# Patient Record
Sex: FEMALE | Race: WHITE | NOT HISPANIC OR LATINO | Employment: OTHER | URBAN - METROPOLITAN AREA
[De-identification: names, ages, dates, MRNs, and addresses within clinical notes are randomized per-mention and may not be internally consistent; named-entity substitution may affect disease eponyms.]

---

## 2017-02-04 ENCOUNTER — APPOINTMENT (INPATIENT)
Dept: CT IMAGING | Facility: HOSPITAL | Age: 82
DRG: 177 | End: 2017-02-04
Payer: MEDICARE

## 2017-02-04 ENCOUNTER — APPOINTMENT (EMERGENCY)
Dept: RADIOLOGY | Facility: HOSPITAL | Age: 82
DRG: 177 | End: 2017-02-04
Payer: MEDICARE

## 2017-02-04 ENCOUNTER — HOSPITAL ENCOUNTER (INPATIENT)
Facility: HOSPITAL | Age: 82
LOS: 9 days | Discharge: NURSING, REHAB OR LONG-TERM CARE | DRG: 177 | End: 2017-02-13
Attending: EMERGENCY MEDICINE | Admitting: INTERNAL MEDICINE
Payer: MEDICARE

## 2017-02-04 DIAGNOSIS — N17.9 AKI (ACUTE KIDNEY INJURY) (HCC): ICD-10-CM

## 2017-02-04 DIAGNOSIS — J44.1 COPD EXACERBATION (HCC): ICD-10-CM

## 2017-02-04 DIAGNOSIS — I50.9 ACUTE EXACERBATION OF CHF (CONGESTIVE HEART FAILURE) (HCC): Primary | ICD-10-CM

## 2017-02-04 PROBLEM — I44.7 LBBB (LEFT BUNDLE BRANCH BLOCK): Status: ACTIVE | Noted: 2017-02-04

## 2017-02-04 PROBLEM — J96.21 ACUTE ON CHRONIC RESPIRATORY FAILURE WITH HYPOXIA (HCC): Status: ACTIVE | Noted: 2017-02-04

## 2017-02-04 PROBLEM — E03.9 HYPOTHYROID: Status: ACTIVE | Noted: 2017-02-04

## 2017-02-04 LAB
ALBUMIN SERPL BCP-MCNC: 4 G/DL (ref 3.5–5)
ALP SERPL-CCNC: 52 U/L (ref 46–116)
ALT SERPL W P-5'-P-CCNC: 19 U/L (ref 12–78)
ANION GAP SERPL CALCULATED.3IONS-SCNC: 9 MMOL/L (ref 4–13)
APTT PPP: 31 SECONDS (ref 24–36)
AST SERPL W P-5'-P-CCNC: 17 U/L (ref 5–45)
BASE EXCESS BLDA CALC-SCNC: -2 MMOL/L (ref -2–3)
BASOPHILS # BLD AUTO: 0 THOUSANDS/ΜL (ref 0–0.1)
BASOPHILS NFR BLD AUTO: 0 % (ref 0–1)
BILIRUB SERPL-MCNC: 0.5 MG/DL (ref 0.2–1)
BILIRUB UR QL STRIP: NEGATIVE
BUN SERPL-MCNC: 59 MG/DL (ref 5–25)
CA-I BLD-SCNC: 1.15 MMOL/L (ref 1.12–1.32)
CALCIUM SERPL-MCNC: 9.2 MG/DL (ref 8.3–10.1)
CHLORIDE SERPL-SCNC: 101 MMOL/L (ref 100–108)
CLARITY UR: NORMAL
CO2 SERPL-SCNC: 29 MMOL/L (ref 21–32)
COLOR UR: YELLOW
CREAT SERPL-MCNC: 2.28 MG/DL (ref 0.6–1.3)
EOSINOPHIL # BLD AUTO: 0 THOUSAND/ΜL (ref 0–0.61)
EOSINOPHIL NFR BLD AUTO: 0 % (ref 0–6)
ERYTHROCYTE [DISTWIDTH] IN BLOOD BY AUTOMATED COUNT: 13.5 % (ref 11.6–15.1)
FLUAV AG SPEC QL IA: NEGATIVE
FLUAV AG SPEC QL: NORMAL
FLUBV AG SPEC QL IA: NEGATIVE
FLUBV AG SPEC QL: NORMAL
GFR SERPL CREATININE-BSD FRML MDRD: 20.4 ML/MIN/1.73SQ M
GLUCOSE SERPL-MCNC: 158 MG/DL (ref 65–140)
GLUCOSE SERPL-MCNC: 171 MG/DL (ref 65–140)
GLUCOSE SERPL-MCNC: 172 MG/DL (ref 65–140)
GLUCOSE SERPL-MCNC: 172 MG/DL (ref 65–140)
GLUCOSE UR STRIP-MCNC: NEGATIVE MG/DL
HCO3 BLDA-SCNC: 24.6 MMOL/L (ref 24–30)
HCT VFR BLD AUTO: 39.6 % (ref 34.8–46.1)
HCT VFR BLD CALC: 37 % (ref 34.8–46.1)
HGB BLD-MCNC: 12.3 G/DL (ref 11.5–15.4)
HGB BLDA-MCNC: 12.6 G/DL (ref 11.5–15.4)
HGB UR QL STRIP.AUTO: NEGATIVE
INR PPP: 1.12 (ref 0.86–1.16)
KETONES UR STRIP-MCNC: NEGATIVE MG/DL
LACTATE SERPL-SCNC: 3 MMOL/L (ref 0.5–2)
LEUKOCYTE ESTERASE UR QL STRIP: NEGATIVE
LYMPHOCYTES # BLD AUTO: 0.64 THOUSANDS/ΜL (ref 0.6–4.47)
LYMPHOCYTES NFR BLD AUTO: 9 % (ref 14–44)
MAGNESIUM SERPL-MCNC: 2 MG/DL (ref 1.6–2.6)
MCH RBC QN AUTO: 28.3 PG (ref 26.8–34.3)
MCHC RBC AUTO-ENTMCNC: 31.1 G/DL (ref 31.4–37.4)
MCV RBC AUTO: 91 FL (ref 82–98)
MONOCYTES # BLD AUTO: 0.19 THOUSAND/ΜL (ref 0.17–1.22)
MONOCYTES NFR BLD AUTO: 3 % (ref 4–12)
NEUTROPHILS # BLD AUTO: 6.06 THOUSANDS/ΜL (ref 1.85–7.62)
NEUTS SEG NFR BLD AUTO: 88 % (ref 43–75)
NITRITE UR QL STRIP: NEGATIVE
NT-PROBNP SERPL-MCNC: 316 PG/ML
PCO2 BLD: 26 MMOL/L (ref 21–32)
PCO2 BLD: 47.6 MM HG (ref 42–50)
PH BLD: 7.32 [PH] (ref 7.3–7.4)
PH UR STRIP.AUTO: 6 [PH] (ref 4.5–8)
PLATELET # BLD AUTO: 186 THOUSANDS/UL (ref 149–390)
PLATELET # BLD AUTO: 212 THOUSANDS/UL (ref 149–390)
PMV BLD AUTO: 10.3 FL (ref 8.9–12.7)
PMV BLD AUTO: 9.8 FL (ref 8.9–12.7)
PO2 BLD: 91 MM HG (ref 35–45)
POTASSIUM BLD-SCNC: 4.2 MMOL/L (ref 3.5–5.3)
POTASSIUM SERPL-SCNC: 4.4 MMOL/L (ref 3.5–5.3)
PROT SERPL-MCNC: 7.7 G/DL (ref 6.4–8.2)
PROT UR STRIP-MCNC: NEGATIVE MG/DL
PROTHROMBIN TIME: 14.2 SECONDS (ref 12–14.3)
RBC # BLD AUTO: 4.35 MILLION/UL (ref 3.81–5.12)
RSV B RNA SPEC QL NAA+PROBE: NORMAL
SAO2 % BLD FROM PO2: 96 % (ref 95–98)
SODIUM BLD-SCNC: 139 MMOL/L (ref 136–145)
SODIUM SERPL-SCNC: 139 MMOL/L (ref 136–145)
SP GR UR STRIP.AUTO: 1.02 (ref 1–1.03)
SPECIMEN SOURCE: ABNORMAL
TROPONIN I SERPL-MCNC: 0.07 NG/ML
TROPONIN I SERPL-MCNC: 0.08 NG/ML
TROPONIN I SERPL-MCNC: 0.12 NG/ML
TSH SERPL DL<=0.05 MIU/L-ACNC: 0.6 UIU/ML (ref 0.36–3.74)
UROBILINOGEN UR QL STRIP.AUTO: 0.2 E.U./DL
WBC # BLD AUTO: 6.89 THOUSAND/UL (ref 4.31–10.16)

## 2017-02-04 PROCEDURE — 84295 ASSAY OF SERUM SODIUM: CPT

## 2017-02-04 PROCEDURE — 94664 DEMO&/EVAL PT USE INHALER: CPT

## 2017-02-04 PROCEDURE — 36600 WITHDRAWAL OF ARTERIAL BLOOD: CPT

## 2017-02-04 PROCEDURE — C9113 INJ PANTOPRAZOLE SODIUM, VIA: HCPCS | Performed by: PHYSICIAN ASSISTANT

## 2017-02-04 PROCEDURE — 94640 AIRWAY INHALATION TREATMENT: CPT

## 2017-02-04 PROCEDURE — 84484 ASSAY OF TROPONIN QUANT: CPT | Performed by: PHYSICIAN ASSISTANT

## 2017-02-04 PROCEDURE — 85610 PROTHROMBIN TIME: CPT | Performed by: EMERGENCY MEDICINE

## 2017-02-04 PROCEDURE — 81003 URINALYSIS AUTO W/O SCOPE: CPT | Performed by: EMERGENCY MEDICINE

## 2017-02-04 PROCEDURE — 84484 ASSAY OF TROPONIN QUANT: CPT | Performed by: EMERGENCY MEDICINE

## 2017-02-04 PROCEDURE — 84443 ASSAY THYROID STIM HORMONE: CPT | Performed by: PHYSICIAN ASSISTANT

## 2017-02-04 PROCEDURE — 83605 ASSAY OF LACTIC ACID: CPT | Performed by: PHYSICIAN ASSISTANT

## 2017-02-04 PROCEDURE — 85730 THROMBOPLASTIN TIME PARTIAL: CPT | Performed by: EMERGENCY MEDICINE

## 2017-02-04 PROCEDURE — 71250 CT THORAX DX C-: CPT

## 2017-02-04 PROCEDURE — 94760 N-INVAS EAR/PLS OXIMETRY 1: CPT

## 2017-02-04 PROCEDURE — 94660 CPAP INITIATION&MGMT: CPT

## 2017-02-04 PROCEDURE — 85025 COMPLETE CBC W/AUTO DIFF WBC: CPT | Performed by: EMERGENCY MEDICINE

## 2017-02-04 PROCEDURE — 36415 COLL VENOUS BLD VENIPUNCTURE: CPT | Performed by: PHYSICIAN ASSISTANT

## 2017-02-04 PROCEDURE — 83735 ASSAY OF MAGNESIUM: CPT | Performed by: EMERGENCY MEDICINE

## 2017-02-04 PROCEDURE — 82803 BLOOD GASES ANY COMBINATION: CPT

## 2017-02-04 PROCEDURE — 80053 COMPREHEN METABOLIC PANEL: CPT | Performed by: EMERGENCY MEDICINE

## 2017-02-04 PROCEDURE — 82948 REAGENT STRIP/BLOOD GLUCOSE: CPT

## 2017-02-04 PROCEDURE — 99285 EMERGENCY DEPT VISIT HI MDM: CPT

## 2017-02-04 PROCEDURE — 85014 HEMATOCRIT: CPT

## 2017-02-04 PROCEDURE — 84132 ASSAY OF SERUM POTASSIUM: CPT

## 2017-02-04 PROCEDURE — 87040 BLOOD CULTURE FOR BACTERIA: CPT | Performed by: PHYSICIAN ASSISTANT

## 2017-02-04 PROCEDURE — 87449 NOS EACH ORGANISM AG IA: CPT | Performed by: INTERNAL MEDICINE

## 2017-02-04 PROCEDURE — 71010 HB CHEST X-RAY 1 VIEW FRONTAL (PORTABLE): CPT

## 2017-02-04 PROCEDURE — 96375 TX/PRO/DX INJ NEW DRUG ADDON: CPT

## 2017-02-04 PROCEDURE — 87400 INFLUENZA A/B EACH AG IA: CPT | Performed by: EMERGENCY MEDICINE

## 2017-02-04 PROCEDURE — 82947 ASSAY GLUCOSE BLOOD QUANT: CPT

## 2017-02-04 PROCEDURE — 82330 ASSAY OF CALCIUM: CPT

## 2017-02-04 PROCEDURE — 96374 THER/PROPH/DIAG INJ IV PUSH: CPT

## 2017-02-04 PROCEDURE — 83880 ASSAY OF NATRIURETIC PEPTIDE: CPT | Performed by: EMERGENCY MEDICINE

## 2017-02-04 PROCEDURE — 87798 DETECT AGENT NOS DNA AMP: CPT | Performed by: EMERGENCY MEDICINE

## 2017-02-04 PROCEDURE — 93005 ELECTROCARDIOGRAM TRACING: CPT | Performed by: EMERGENCY MEDICINE

## 2017-02-04 PROCEDURE — 85049 AUTOMATED PLATELET COUNT: CPT | Performed by: PHYSICIAN ASSISTANT

## 2017-02-04 RX ORDER — SODIUM CHLORIDE FOR INHALATION 0.9 %
VIAL, NEBULIZER (ML) INHALATION
Status: DISPENSED
Start: 2017-02-04 | End: 2017-02-05

## 2017-02-04 RX ORDER — NYSTATIN 100000 [USP'U]/G
1 POWDER TOPICAL 3 TIMES DAILY
Status: DISCONTINUED | OUTPATIENT
Start: 2017-02-04 | End: 2017-02-13 | Stop reason: HOSPADM

## 2017-02-04 RX ORDER — ONDANSETRON 2 MG/ML
4 INJECTION INTRAMUSCULAR; INTRAVENOUS ONCE
Status: COMPLETED | OUTPATIENT
Start: 2017-02-04 | End: 2017-02-04

## 2017-02-04 RX ORDER — LEVALBUTEROL 1.25 MG/.5ML
SOLUTION, CONCENTRATE RESPIRATORY (INHALATION)
Status: DISPENSED
Start: 2017-02-04 | End: 2017-02-05

## 2017-02-04 RX ORDER — QUETIAPINE FUMARATE 25 MG/1
50 TABLET, FILM COATED ORAL DAILY
Status: DISCONTINUED | OUTPATIENT
Start: 2017-02-04 | End: 2017-02-06

## 2017-02-04 RX ORDER — MINERAL OIL AND PETROLATUM 150; 830 MG/G; MG/G
1 OINTMENT OPHTHALMIC 4 TIMES DAILY
Status: DISCONTINUED | OUTPATIENT
Start: 2017-02-04 | End: 2017-02-07

## 2017-02-04 RX ORDER — ONDANSETRON 2 MG/ML
INJECTION INTRAMUSCULAR; INTRAVENOUS
Status: COMPLETED
Start: 2017-02-04 | End: 2017-02-04

## 2017-02-04 RX ORDER — PANTOPRAZOLE SODIUM 40 MG/1
40 INJECTION, POWDER, FOR SOLUTION INTRAVENOUS
Status: DISCONTINUED | OUTPATIENT
Start: 2017-02-04 | End: 2017-02-06

## 2017-02-04 RX ORDER — LEVOTHYROXINE SODIUM 0.05 MG/1
50 TABLET ORAL
Status: DISCONTINUED | OUTPATIENT
Start: 2017-02-05 | End: 2017-02-13 | Stop reason: HOSPADM

## 2017-02-04 RX ORDER — QUETIAPINE FUMARATE 25 MG/1
25 TABLET, FILM COATED ORAL DAILY
Status: DISCONTINUED | OUTPATIENT
Start: 2017-02-05 | End: 2017-02-13 | Stop reason: HOSPADM

## 2017-02-04 RX ORDER — SODIUM CHLORIDE FOR INHALATION 0.9 %
3 VIAL, NEBULIZER (ML) INHALATION
Status: DISCONTINUED | OUTPATIENT
Start: 2017-02-04 | End: 2017-02-06

## 2017-02-04 RX ORDER — BUMETANIDE 0.25 MG/ML
1 INJECTION, SOLUTION INTRAMUSCULAR; INTRAVENOUS ONCE
Status: COMPLETED | OUTPATIENT
Start: 2017-02-04 | End: 2017-02-04

## 2017-02-04 RX ORDER — HEPARIN SODIUM 5000 [USP'U]/ML
5000 INJECTION, SOLUTION INTRAVENOUS; SUBCUTANEOUS EVERY 8 HOURS SCHEDULED
Status: DISCONTINUED | OUTPATIENT
Start: 2017-02-04 | End: 2017-02-13 | Stop reason: HOSPADM

## 2017-02-04 RX ORDER — CITALOPRAM 20 MG/1
40 TABLET ORAL DAILY
Status: DISCONTINUED | OUTPATIENT
Start: 2017-02-04 | End: 2017-02-13 | Stop reason: HOSPADM

## 2017-02-04 RX ORDER — BUSPIRONE HYDROCHLORIDE 5 MG/1
15 TABLET ORAL 3 TIMES DAILY
Status: DISCONTINUED | OUTPATIENT
Start: 2017-02-04 | End: 2017-02-13 | Stop reason: HOSPADM

## 2017-02-04 RX ORDER — LORAZEPAM 0.5 MG/1
0.5 TABLET ORAL 3 TIMES DAILY
Status: DISCONTINUED | OUTPATIENT
Start: 2017-02-04 | End: 2017-02-13 | Stop reason: HOSPADM

## 2017-02-04 RX ORDER — ALBUTEROL SULFATE 2.5 MG/3ML
SOLUTION RESPIRATORY (INHALATION)
Status: COMPLETED
Start: 2017-02-04 | End: 2017-02-04

## 2017-02-04 RX ORDER — LEVALBUTEROL 1.25 MG/.5ML
1.25 SOLUTION, CONCENTRATE RESPIRATORY (INHALATION)
Status: DISCONTINUED | OUTPATIENT
Start: 2017-02-04 | End: 2017-02-06

## 2017-02-04 RX ORDER — METHYLPREDNISOLONE SODIUM SUCCINATE 40 MG/ML
40 INJECTION, POWDER, LYOPHILIZED, FOR SOLUTION INTRAMUSCULAR; INTRAVENOUS EVERY 8 HOURS SCHEDULED
Status: DISCONTINUED | OUTPATIENT
Start: 2017-02-04 | End: 2017-02-05

## 2017-02-04 RX ORDER — MELATONIN
2000 DAILY
Status: DISCONTINUED | OUTPATIENT
Start: 2017-02-04 | End: 2017-02-13 | Stop reason: HOSPADM

## 2017-02-04 RX ORDER — QUETIAPINE FUMARATE 100 MG/1
100 TABLET, FILM COATED ORAL
Status: DISCONTINUED | OUTPATIENT
Start: 2017-02-04 | End: 2017-02-13 | Stop reason: HOSPADM

## 2017-02-04 RX ORDER — METHYLPREDNISOLONE SODIUM SUCCINATE 125 MG/2ML
INJECTION, POWDER, LYOPHILIZED, FOR SOLUTION INTRAMUSCULAR; INTRAVENOUS
Status: COMPLETED
Start: 2017-02-04 | End: 2017-02-04

## 2017-02-04 RX ADMIN — ONDANSETRON 4 MG: 2 INJECTION INTRAMUSCULAR; INTRAVENOUS at 05:45

## 2017-02-04 RX ADMIN — MINERAL OIL AND WHITE PETROLATUM 1 APPLICATION: 150; 830 OINTMENT OPHTHALMIC at 18:14

## 2017-02-04 RX ADMIN — MINERAL OIL AND WHITE PETROLATUM 1 APPLICATION: 150; 830 OINTMENT OPHTHALMIC at 22:30

## 2017-02-04 RX ADMIN — PANTOPRAZOLE SODIUM 40 MG: 40 INJECTION, POWDER, FOR SOLUTION INTRAVENOUS at 08:21

## 2017-02-04 RX ADMIN — LORAZEPAM 0.5 MG: 0.5 TABLET ORAL at 17:43

## 2017-02-04 RX ADMIN — ISODIUM CHLORIDE 3 ML: 0.03 SOLUTION RESPIRATORY (INHALATION) at 14:30

## 2017-02-04 RX ADMIN — CEFTRIAXONE SODIUM 1000 MG: 10 INJECTION, POWDER, FOR SOLUTION INTRAVENOUS at 12:25

## 2017-02-04 RX ADMIN — METHYLPREDNISOLONE SODIUM SUCCINATE: 125 INJECTION, POWDER, FOR SOLUTION INTRAMUSCULAR; INTRAVENOUS at 06:13

## 2017-02-04 RX ADMIN — LEVALBUTEROL HYDROCHLORIDE 1.25 MG: 1.25 SOLUTION, CONCENTRATE RESPIRATORY (INHALATION) at 14:30

## 2017-02-04 RX ADMIN — BUMETANIDE 1 MG: 0.25 INJECTION INTRAMUSCULAR; INTRAVENOUS at 06:19

## 2017-02-04 RX ADMIN — ALBUTEROL SULFATE: 2.5 SOLUTION RESPIRATORY (INHALATION) at 06:14

## 2017-02-04 RX ADMIN — METRONIDAZOLE 500 MG: 500 INJECTION, SOLUTION INTRAVENOUS at 19:02

## 2017-02-04 RX ADMIN — AZITHROMYCIN MONOHYDRATE 500 MG: 500 INJECTION, POWDER, LYOPHILIZED, FOR SOLUTION INTRAVENOUS at 10:47

## 2017-02-04 RX ADMIN — HEPARIN SODIUM 5000 UNITS: 5000 INJECTION, SOLUTION INTRAVENOUS; SUBCUTANEOUS at 08:21

## 2017-02-04 RX ADMIN — BUSPIRONE HYDROCHLORIDE 15 MG: 5 TABLET ORAL at 17:43

## 2017-02-04 RX ADMIN — LEVALBUTEROL HYDROCHLORIDE 1.25 MG: 1.25 SOLUTION, CONCENTRATE RESPIRATORY (INHALATION) at 19:17

## 2017-02-04 RX ADMIN — METHYLPREDNISOLONE SODIUM SUCCINATE 40 MG: 40 INJECTION, POWDER, FOR SOLUTION INTRAMUSCULAR; INTRAVENOUS at 20:08

## 2017-02-04 RX ADMIN — INSULIN LISPRO 1 UNITS: 100 INJECTION, SOLUTION INTRAVENOUS; SUBCUTANEOUS at 19:04

## 2017-02-04 RX ADMIN — HEPARIN SODIUM 5000 UNITS: 5000 INJECTION, SOLUTION INTRAVENOUS; SUBCUTANEOUS at 21:10

## 2017-02-04 RX ADMIN — NYSTATIN 1 APPLICATION: 100000 POWDER TOPICAL at 17:00

## 2017-02-04 RX ADMIN — INSULIN LISPRO 1 UNITS: 100 INJECTION, SOLUTION INTRAVENOUS; SUBCUTANEOUS at 12:51

## 2017-02-04 RX ADMIN — ISODIUM CHLORIDE 3 ML: 0.03 SOLUTION RESPIRATORY (INHALATION) at 19:17

## 2017-02-04 RX ADMIN — NYSTATIN 1 APPLICATION: 100000 POWDER TOPICAL at 21:00

## 2017-02-04 RX ADMIN — METHYLPREDNISOLONE SODIUM SUCCINATE 40 MG: 40 INJECTION, POWDER, FOR SOLUTION INTRAMUSCULAR; INTRAVENOUS at 10:49

## 2017-02-04 RX ADMIN — METHYLPREDNISOLONE SODIUM SUCCINATE: 40 INJECTION, POWDER, FOR SOLUTION INTRAMUSCULAR; INTRAVENOUS at 06:13

## 2017-02-05 ENCOUNTER — APPOINTMENT (INPATIENT)
Dept: NON INVASIVE DIAGNOSTICS | Facility: HOSPITAL | Age: 82
DRG: 177 | End: 2017-02-05
Payer: MEDICARE

## 2017-02-05 PROBLEM — I21.4 NSTEMI (NON-ST ELEVATED MYOCARDIAL INFARCTION) (HCC): Status: ACTIVE | Noted: 2017-02-05

## 2017-02-05 PROBLEM — F32.A DEPRESSION: Status: ACTIVE | Noted: 2017-02-05

## 2017-02-05 PROBLEM — R73.9 HYPERGLYCEMIA: Status: ACTIVE | Noted: 2017-02-05

## 2017-02-05 PROBLEM — J18.9 CAP (COMMUNITY ACQUIRED PNEUMONIA): Status: ACTIVE | Noted: 2017-02-05

## 2017-02-05 PROBLEM — D72.829 LEUKOCYTOSIS: Status: ACTIVE | Noted: 2017-02-05

## 2017-02-05 PROBLEM — D64.9 ANEMIA: Status: ACTIVE | Noted: 2017-02-05

## 2017-02-05 LAB
ANION GAP SERPL CALCULATED.3IONS-SCNC: 9 MMOL/L (ref 4–13)
BASOPHILS # BLD MANUAL: 0 THOUSAND/UL (ref 0–0.1)
BASOPHILS NFR MAR MANUAL: 0 % (ref 0–1)
BUN SERPL-MCNC: 64 MG/DL (ref 5–25)
CALCIUM SERPL-MCNC: 8.5 MG/DL (ref 8.3–10.1)
CHLORIDE SERPL-SCNC: 102 MMOL/L (ref 100–108)
CO2 SERPL-SCNC: 28 MMOL/L (ref 21–32)
CREAT SERPL-MCNC: 2.26 MG/DL (ref 0.6–1.3)
EOSINOPHIL # BLD MANUAL: 0 THOUSAND/UL (ref 0–0.4)
EOSINOPHIL NFR BLD MANUAL: 0 % (ref 0–6)
ERYTHROCYTE [DISTWIDTH] IN BLOOD BY AUTOMATED COUNT: 13.7 % (ref 11.6–15.1)
GFR SERPL CREATININE-BSD FRML MDRD: 20.6 ML/MIN/1.73SQ M
GLUCOSE SERPL-MCNC: 135 MG/DL (ref 65–140)
GLUCOSE SERPL-MCNC: 149 MG/DL (ref 65–140)
GLUCOSE SERPL-MCNC: 151 MG/DL (ref 65–140)
GLUCOSE SERPL-MCNC: 168 MG/DL (ref 65–140)
GLUCOSE SERPL-MCNC: 188 MG/DL (ref 65–140)
HCT VFR BLD AUTO: 31.7 % (ref 34.8–46.1)
HGB BLD-MCNC: 9.8 G/DL (ref 11.5–15.4)
L PNEUMO1 AG UR QL IA.RAPID: NEGATIVE
LACTATE SERPL-SCNC: 1.2 MMOL/L (ref 0.5–2)
LYMPHOCYTES # BLD AUTO: 0.45 THOUSAND/UL (ref 0.6–4.47)
LYMPHOCYTES # BLD AUTO: 3 % (ref 14–44)
MAGNESIUM SERPL-MCNC: 2.1 MG/DL (ref 1.6–2.6)
MCH RBC QN AUTO: 28.2 PG (ref 26.8–34.3)
MCHC RBC AUTO-ENTMCNC: 30.9 G/DL (ref 31.4–37.4)
MCV RBC AUTO: 91 FL (ref 82–98)
MONOCYTES # BLD AUTO: 0.76 THOUSAND/UL (ref 0–1.22)
MONOCYTES NFR BLD: 5 % (ref 4–12)
NEUTROPHILS # BLD MANUAL: 13.93 THOUSAND/UL (ref 1.85–7.62)
NEUTS SEG NFR BLD AUTO: 92 % (ref 43–75)
PLATELET # BLD AUTO: 175 THOUSANDS/UL (ref 149–390)
PLATELET BLD QL SMEAR: ADEQUATE
PMV BLD AUTO: 10.9 FL (ref 8.9–12.7)
POTASSIUM SERPL-SCNC: 3.7 MMOL/L (ref 3.5–5.3)
RBC # BLD AUTO: 3.48 MILLION/UL (ref 3.81–5.12)
S PNEUM AG UR QL: NEGATIVE
SODIUM SERPL-SCNC: 139 MMOL/L (ref 136–145)
TOTAL CELLS COUNTED SPEC: 100
WBC # BLD AUTO: 15.14 THOUSAND/UL (ref 4.31–10.16)

## 2017-02-05 PROCEDURE — 94640 AIRWAY INHALATION TREATMENT: CPT

## 2017-02-05 PROCEDURE — 85027 COMPLETE CBC AUTOMATED: CPT | Performed by: PHYSICIAN ASSISTANT

## 2017-02-05 PROCEDURE — 85007 BL SMEAR W/DIFF WBC COUNT: CPT | Performed by: PHYSICIAN ASSISTANT

## 2017-02-05 PROCEDURE — 94660 CPAP INITIATION&MGMT: CPT

## 2017-02-05 PROCEDURE — 82948 REAGENT STRIP/BLOOD GLUCOSE: CPT

## 2017-02-05 PROCEDURE — 80048 BASIC METABOLIC PNL TOTAL CA: CPT | Performed by: PHYSICIAN ASSISTANT

## 2017-02-05 PROCEDURE — 94760 N-INVAS EAR/PLS OXIMETRY 1: CPT

## 2017-02-05 PROCEDURE — C9113 INJ PANTOPRAZOLE SODIUM, VIA: HCPCS | Performed by: PHYSICIAN ASSISTANT

## 2017-02-05 PROCEDURE — 83605 ASSAY OF LACTIC ACID: CPT | Performed by: PHYSICIAN ASSISTANT

## 2017-02-05 PROCEDURE — 83735 ASSAY OF MAGNESIUM: CPT | Performed by: PHYSICIAN ASSISTANT

## 2017-02-05 PROCEDURE — 93306 TTE W/DOPPLER COMPLETE: CPT

## 2017-02-05 RX ORDER — METHYLPREDNISOLONE SODIUM SUCCINATE 40 MG/ML
40 INJECTION, POWDER, LYOPHILIZED, FOR SOLUTION INTRAMUSCULAR; INTRAVENOUS EVERY 12 HOURS SCHEDULED
Status: COMPLETED | OUTPATIENT
Start: 2017-02-05 | End: 2017-02-07

## 2017-02-05 RX ORDER — SODIUM CHLORIDE 9 MG/ML
75 INJECTION, SOLUTION INTRAVENOUS CONTINUOUS
Status: DISCONTINUED | OUTPATIENT
Start: 2017-02-05 | End: 2017-02-06

## 2017-02-05 RX ORDER — ACETAMINOPHEN 325 MG/1
650 TABLET ORAL EVERY 6 HOURS PRN
Status: DISCONTINUED | OUTPATIENT
Start: 2017-02-05 | End: 2017-02-13 | Stop reason: HOSPADM

## 2017-02-05 RX ADMIN — BUSPIRONE HYDROCHLORIDE 15 MG: 5 TABLET ORAL at 21:58

## 2017-02-05 RX ADMIN — INSULIN LISPRO 1 UNITS: 100 INJECTION, SOLUTION INTRAVENOUS; SUBCUTANEOUS at 19:21

## 2017-02-05 RX ADMIN — LORAZEPAM 0.5 MG: 0.5 TABLET ORAL at 09:30

## 2017-02-05 RX ADMIN — QUETIAPINE FUMARATE 50 MG: 25 TABLET, FILM COATED ORAL at 22:00

## 2017-02-05 RX ADMIN — QUETIAPINE FUMARATE 100 MG: 100 TABLET ORAL at 22:00

## 2017-02-05 RX ADMIN — QUETIAPINE FUMARATE 25 MG: 25 TABLET, FILM COATED ORAL at 09:31

## 2017-02-05 RX ADMIN — NYSTATIN 1 APPLICATION: 100000 POWDER TOPICAL at 17:22

## 2017-02-05 RX ADMIN — METRONIDAZOLE 500 MG: 500 INJECTION, SOLUTION INTRAVENOUS at 19:21

## 2017-02-05 RX ADMIN — LEVALBUTEROL HYDROCHLORIDE 1.25 MG: 1.25 SOLUTION, CONCENTRATE RESPIRATORY (INHALATION) at 08:13

## 2017-02-05 RX ADMIN — BUSPIRONE HYDROCHLORIDE 15 MG: 5 TABLET ORAL at 17:18

## 2017-02-05 RX ADMIN — ISODIUM CHLORIDE 3 ML: 0.03 SOLUTION RESPIRATORY (INHALATION) at 19:17

## 2017-02-05 RX ADMIN — METRONIDAZOLE 500 MG: 500 INJECTION, SOLUTION INTRAVENOUS at 11:47

## 2017-02-05 RX ADMIN — NYSTATIN 1 APPLICATION: 100000 POWDER TOPICAL at 09:32

## 2017-02-05 RX ADMIN — NYSTATIN 1 APPLICATION: 100000 POWDER TOPICAL at 22:00

## 2017-02-05 RX ADMIN — MINERAL OIL AND WHITE PETROLATUM 1 APPLICATION: 150; 830 OINTMENT OPHTHALMIC at 17:21

## 2017-02-05 RX ADMIN — AZITHROMYCIN MONOHYDRATE 500 MG: 500 INJECTION, POWDER, LYOPHILIZED, FOR SOLUTION INTRAVENOUS at 09:36

## 2017-02-05 RX ADMIN — LEVOTHYROXINE SODIUM 50 MCG: 50 TABLET ORAL at 05:57

## 2017-02-05 RX ADMIN — METHYLPREDNISOLONE SODIUM SUCCINATE 40 MG: 40 INJECTION, POWDER, FOR SOLUTION INTRAMUSCULAR; INTRAVENOUS at 21:58

## 2017-02-05 RX ADMIN — HEPARIN SODIUM 5000 UNITS: 5000 INJECTION, SOLUTION INTRAVENOUS; SUBCUTANEOUS at 21:58

## 2017-02-05 RX ADMIN — INSULIN LISPRO 1 UNITS: 100 INJECTION, SOLUTION INTRAVENOUS; SUBCUTANEOUS at 00:26

## 2017-02-05 RX ADMIN — LORAZEPAM 0.5 MG: 0.5 TABLET ORAL at 17:18

## 2017-02-05 RX ADMIN — SODIUM CHLORIDE 75 ML/HR: 0.9 INJECTION, SOLUTION INTRAVENOUS at 11:46

## 2017-02-05 RX ADMIN — LORAZEPAM 0.5 MG: 0.5 TABLET ORAL at 21:58

## 2017-02-05 RX ADMIN — MINERAL OIL AND WHITE PETROLATUM 1 APPLICATION: 150; 830 OINTMENT OPHTHALMIC at 21:58

## 2017-02-05 RX ADMIN — BUSPIRONE HYDROCHLORIDE 15 MG: 5 TABLET ORAL at 09:32

## 2017-02-05 RX ADMIN — ISODIUM CHLORIDE 3 ML: 0.03 SOLUTION RESPIRATORY (INHALATION) at 13:20

## 2017-02-05 RX ADMIN — VITAMIN D, TAB 1000IU (100/BT) 2000 UNITS: 25 TAB at 09:31

## 2017-02-05 RX ADMIN — METRONIDAZOLE 500 MG: 500 INJECTION, SOLUTION INTRAVENOUS at 03:38

## 2017-02-05 RX ADMIN — HEPARIN SODIUM 5000 UNITS: 5000 INJECTION, SOLUTION INTRAVENOUS; SUBCUTANEOUS at 05:06

## 2017-02-05 RX ADMIN — MINERAL OIL AND WHITE PETROLATUM 1 APPLICATION: 150; 830 OINTMENT OPHTHALMIC at 12:32

## 2017-02-05 RX ADMIN — MINERAL OIL AND WHITE PETROLATUM 1 APPLICATION: 150; 830 OINTMENT OPHTHALMIC at 09:33

## 2017-02-05 RX ADMIN — LEVALBUTEROL HYDROCHLORIDE 1.25 MG: 1.25 SOLUTION, CONCENTRATE RESPIRATORY (INHALATION) at 13:20

## 2017-02-05 RX ADMIN — ISODIUM CHLORIDE 3 ML: 0.03 SOLUTION RESPIRATORY (INHALATION) at 08:12

## 2017-02-05 RX ADMIN — CITALOPRAM HYDROBROMIDE 40 MG: 20 TABLET ORAL at 09:30

## 2017-02-05 RX ADMIN — LEVALBUTEROL HYDROCHLORIDE 1.25 MG: 1.25 SOLUTION, CONCENTRATE RESPIRATORY (INHALATION) at 19:17

## 2017-02-05 RX ADMIN — CEFTRIAXONE SODIUM 1000 MG: 10 INJECTION, POWDER, FOR SOLUTION INTRAVENOUS at 09:35

## 2017-02-05 RX ADMIN — HEPARIN SODIUM 5000 UNITS: 5000 INJECTION, SOLUTION INTRAVENOUS; SUBCUTANEOUS at 14:03

## 2017-02-05 RX ADMIN — ACETAMINOPHEN 650 MG: 325 TABLET ORAL at 23:54

## 2017-02-05 RX ADMIN — PANTOPRAZOLE SODIUM 40 MG: 40 INJECTION, POWDER, FOR SOLUTION INTRAVENOUS at 09:30

## 2017-02-06 LAB
ANION GAP SERPL CALCULATED.3IONS-SCNC: 9 MMOL/L (ref 4–13)
BASOPHILS # BLD MANUAL: 0 THOUSAND/UL (ref 0–0.1)
BASOPHILS NFR MAR MANUAL: 0 % (ref 0–1)
BUN SERPL-MCNC: 63 MG/DL (ref 5–25)
CALCIUM SERPL-MCNC: 8.5 MG/DL (ref 8.3–10.1)
CHLORIDE SERPL-SCNC: 105 MMOL/L (ref 100–108)
CO2 SERPL-SCNC: 29 MMOL/L (ref 21–32)
CREAT SERPL-MCNC: 1.89 MG/DL (ref 0.6–1.3)
EOSINOPHIL # BLD MANUAL: 0 THOUSAND/UL (ref 0–0.4)
EOSINOPHIL NFR BLD MANUAL: 0 % (ref 0–6)
ERYTHROCYTE [DISTWIDTH] IN BLOOD BY AUTOMATED COUNT: 13.8 % (ref 11.6–15.1)
GFR SERPL CREATININE-BSD FRML MDRD: 25.3 ML/MIN/1.73SQ M
GLUCOSE SERPL-MCNC: 108 MG/DL (ref 65–140)
GLUCOSE SERPL-MCNC: 108 MG/DL (ref 65–140)
GLUCOSE SERPL-MCNC: 133 MG/DL (ref 65–140)
GLUCOSE SERPL-MCNC: 143 MG/DL (ref 65–140)
GLUCOSE SERPL-MCNC: 143 MG/DL (ref 65–140)
GLUCOSE SERPL-MCNC: 158 MG/DL (ref 65–140)
HCT VFR BLD AUTO: 31.4 % (ref 34.8–46.1)
HEMOCCULT STL QL: NEGATIVE
HGB BLD-MCNC: 9.6 G/DL (ref 11.5–15.4)
LYMPHOCYTES # BLD AUTO: 0.48 THOUSAND/UL (ref 0.6–4.47)
LYMPHOCYTES # BLD AUTO: 4 % (ref 14–44)
MCH RBC QN AUTO: 27.8 PG (ref 26.8–34.3)
MCHC RBC AUTO-ENTMCNC: 30.6 G/DL (ref 31.4–37.4)
MCV RBC AUTO: 91 FL (ref 82–98)
MONOCYTES # BLD AUTO: 0.6 THOUSAND/UL (ref 0–1.22)
MONOCYTES NFR BLD: 5 % (ref 4–12)
NEUTROPHILS # BLD MANUAL: 10.94 THOUSAND/UL (ref 1.85–7.62)
NEUTS SEG NFR BLD AUTO: 91 % (ref 43–75)
PLATELET # BLD AUTO: 190 THOUSANDS/UL (ref 149–390)
PLATELET BLD QL SMEAR: ADEQUATE
PMV BLD AUTO: 10.7 FL (ref 8.9–12.7)
POTASSIUM SERPL-SCNC: 3.8 MMOL/L (ref 3.5–5.3)
RBC # BLD AUTO: 3.45 MILLION/UL (ref 3.81–5.12)
SODIUM SERPL-SCNC: 143 MMOL/L (ref 136–145)
TOTAL CELLS COUNTED SPEC: 100
WBC # BLD AUTO: 12.02 THOUSAND/UL (ref 4.31–10.16)

## 2017-02-06 PROCEDURE — 87070 CULTURE OTHR SPECIMN AEROBIC: CPT | Performed by: PHYSICIAN ASSISTANT

## 2017-02-06 PROCEDURE — 80048 BASIC METABOLIC PNL TOTAL CA: CPT | Performed by: PHYSICIAN ASSISTANT

## 2017-02-06 PROCEDURE — 94760 N-INVAS EAR/PLS OXIMETRY 1: CPT

## 2017-02-06 PROCEDURE — 85007 BL SMEAR W/DIFF WBC COUNT: CPT | Performed by: PHYSICIAN ASSISTANT

## 2017-02-06 PROCEDURE — C9113 INJ PANTOPRAZOLE SODIUM, VIA: HCPCS | Performed by: PHYSICIAN ASSISTANT

## 2017-02-06 PROCEDURE — 82272 OCCULT BLD FECES 1-3 TESTS: CPT | Performed by: PHYSICIAN ASSISTANT

## 2017-02-06 PROCEDURE — 94640 AIRWAY INHALATION TREATMENT: CPT

## 2017-02-06 PROCEDURE — 87205 SMEAR GRAM STAIN: CPT | Performed by: PHYSICIAN ASSISTANT

## 2017-02-06 PROCEDURE — 85027 COMPLETE CBC AUTOMATED: CPT | Performed by: PHYSICIAN ASSISTANT

## 2017-02-06 PROCEDURE — 82948 REAGENT STRIP/BLOOD GLUCOSE: CPT

## 2017-02-06 RX ORDER — QUETIAPINE FUMARATE 25 MG/1
50 TABLET, FILM COATED ORAL DAILY
Status: DISCONTINUED | OUTPATIENT
Start: 2017-02-06 | End: 2017-02-13 | Stop reason: HOSPADM

## 2017-02-06 RX ORDER — SODIUM CHLORIDE FOR INHALATION 0.9 %
3 VIAL, NEBULIZER (ML) INHALATION
Status: DISCONTINUED | OUTPATIENT
Start: 2017-02-06 | End: 2017-02-06

## 2017-02-06 RX ORDER — QUETIAPINE FUMARATE 25 MG/1
50 TABLET, FILM COATED ORAL DAILY
Status: DISCONTINUED | OUTPATIENT
Start: 2017-02-07 | End: 2017-02-06

## 2017-02-06 RX ORDER — PANTOPRAZOLE SODIUM 40 MG/1
40 TABLET, DELAYED RELEASE ORAL
Status: DISCONTINUED | OUTPATIENT
Start: 2017-02-06 | End: 2017-02-13 | Stop reason: HOSPADM

## 2017-02-06 RX ORDER — LEVALBUTEROL 1.25 MG/.5ML
1.25 SOLUTION, CONCENTRATE RESPIRATORY (INHALATION) EVERY 8 HOURS PRN
Status: DISCONTINUED | OUTPATIENT
Start: 2017-02-06 | End: 2017-02-07

## 2017-02-06 RX ORDER — AZITHROMYCIN 250 MG/1
500 TABLET, FILM COATED ORAL EVERY 24 HOURS
Status: DISCONTINUED | OUTPATIENT
Start: 2017-02-06 | End: 2017-02-10

## 2017-02-06 RX ORDER — SODIUM CHLORIDE FOR INHALATION 0.9 %
3 VIAL, NEBULIZER (ML) INHALATION
Status: DISCONTINUED | OUTPATIENT
Start: 2017-02-06 | End: 2017-02-09

## 2017-02-06 RX ORDER — LEVALBUTEROL 1.25 MG/.5ML
1.25 SOLUTION, CONCENTRATE RESPIRATORY (INHALATION)
Status: DISCONTINUED | OUTPATIENT
Start: 2017-02-06 | End: 2017-02-06

## 2017-02-06 RX ORDER — LEVALBUTEROL 1.25 MG/.5ML
1.25 SOLUTION, CONCENTRATE RESPIRATORY (INHALATION)
Status: DISCONTINUED | OUTPATIENT
Start: 2017-02-06 | End: 2017-02-09

## 2017-02-06 RX ORDER — GUAIFENESIN 600 MG
600 TABLET, EXTENDED RELEASE 12 HR ORAL 2 TIMES DAILY
Status: DISCONTINUED | OUTPATIENT
Start: 2017-02-06 | End: 2017-02-13 | Stop reason: HOSPADM

## 2017-02-06 RX ADMIN — METHYLPREDNISOLONE SODIUM SUCCINATE 40 MG: 40 INJECTION, POWDER, FOR SOLUTION INTRAMUSCULAR; INTRAVENOUS at 09:17

## 2017-02-06 RX ADMIN — LEVALBUTEROL HYDROCHLORIDE 1.25 MG: 1.25 SOLUTION, CONCENTRATE RESPIRATORY (INHALATION) at 13:55

## 2017-02-06 RX ADMIN — BUSPIRONE HYDROCHLORIDE 15 MG: 5 TABLET ORAL at 15:47

## 2017-02-06 RX ADMIN — MINERAL OIL AND WHITE PETROLATUM 1 APPLICATION: 150; 830 OINTMENT OPHTHALMIC at 09:08

## 2017-02-06 RX ADMIN — LEVALBUTEROL HYDROCHLORIDE 1.25 MG: 1.25 SOLUTION, CONCENTRATE RESPIRATORY (INHALATION) at 07:46

## 2017-02-06 RX ADMIN — HEPARIN SODIUM 5000 UNITS: 5000 INJECTION, SOLUTION INTRAVENOUS; SUBCUTANEOUS at 21:37

## 2017-02-06 RX ADMIN — ISODIUM CHLORIDE 3 ML: 0.03 SOLUTION RESPIRATORY (INHALATION) at 19:53

## 2017-02-06 RX ADMIN — LORAZEPAM 0.5 MG: 0.5 TABLET ORAL at 09:03

## 2017-02-06 RX ADMIN — PANTOPRAZOLE SODIUM 40 MG: 40 INJECTION, POWDER, FOR SOLUTION INTRAVENOUS at 09:03

## 2017-02-06 RX ADMIN — NYSTATIN 1 APPLICATION: 100000 POWDER TOPICAL at 09:20

## 2017-02-06 RX ADMIN — ISODIUM CHLORIDE 3 ML: 0.03 SOLUTION RESPIRATORY (INHALATION) at 07:46

## 2017-02-06 RX ADMIN — LEVALBUTEROL HYDROCHLORIDE 1.25 MG: 1.25 SOLUTION, CONCENTRATE RESPIRATORY (INHALATION) at 19:53

## 2017-02-06 RX ADMIN — NYSTATIN 1 APPLICATION: 100000 POWDER TOPICAL at 15:48

## 2017-02-06 RX ADMIN — METHYLPREDNISOLONE SODIUM SUCCINATE 40 MG: 40 INJECTION, POWDER, FOR SOLUTION INTRAMUSCULAR; INTRAVENOUS at 21:37

## 2017-02-06 RX ADMIN — CEFTRIAXONE SODIUM 1000 MG: 10 INJECTION, POWDER, FOR SOLUTION INTRAVENOUS at 09:17

## 2017-02-06 RX ADMIN — LORAZEPAM 0.5 MG: 0.5 TABLET ORAL at 21:37

## 2017-02-06 RX ADMIN — ISODIUM CHLORIDE 3 ML: 0.03 SOLUTION RESPIRATORY (INHALATION) at 13:55

## 2017-02-06 RX ADMIN — HEPARIN SODIUM 5000 UNITS: 5000 INJECTION, SOLUTION INTRAVENOUS; SUBCUTANEOUS at 05:28

## 2017-02-06 RX ADMIN — QUETIAPINE FUMARATE 25 MG: 25 TABLET, FILM COATED ORAL at 09:03

## 2017-02-06 RX ADMIN — BUSPIRONE HYDROCHLORIDE 15 MG: 5 TABLET ORAL at 09:03

## 2017-02-06 RX ADMIN — QUETIAPINE FUMARATE 50 MG: 25 TABLET, FILM COATED ORAL at 21:41

## 2017-02-06 RX ADMIN — Medication 5 DROP: at 12:16

## 2017-02-06 RX ADMIN — SODIUM CHLORIDE 75 ML/HR: 0.9 INJECTION, SOLUTION INTRAVENOUS at 01:45

## 2017-02-06 RX ADMIN — VITAMIN D, TAB 1000IU (100/BT) 2000 UNITS: 25 TAB at 09:03

## 2017-02-06 RX ADMIN — BUSPIRONE HYDROCHLORIDE 15 MG: 5 TABLET ORAL at 21:37

## 2017-02-06 RX ADMIN — METRONIDAZOLE 500 MG: 500 INJECTION, SOLUTION INTRAVENOUS at 02:32

## 2017-02-06 RX ADMIN — METRONIDAZOLE 500 MG: 500 INJECTION, SOLUTION INTRAVENOUS at 12:12

## 2017-02-06 RX ADMIN — GUAIFENESIN 600 MG: 600 TABLET, EXTENDED RELEASE ORAL at 12:12

## 2017-02-06 RX ADMIN — LEVOTHYROXINE SODIUM 50 MCG: 50 TABLET ORAL at 05:28

## 2017-02-06 RX ADMIN — AZITHROMYCIN 500 MG: 250 TABLET, FILM COATED ORAL at 12:11

## 2017-02-06 RX ADMIN — CITALOPRAM HYDROBROMIDE 40 MG: 20 TABLET ORAL at 09:03

## 2017-02-06 RX ADMIN — NYSTATIN 1 APPLICATION: 100000 POWDER TOPICAL at 21:40

## 2017-02-06 RX ADMIN — MINERAL OIL AND WHITE PETROLATUM 1 APPLICATION: 150; 830 OINTMENT OPHTHALMIC at 18:52

## 2017-02-06 RX ADMIN — QUETIAPINE FUMARATE 100 MG: 100 TABLET ORAL at 21:40

## 2017-02-06 RX ADMIN — LORAZEPAM 0.5 MG: 0.5 TABLET ORAL at 15:47

## 2017-02-06 RX ADMIN — GUAIFENESIN 600 MG: 600 TABLET, EXTENDED RELEASE ORAL at 18:51

## 2017-02-06 RX ADMIN — HEPARIN SODIUM 5000 UNITS: 5000 INJECTION, SOLUTION INTRAVENOUS; SUBCUTANEOUS at 14:26

## 2017-02-06 RX ADMIN — METRONIDAZOLE 500 MG: 500 INJECTION, SOLUTION INTRAVENOUS at 19:42

## 2017-02-07 LAB
ANION GAP SERPL CALCULATED.3IONS-SCNC: 8 MMOL/L (ref 4–13)
ATRIAL RATE: 85 BPM
BASOPHILS # BLD MANUAL: 0 THOUSAND/UL (ref 0–0.1)
BASOPHILS NFR MAR MANUAL: 0 % (ref 0–1)
BUN SERPL-MCNC: 59 MG/DL (ref 5–25)
C DIFF TOX GENS STL QL NAA+PROBE: NORMAL
CALCIUM SERPL-MCNC: 8.8 MG/DL (ref 8.3–10.1)
CHLORIDE SERPL-SCNC: 106 MMOL/L (ref 100–108)
CO2 SERPL-SCNC: 28 MMOL/L (ref 21–32)
CREAT SERPL-MCNC: 1.69 MG/DL (ref 0.6–1.3)
EOSINOPHIL # BLD MANUAL: 0 THOUSAND/UL (ref 0–0.4)
EOSINOPHIL NFR BLD MANUAL: 0 % (ref 0–6)
ERYTHROCYTE [DISTWIDTH] IN BLOOD BY AUTOMATED COUNT: 13.9 % (ref 11.6–15.1)
GFR SERPL CREATININE-BSD FRML MDRD: 28.8 ML/MIN/1.73SQ M
GLUCOSE SERPL-MCNC: 107 MG/DL (ref 65–140)
GLUCOSE SERPL-MCNC: 126 MG/DL (ref 65–140)
GLUCOSE SERPL-MCNC: 152 MG/DL (ref 65–140)
GLUCOSE SERPL-MCNC: 152 MG/DL (ref 65–140)
GLUCOSE SERPL-MCNC: 156 MG/DL (ref 65–140)
GLUCOSE SERPL-MCNC: 198 MG/DL (ref 65–140)
GLUCOSE SERPL-MCNC: 210 MG/DL (ref 65–140)
HCT VFR BLD AUTO: 32.5 % (ref 34.8–46.1)
HGB BLD-MCNC: 9.9 G/DL (ref 11.5–15.4)
LYMPHOCYTES # BLD AUTO: 0.5 THOUSAND/UL (ref 0.6–4.47)
LYMPHOCYTES # BLD AUTO: 5 % (ref 14–44)
MCH RBC QN AUTO: 27.9 PG (ref 26.8–34.3)
MCHC RBC AUTO-ENTMCNC: 30.5 G/DL (ref 31.4–37.4)
MCV RBC AUTO: 92 FL (ref 82–98)
MONOCYTES # BLD AUTO: 0.7 THOUSAND/UL (ref 0–1.22)
MONOCYTES NFR BLD: 7 % (ref 4–12)
NEUTROPHILS # BLD MANUAL: 8.75 THOUSAND/UL (ref 1.85–7.62)
NEUTS SEG NFR BLD AUTO: 88 % (ref 43–75)
OVALOCYTES BLD QL SMEAR: PRESENT
P AXIS: 71 DEGREES
PLATELET # BLD AUTO: 211 THOUSANDS/UL (ref 149–390)
PLATELET BLD QL SMEAR: ADEQUATE
PMV BLD AUTO: 10.2 FL (ref 8.9–12.7)
POTASSIUM SERPL-SCNC: 3.9 MMOL/L (ref 3.5–5.3)
PR INTERVAL: 230 MS
QRS AXIS: -57 DEGREES
QRSD INTERVAL: 126 MS
QT INTERVAL: 384 MS
QTC INTERVAL: 453 MS
RBC # BLD AUTO: 3.55 MILLION/UL (ref 3.81–5.12)
RBC MORPH BLD: PRESENT
SODIUM SERPL-SCNC: 142 MMOL/L (ref 136–145)
T WAVE AXIS: 92 DEGREES
TOTAL CELLS COUNTED SPEC: 100
VENTRICULAR RATE: 84 BPM
WBC # BLD AUTO: 9.94 THOUSAND/UL (ref 4.31–10.16)

## 2017-02-07 PROCEDURE — 80048 BASIC METABOLIC PNL TOTAL CA: CPT | Performed by: INTERNAL MEDICINE

## 2017-02-07 PROCEDURE — 97163 PT EVAL HIGH COMPLEX 45 MIN: CPT

## 2017-02-07 PROCEDURE — 87493 C DIFF AMPLIFIED PROBE: CPT | Performed by: INTERNAL MEDICINE

## 2017-02-07 PROCEDURE — 85007 BL SMEAR W/DIFF WBC COUNT: CPT | Performed by: INTERNAL MEDICINE

## 2017-02-07 PROCEDURE — G8978 MOBILITY CURRENT STATUS: HCPCS

## 2017-02-07 PROCEDURE — 85027 COMPLETE CBC AUTOMATED: CPT | Performed by: INTERNAL MEDICINE

## 2017-02-07 PROCEDURE — 94668 MNPJ CHEST WALL SBSQ: CPT

## 2017-02-07 PROCEDURE — 82948 REAGENT STRIP/BLOOD GLUCOSE: CPT

## 2017-02-07 PROCEDURE — 94640 AIRWAY INHALATION TREATMENT: CPT

## 2017-02-07 PROCEDURE — 97110 THERAPEUTIC EXERCISES: CPT

## 2017-02-07 PROCEDURE — G8979 MOBILITY GOAL STATUS: HCPCS

## 2017-02-07 PROCEDURE — 94760 N-INVAS EAR/PLS OXIMETRY 1: CPT

## 2017-02-07 RX ORDER — PREDNISONE 20 MG/1
40 TABLET ORAL DAILY
Status: DISCONTINUED | OUTPATIENT
Start: 2017-02-08 | End: 2017-02-09

## 2017-02-07 RX ORDER — ALBUTEROL SULFATE 2.5 MG/3ML
2.5 SOLUTION RESPIRATORY (INHALATION) EVERY 6 HOURS PRN
Status: DISCONTINUED | OUTPATIENT
Start: 2017-02-07 | End: 2017-02-13 | Stop reason: HOSPADM

## 2017-02-07 RX ORDER — ALBUTEROL SULFATE 2.5 MG/3ML
SOLUTION RESPIRATORY (INHALATION)
Status: COMPLETED
Start: 2017-02-07 | End: 2017-02-07

## 2017-02-07 RX ORDER — POLYVINYL ALCOHOL 14 MG/ML
1 SOLUTION/ DROPS OPHTHALMIC 4 TIMES DAILY
Status: DISCONTINUED | OUTPATIENT
Start: 2017-02-07 | End: 2017-02-13 | Stop reason: HOSPADM

## 2017-02-07 RX ORDER — METRONIDAZOLE 500 MG/1
500 TABLET ORAL EVERY 8 HOURS SCHEDULED
Status: COMPLETED | OUTPATIENT
Start: 2017-02-07 | End: 2017-02-10

## 2017-02-07 RX ADMIN — HEPARIN SODIUM 5000 UNITS: 5000 INJECTION, SOLUTION INTRAVENOUS; SUBCUTANEOUS at 14:33

## 2017-02-07 RX ADMIN — LEVALBUTEROL HYDROCHLORIDE 1.25 MG: 1.25 SOLUTION, CONCENTRATE RESPIRATORY (INHALATION) at 14:02

## 2017-02-07 RX ADMIN — ISODIUM CHLORIDE 3 ML: 0.03 SOLUTION RESPIRATORY (INHALATION) at 14:02

## 2017-02-07 RX ADMIN — BUSPIRONE HYDROCHLORIDE 15 MG: 5 TABLET ORAL at 15:19

## 2017-02-07 RX ADMIN — INSULIN LISPRO 2 UNITS: 100 INJECTION, SOLUTION INTRAVENOUS; SUBCUTANEOUS at 17:46

## 2017-02-07 RX ADMIN — QUETIAPINE FUMARATE 25 MG: 25 TABLET, FILM COATED ORAL at 08:41

## 2017-02-07 RX ADMIN — ACETAMINOPHEN 650 MG: 325 TABLET ORAL at 08:52

## 2017-02-07 RX ADMIN — POLYVINYL ALCOHOL 1 DROP: 14 SOLUTION/ DROPS OPHTHALMIC at 14:33

## 2017-02-07 RX ADMIN — METRONIDAZOLE 500 MG: 500 INJECTION, SOLUTION INTRAVENOUS at 03:31

## 2017-02-07 RX ADMIN — LORAZEPAM 0.5 MG: 0.5 TABLET ORAL at 08:40

## 2017-02-07 RX ADMIN — LORAZEPAM 0.5 MG: 0.5 TABLET ORAL at 20:12

## 2017-02-07 RX ADMIN — INSULIN LISPRO 1 UNITS: 100 INJECTION, SOLUTION INTRAVENOUS; SUBCUTANEOUS at 08:03

## 2017-02-07 RX ADMIN — GUAIFENESIN 600 MG: 600 TABLET, EXTENDED RELEASE ORAL at 17:00

## 2017-02-07 RX ADMIN — HEPARIN SODIUM 5000 UNITS: 5000 INJECTION, SOLUTION INTRAVENOUS; SUBCUTANEOUS at 22:30

## 2017-02-07 RX ADMIN — BUSPIRONE HYDROCHLORIDE 15 MG: 5 TABLET ORAL at 08:16

## 2017-02-07 RX ADMIN — METRONIDAZOLE 500 MG: 500 INJECTION, SOLUTION INTRAVENOUS at 10:54

## 2017-02-07 RX ADMIN — VITAMIN D, TAB 1000IU (100/BT) 2000 UNITS: 25 TAB at 08:16

## 2017-02-07 RX ADMIN — METHYLPREDNISOLONE SODIUM SUCCINATE 40 MG: 40 INJECTION, POWDER, FOR SOLUTION INTRAMUSCULAR; INTRAVENOUS at 20:12

## 2017-02-07 RX ADMIN — PANTOPRAZOLE SODIUM 40 MG: 40 TABLET, DELAYED RELEASE ORAL at 06:03

## 2017-02-07 RX ADMIN — CEFTRIAXONE SODIUM 1000 MG: 10 INJECTION, POWDER, FOR SOLUTION INTRAVENOUS at 08:53

## 2017-02-07 RX ADMIN — ALBUTEROL SULFATE 2.5 MG: 2.5 SOLUTION RESPIRATORY (INHALATION) at 03:50

## 2017-02-07 RX ADMIN — ISODIUM CHLORIDE 3 ML: 0.03 SOLUTION RESPIRATORY (INHALATION) at 07:30

## 2017-02-07 RX ADMIN — INSULIN LISPRO 1 UNITS: 100 INJECTION, SOLUTION INTRAVENOUS; SUBCUTANEOUS at 12:43

## 2017-02-07 RX ADMIN — METRONIDAZOLE 500 MG: 500 TABLET ORAL at 20:12

## 2017-02-07 RX ADMIN — LEVALBUTEROL HYDROCHLORIDE 1.25 MG: 1.25 SOLUTION, CONCENTRATE RESPIRATORY (INHALATION) at 07:30

## 2017-02-07 RX ADMIN — GUAIFENESIN 600 MG: 600 TABLET, EXTENDED RELEASE ORAL at 08:41

## 2017-02-07 RX ADMIN — CITALOPRAM HYDROBROMIDE 40 MG: 20 TABLET ORAL at 08:16

## 2017-02-07 RX ADMIN — POLYVINYL ALCOHOL 1 DROP: 14 SOLUTION/ DROPS OPHTHALMIC at 22:31

## 2017-02-07 RX ADMIN — LORAZEPAM 0.5 MG: 0.5 TABLET ORAL at 15:19

## 2017-02-07 RX ADMIN — QUETIAPINE FUMARATE 100 MG: 100 TABLET ORAL at 22:30

## 2017-02-07 RX ADMIN — AZITHROMYCIN 500 MG: 250 TABLET, FILM COATED ORAL at 10:56

## 2017-02-07 RX ADMIN — LEVOTHYROXINE SODIUM 50 MCG: 50 TABLET ORAL at 05:36

## 2017-02-07 RX ADMIN — ISODIUM CHLORIDE 3 ML: 0.03 SOLUTION RESPIRATORY (INHALATION) at 19:40

## 2017-02-07 RX ADMIN — BUSPIRONE HYDROCHLORIDE 15 MG: 5 TABLET ORAL at 20:12

## 2017-02-07 RX ADMIN — HEPARIN SODIUM 5000 UNITS: 5000 INJECTION, SOLUTION INTRAVENOUS; SUBCUTANEOUS at 05:36

## 2017-02-07 RX ADMIN — METHYLPREDNISOLONE SODIUM SUCCINATE 40 MG: 40 INJECTION, POWDER, FOR SOLUTION INTRAMUSCULAR; INTRAVENOUS at 08:40

## 2017-02-07 RX ADMIN — MINERAL OIL AND WHITE PETROLATUM 1 APPLICATION: 150; 830 OINTMENT OPHTHALMIC at 08:11

## 2017-02-07 RX ADMIN — QUETIAPINE FUMARATE 50 MG: 25 TABLET, FILM COATED ORAL at 22:31

## 2017-02-07 RX ADMIN — LEVALBUTEROL HYDROCHLORIDE 1.25 MG: 1.25 SOLUTION, CONCENTRATE RESPIRATORY (INHALATION) at 19:40

## 2017-02-07 RX ADMIN — POLYVINYL ALCOHOL 1 DROP: 14 SOLUTION/ DROPS OPHTHALMIC at 17:00

## 2017-02-08 PROBLEM — N17.9 AKI (ACUTE KIDNEY INJURY) (HCC): Status: RESOLVED | Noted: 2017-02-04 | Resolved: 2017-02-08

## 2017-02-08 PROBLEM — I21.4 NSTEMI (NON-ST ELEVATED MYOCARDIAL INFARCTION) (HCC): Status: RESOLVED | Noted: 2017-02-05 | Resolved: 2017-02-08

## 2017-02-08 PROBLEM — J96.21 ACUTE ON CHRONIC RESPIRATORY FAILURE WITH HYPOXIA (HCC): Status: RESOLVED | Noted: 2017-02-04 | Resolved: 2017-02-08

## 2017-02-08 LAB
BACTERIA SPT RESP CULT: NORMAL
GLUCOSE SERPL-MCNC: 111 MG/DL (ref 65–140)
GLUCOSE SERPL-MCNC: 121 MG/DL (ref 65–140)
GLUCOSE SERPL-MCNC: 159 MG/DL (ref 65–140)
GLUCOSE SERPL-MCNC: 81 MG/DL (ref 65–140)
GLUCOSE SERPL-MCNC: 92 MG/DL (ref 65–140)
GRAM STN SPEC: NORMAL
GRAM STN SPEC: NORMAL

## 2017-02-08 PROCEDURE — 94640 AIRWAY INHALATION TREATMENT: CPT

## 2017-02-08 PROCEDURE — 82948 REAGENT STRIP/BLOOD GLUCOSE: CPT

## 2017-02-08 PROCEDURE — 97110 THERAPEUTIC EXERCISES: CPT

## 2017-02-08 PROCEDURE — 97116 GAIT TRAINING THERAPY: CPT

## 2017-02-08 PROCEDURE — 94760 N-INVAS EAR/PLS OXIMETRY 1: CPT

## 2017-02-08 PROCEDURE — 94668 MNPJ CHEST WALL SBSQ: CPT

## 2017-02-08 RX ORDER — CEFDINIR 300 MG/1
300 CAPSULE ORAL 2 TIMES DAILY
Qty: 10 CAPSULE | Refills: 0 | Status: SHIPPED | OUTPATIENT
Start: 2017-02-08 | End: 2017-02-13 | Stop reason: HOSPADM

## 2017-02-08 RX ORDER — LABETALOL HYDROCHLORIDE 5 MG/ML
10 INJECTION, SOLUTION INTRAVENOUS ONCE
Status: COMPLETED | OUTPATIENT
Start: 2017-02-08 | End: 2017-02-08

## 2017-02-08 RX ORDER — GUAIFENESIN 600 MG
600 TABLET, EXTENDED RELEASE 12 HR ORAL 2 TIMES DAILY
Qty: 20 TABLET | Refills: 0 | Status: SHIPPED | OUTPATIENT
Start: 2017-02-08 | End: 2017-02-18

## 2017-02-08 RX ORDER — CEFDINIR 300 MG/1
300 CAPSULE ORAL EVERY 12 HOURS SCHEDULED
Status: COMPLETED | OUTPATIENT
Start: 2017-02-08 | End: 2017-02-10

## 2017-02-08 RX ORDER — AZITHROMYCIN 500 MG/1
500 TABLET, FILM COATED ORAL EVERY 24 HOURS
Qty: 3 TABLET | Refills: 0 | Status: SHIPPED | OUTPATIENT
Start: 2017-02-08 | End: 2017-02-11

## 2017-02-08 RX ORDER — METHYLPREDNISOLONE SODIUM SUCCINATE 40 MG/ML
40 INJECTION, POWDER, LYOPHILIZED, FOR SOLUTION INTRAMUSCULAR; INTRAVENOUS EVERY 12 HOURS SCHEDULED
Status: DISCONTINUED | OUTPATIENT
Start: 2017-02-08 | End: 2017-02-08

## 2017-02-08 RX ORDER — PREDNISONE 20 MG/1
40 TABLET ORAL DAILY
Qty: 30 TABLET | Refills: 0 | Status: SHIPPED | OUTPATIENT
Start: 2017-02-08 | End: 2017-02-11

## 2017-02-08 RX ORDER — PREDNISONE 20 MG/1
40 TABLET ORAL DAILY
Status: DISCONTINUED | OUTPATIENT
Start: 2017-02-08 | End: 2017-02-08

## 2017-02-08 RX ORDER — SODIUM CHLORIDE FOR INHALATION 0.9 %
3 VIAL, NEBULIZER (ML) INHALATION
Qty: 270 ML | Refills: 0 | Status: SHIPPED | OUTPATIENT
Start: 2017-02-08 | End: 2017-03-10

## 2017-02-08 RX ORDER — METRONIDAZOLE 500 MG/1
500 TABLET ORAL EVERY 8 HOURS SCHEDULED
Qty: 9 TABLET | Refills: 0 | Status: SHIPPED | OUTPATIENT
Start: 2017-02-08 | End: 2017-02-11

## 2017-02-08 RX ORDER — LEVALBUTEROL 1.25 MG/.5ML
1.25 SOLUTION, CONCENTRATE RESPIRATORY (INHALATION)
Qty: 45 ML | Refills: 0 | Status: SHIPPED | OUTPATIENT
Start: 2017-02-08 | End: 2017-03-02 | Stop reason: ALTCHOICE

## 2017-02-08 RX ADMIN — NYSTATIN 1 APPLICATION: 100000 POWDER TOPICAL at 08:34

## 2017-02-08 RX ADMIN — LORAZEPAM 0.5 MG: 0.5 TABLET ORAL at 13:47

## 2017-02-08 RX ADMIN — VITAMIN D, TAB 1000IU (100/BT) 2000 UNITS: 25 TAB at 08:30

## 2017-02-08 RX ADMIN — BUSPIRONE HYDROCHLORIDE 15 MG: 5 TABLET ORAL at 15:40

## 2017-02-08 RX ADMIN — ISODIUM CHLORIDE 3 ML: 0.03 SOLUTION RESPIRATORY (INHALATION) at 20:42

## 2017-02-08 RX ADMIN — LEVALBUTEROL HYDROCHLORIDE 1.25 MG: 1.25 SOLUTION, CONCENTRATE RESPIRATORY (INHALATION) at 20:42

## 2017-02-08 RX ADMIN — QUETIAPINE FUMARATE 25 MG: 25 TABLET, FILM COATED ORAL at 08:31

## 2017-02-08 RX ADMIN — METRONIDAZOLE 500 MG: 500 TABLET ORAL at 20:47

## 2017-02-08 RX ADMIN — POLYVINYL ALCOHOL 1 DROP: 14 SOLUTION/ DROPS OPHTHALMIC at 18:38

## 2017-02-08 RX ADMIN — QUETIAPINE FUMARATE 100 MG: 100 TABLET ORAL at 20:46

## 2017-02-08 RX ADMIN — ACETAMINOPHEN 650 MG: 325 TABLET ORAL at 16:28

## 2017-02-08 RX ADMIN — GUAIFENESIN 600 MG: 600 TABLET, EXTENDED RELEASE ORAL at 08:31

## 2017-02-08 RX ADMIN — LEVOTHYROXINE SODIUM 50 MCG: 50 TABLET ORAL at 05:00

## 2017-02-08 RX ADMIN — METRONIDAZOLE 500 MG: 500 TABLET ORAL at 15:00

## 2017-02-08 RX ADMIN — HEPARIN SODIUM 5000 UNITS: 5000 INJECTION, SOLUTION INTRAVENOUS; SUBCUTANEOUS at 05:00

## 2017-02-08 RX ADMIN — NYSTATIN 1 APPLICATION: 100000 POWDER TOPICAL at 20:48

## 2017-02-08 RX ADMIN — PANTOPRAZOLE SODIUM 40 MG: 40 TABLET, DELAYED RELEASE ORAL at 05:00

## 2017-02-08 RX ADMIN — HEPARIN SODIUM 5000 UNITS: 5000 INJECTION, SOLUTION INTRAVENOUS; SUBCUTANEOUS at 22:04

## 2017-02-08 RX ADMIN — HYDROCHLOROTHIAZIDE: 25 TABLET ORAL at 23:24

## 2017-02-08 RX ADMIN — AZITHROMYCIN 500 MG: 250 TABLET, FILM COATED ORAL at 10:53

## 2017-02-08 RX ADMIN — ALBUTEROL SULFATE 2.5 MG: 2.5 SOLUTION RESPIRATORY (INHALATION) at 00:19

## 2017-02-08 RX ADMIN — ISODIUM CHLORIDE 3 ML: 0.03 SOLUTION RESPIRATORY (INHALATION) at 13:41

## 2017-02-08 RX ADMIN — PREDNISONE 40 MG: 20 TABLET ORAL at 08:31

## 2017-02-08 RX ADMIN — CEFDINIR 300 MG: 300 CAPSULE ORAL at 11:06

## 2017-02-08 RX ADMIN — Medication 5 DROP: at 08:35

## 2017-02-08 RX ADMIN — ISODIUM CHLORIDE 3 ML: 0.03 SOLUTION RESPIRATORY (INHALATION) at 07:16

## 2017-02-08 RX ADMIN — LABETALOL HYDROCHLORIDE 10 MG: 5 INJECTION, SOLUTION INTRAVENOUS at 10:53

## 2017-02-08 RX ADMIN — POLYVINYL ALCOHOL 1 DROP: 14 SOLUTION/ DROPS OPHTHALMIC at 14:00

## 2017-02-08 RX ADMIN — CEFDINIR 300 MG: 300 CAPSULE ORAL at 20:47

## 2017-02-08 RX ADMIN — METOPROLOL SUCCINATE 75 MG: 50 TABLET, FILM COATED, EXTENDED RELEASE ORAL at 23:30

## 2017-02-08 RX ADMIN — QUETIAPINE FUMARATE 50 MG: 25 TABLET, FILM COATED ORAL at 20:46

## 2017-02-08 RX ADMIN — LEVALBUTEROL HYDROCHLORIDE 1.25 MG: 1.25 SOLUTION, CONCENTRATE RESPIRATORY (INHALATION) at 07:15

## 2017-02-08 RX ADMIN — BUSPIRONE HYDROCHLORIDE 15 MG: 5 TABLET ORAL at 08:30

## 2017-02-08 RX ADMIN — METRONIDAZOLE 500 MG: 500 TABLET ORAL at 05:00

## 2017-02-08 RX ADMIN — NYSTATIN 1 APPLICATION: 100000 POWDER TOPICAL at 16:00

## 2017-02-08 RX ADMIN — LORAZEPAM 0.5 MG: 0.5 TABLET ORAL at 08:31

## 2017-02-08 RX ADMIN — BUSPIRONE HYDROCHLORIDE 15 MG: 5 TABLET ORAL at 20:45

## 2017-02-08 RX ADMIN — POLYVINYL ALCOHOL 1 DROP: 14 SOLUTION/ DROPS OPHTHALMIC at 08:36

## 2017-02-08 RX ADMIN — LORAZEPAM 0.5 MG: 0.5 TABLET ORAL at 20:47

## 2017-02-08 RX ADMIN — GUAIFENESIN 600 MG: 600 TABLET, EXTENDED RELEASE ORAL at 18:37

## 2017-02-08 RX ADMIN — POLYVINYL ALCOHOL 1 DROP: 14 SOLUTION/ DROPS OPHTHALMIC at 22:04

## 2017-02-08 RX ADMIN — LEVALBUTEROL HYDROCHLORIDE 1.25 MG: 1.25 SOLUTION, CONCENTRATE RESPIRATORY (INHALATION) at 13:41

## 2017-02-08 RX ADMIN — CITALOPRAM HYDROBROMIDE 40 MG: 20 TABLET ORAL at 08:30

## 2017-02-09 ENCOUNTER — APPOINTMENT (INPATIENT)
Dept: RADIOLOGY | Facility: HOSPITAL | Age: 82
DRG: 177 | End: 2017-02-09
Payer: MEDICARE

## 2017-02-09 LAB
ARTERIAL PATENCY WRIST A: ABNORMAL
BACTERIA BLD CULT: NORMAL
BACTERIA BLD CULT: NORMAL
BASE EXCESS BLDA CALC-SCNC: 2 MMOL/L (ref -2–3)
CA-I BLD-SCNC: 1.25 MMOL/L (ref 1.12–1.32)
DS:DELIVERY SYSTEM: ABNORMAL
FIO2 GAS DIL.REBREATH: 40 L
GLUCOSE SERPL-MCNC: 100 MG/DL (ref 65–140)
GLUCOSE SERPL-MCNC: 117 MG/DL (ref 65–140)
GLUCOSE SERPL-MCNC: 70 MG/DL (ref 65–140)
GLUCOSE SERPL-MCNC: 82 MG/DL (ref 65–140)
GLUCOSE SERPL-MCNC: 99 MG/DL (ref 65–140)
HCO3 BLDA-SCNC: 28.1 MMOL/L (ref 22–28)
HCT VFR BLD CALC: 31 % (ref 34.8–46.1)
HGB BLDA-MCNC: 10.5 G/DL (ref 11.5–15.4)
PCO2 BLD: 30 MMOL/L (ref 21–32)
PCO2 BLD: 48.1 MM HG (ref 36–44)
PH BLD: 7.38 [PH] (ref 7.35–7.45)
PO2 BLD: 171 MM HG (ref 75–129)
POTASSIUM BLD-SCNC: 4.6 MMOL/L (ref 3.5–5.3)
SAMPLE SITE: ABNORMAL
SAO2 % BLD FROM PO2: 99 % (ref 95–98)
SODIUM BLD-SCNC: 140 MMOL/L (ref 136–145)
SPECIMEN SOURCE: ABNORMAL

## 2017-02-09 PROCEDURE — 85014 HEMATOCRIT: CPT

## 2017-02-09 PROCEDURE — 82330 ASSAY OF CALCIUM: CPT

## 2017-02-09 PROCEDURE — 97110 THERAPEUTIC EXERCISES: CPT

## 2017-02-09 PROCEDURE — 82803 BLOOD GASES ANY COMBINATION: CPT

## 2017-02-09 PROCEDURE — 84132 ASSAY OF SERUM POTASSIUM: CPT

## 2017-02-09 PROCEDURE — 94640 AIRWAY INHALATION TREATMENT: CPT

## 2017-02-09 PROCEDURE — 82947 ASSAY GLUCOSE BLOOD QUANT: CPT

## 2017-02-09 PROCEDURE — 94760 N-INVAS EAR/PLS OXIMETRY 1: CPT

## 2017-02-09 PROCEDURE — 71010 HB CHEST X-RAY 1 VIEW FRONTAL (PORTABLE): CPT

## 2017-02-09 PROCEDURE — 97116 GAIT TRAINING THERAPY: CPT

## 2017-02-09 PROCEDURE — 94668 MNPJ CHEST WALL SBSQ: CPT

## 2017-02-09 PROCEDURE — 36600 WITHDRAWAL OF ARTERIAL BLOOD: CPT

## 2017-02-09 PROCEDURE — 84295 ASSAY OF SERUM SODIUM: CPT

## 2017-02-09 PROCEDURE — 82948 REAGENT STRIP/BLOOD GLUCOSE: CPT

## 2017-02-09 RX ORDER — LEVALBUTEROL 1.25 MG/.5ML
1.25 SOLUTION, CONCENTRATE RESPIRATORY (INHALATION)
Status: DISCONTINUED | OUTPATIENT
Start: 2017-02-09 | End: 2017-02-13 | Stop reason: HOSPADM

## 2017-02-09 RX ORDER — LOPERAMIDE HYDROCHLORIDE 2 MG/1
2 CAPSULE ORAL 3 TIMES DAILY PRN
Status: DISCONTINUED | OUTPATIENT
Start: 2017-02-09 | End: 2017-02-13 | Stop reason: HOSPADM

## 2017-02-09 RX ORDER — SODIUM CHLORIDE FOR INHALATION 0.9 %
3 VIAL, NEBULIZER (ML) INHALATION
Status: DISCONTINUED | OUTPATIENT
Start: 2017-02-09 | End: 2017-02-13 | Stop reason: HOSPADM

## 2017-02-09 RX ORDER — METHYLPREDNISOLONE SODIUM SUCCINATE 40 MG/ML
40 INJECTION, POWDER, LYOPHILIZED, FOR SOLUTION INTRAMUSCULAR; INTRAVENOUS EVERY 12 HOURS SCHEDULED
Status: DISCONTINUED | OUTPATIENT
Start: 2017-02-09 | End: 2017-02-11

## 2017-02-09 RX ADMIN — PREDNISONE 40 MG: 20 TABLET ORAL at 08:22

## 2017-02-09 RX ADMIN — NYSTATIN 1 APPLICATION: 100000 POWDER TOPICAL at 21:53

## 2017-02-09 RX ADMIN — LEVALBUTEROL HYDROCHLORIDE 1.25 MG: 1.25 SOLUTION, CONCENTRATE RESPIRATORY (INHALATION) at 07:19

## 2017-02-09 RX ADMIN — ALBUTEROL SULFATE 2.5 MG: 2.5 SOLUTION RESPIRATORY (INHALATION) at 02:54

## 2017-02-09 RX ADMIN — METRONIDAZOLE 500 MG: 500 TABLET ORAL at 05:49

## 2017-02-09 RX ADMIN — AZITHROMYCIN 500 MG: 250 TABLET, FILM COATED ORAL at 08:23

## 2017-02-09 RX ADMIN — METRONIDAZOLE 500 MG: 500 TABLET ORAL at 13:54

## 2017-02-09 RX ADMIN — LORAZEPAM 0.5 MG: 0.5 TABLET ORAL at 21:52

## 2017-02-09 RX ADMIN — CITALOPRAM HYDROBROMIDE 40 MG: 20 TABLET ORAL at 08:22

## 2017-02-09 RX ADMIN — LEVALBUTEROL HYDROCHLORIDE 1.25 MG: 1.25 SOLUTION, CONCENTRATE RESPIRATORY (INHALATION) at 17:59

## 2017-02-09 RX ADMIN — METRONIDAZOLE 500 MG: 500 TABLET ORAL at 21:52

## 2017-02-09 RX ADMIN — ISODIUM CHLORIDE 3 ML: 0.03 SOLUTION RESPIRATORY (INHALATION) at 13:46

## 2017-02-09 RX ADMIN — NYSTATIN 1 APPLICATION: 100000 POWDER TOPICAL at 17:31

## 2017-02-09 RX ADMIN — VITAMIN D, TAB 1000IU (100/BT) 2000 UNITS: 25 TAB at 08:22

## 2017-02-09 RX ADMIN — NYSTATIN 1 APPLICATION: 100000 POWDER TOPICAL at 08:23

## 2017-02-09 RX ADMIN — HEPARIN SODIUM 5000 UNITS: 5000 INJECTION, SOLUTION INTRAVENOUS; SUBCUTANEOUS at 13:54

## 2017-02-09 RX ADMIN — GUAIFENESIN 600 MG: 600 TABLET, EXTENDED RELEASE ORAL at 17:30

## 2017-02-09 RX ADMIN — BUSPIRONE HYDROCHLORIDE 15 MG: 5 TABLET ORAL at 21:52

## 2017-02-09 RX ADMIN — METHYLPREDNISOLONE SODIUM SUCCINATE 40 MG: 40 INJECTION, POWDER, FOR SOLUTION INTRAMUSCULAR; INTRAVENOUS at 21:53

## 2017-02-09 RX ADMIN — BUSPIRONE HYDROCHLORIDE 15 MG: 5 TABLET ORAL at 17:29

## 2017-02-09 RX ADMIN — POLYVINYL ALCOHOL 1 DROP: 14 SOLUTION/ DROPS OPHTHALMIC at 08:23

## 2017-02-09 RX ADMIN — ISODIUM CHLORIDE 3 ML: 0.03 SOLUTION RESPIRATORY (INHALATION) at 17:59

## 2017-02-09 RX ADMIN — LEVALBUTEROL HYDROCHLORIDE 1.25 MG: 1.25 SOLUTION, CONCENTRATE RESPIRATORY (INHALATION) at 13:46

## 2017-02-09 RX ADMIN — LEVOTHYROXINE SODIUM 50 MCG: 50 TABLET ORAL at 05:49

## 2017-02-09 RX ADMIN — HEPARIN SODIUM 5000 UNITS: 5000 INJECTION, SOLUTION INTRAVENOUS; SUBCUTANEOUS at 21:54

## 2017-02-09 RX ADMIN — QUETIAPINE FUMARATE 100 MG: 100 TABLET ORAL at 21:52

## 2017-02-09 RX ADMIN — ISODIUM CHLORIDE 3 ML: 0.03 SOLUTION RESPIRATORY (INHALATION) at 07:19

## 2017-02-09 RX ADMIN — POLYVINYL ALCOHOL 1 DROP: 14 SOLUTION/ DROPS OPHTHALMIC at 22:00

## 2017-02-09 RX ADMIN — POLYVINYL ALCOHOL 1 DROP: 14 SOLUTION/ DROPS OPHTHALMIC at 17:32

## 2017-02-09 RX ADMIN — CEFDINIR 300 MG: 300 CAPSULE ORAL at 08:22

## 2017-02-09 RX ADMIN — ALBUTEROL SULFATE 2.5 MG: 2.5 SOLUTION RESPIRATORY (INHALATION) at 11:31

## 2017-02-09 RX ADMIN — METHYLPREDNISOLONE SODIUM SUCCINATE 40 MG: 40 INJECTION, POWDER, FOR SOLUTION INTRAMUSCULAR; INTRAVENOUS at 12:10

## 2017-02-09 RX ADMIN — QUETIAPINE FUMARATE 50 MG: 25 TABLET, FILM COATED ORAL at 21:58

## 2017-02-09 RX ADMIN — BUSPIRONE HYDROCHLORIDE 15 MG: 5 TABLET ORAL at 08:22

## 2017-02-09 RX ADMIN — QUETIAPINE FUMARATE 25 MG: 25 TABLET, FILM COATED ORAL at 08:22

## 2017-02-09 RX ADMIN — METOPROLOL SUCCINATE 75 MG: 50 TABLET, FILM COATED, EXTENDED RELEASE ORAL at 08:21

## 2017-02-09 RX ADMIN — GUAIFENESIN 600 MG: 600 TABLET, EXTENDED RELEASE ORAL at 08:21

## 2017-02-09 RX ADMIN — HEPARIN SODIUM 5000 UNITS: 5000 INJECTION, SOLUTION INTRAVENOUS; SUBCUTANEOUS at 05:48

## 2017-02-09 RX ADMIN — CEFDINIR 300 MG: 300 CAPSULE ORAL at 21:52

## 2017-02-09 RX ADMIN — HYDROCHLOROTHIAZIDE: 25 TABLET ORAL at 08:22

## 2017-02-09 RX ADMIN — PANTOPRAZOLE SODIUM 40 MG: 40 TABLET, DELAYED RELEASE ORAL at 05:49

## 2017-02-09 RX ADMIN — LORAZEPAM 0.5 MG: 0.5 TABLET ORAL at 08:22

## 2017-02-09 RX ADMIN — LORAZEPAM 0.5 MG: 0.5 TABLET ORAL at 17:30

## 2017-02-10 LAB
ALBUMIN SERPL BCP-MCNC: 3.3 G/DL (ref 3.5–5)
ALP SERPL-CCNC: 35 U/L (ref 46–116)
ALT SERPL W P-5'-P-CCNC: 20 U/L (ref 12–78)
ANION GAP SERPL CALCULATED.3IONS-SCNC: 8 MMOL/L (ref 4–13)
AST SERPL W P-5'-P-CCNC: 22 U/L (ref 5–45)
BASOPHILS # BLD MANUAL: 0 THOUSAND/UL (ref 0–0.1)
BASOPHILS NFR MAR MANUAL: 0 % (ref 0–1)
BILIRUB SERPL-MCNC: 0.3 MG/DL (ref 0.2–1)
BUN SERPL-MCNC: 45 MG/DL (ref 5–25)
CALCIUM SERPL-MCNC: 9.2 MG/DL (ref 8.3–10.1)
CHLORIDE SERPL-SCNC: 105 MMOL/L (ref 100–108)
CO2 SERPL-SCNC: 29 MMOL/L (ref 21–32)
CREAT SERPL-MCNC: 1.57 MG/DL (ref 0.6–1.3)
EOSINOPHIL # BLD MANUAL: 0 THOUSAND/UL (ref 0–0.4)
EOSINOPHIL NFR BLD MANUAL: 0 % (ref 0–6)
ERYTHROCYTE [DISTWIDTH] IN BLOOD BY AUTOMATED COUNT: 13.9 % (ref 11.6–15.1)
GFR SERPL CREATININE-BSD FRML MDRD: 31.3 ML/MIN/1.73SQ M
GLUCOSE SERPL-MCNC: 101 MG/DL (ref 65–140)
GLUCOSE SERPL-MCNC: 105 MG/DL (ref 65–140)
GLUCOSE SERPL-MCNC: 123 MG/DL (ref 65–140)
GLUCOSE SERPL-MCNC: 124 MG/DL (ref 65–140)
GLUCOSE SERPL-MCNC: 126 MG/DL (ref 65–140)
GLUCOSE SERPL-MCNC: 177 MG/DL (ref 65–140)
HCT VFR BLD AUTO: 36.8 % (ref 34.8–46.1)
HGB BLD-MCNC: 11.1 G/DL (ref 11.5–15.4)
LYMPHOCYTES # BLD AUTO: 1.12 THOUSAND/UL (ref 0.6–4.47)
LYMPHOCYTES # BLD AUTO: 10 % (ref 14–44)
MCH RBC QN AUTO: 27.8 PG (ref 26.8–34.3)
MCHC RBC AUTO-ENTMCNC: 30.2 G/DL (ref 31.4–37.4)
MCV RBC AUTO: 92 FL (ref 82–98)
MONOCYTES # BLD AUTO: 0.78 THOUSAND/UL (ref 0–1.22)
MONOCYTES NFR BLD: 7 % (ref 4–12)
NEUTROPHILS # BLD MANUAL: 9.28 THOUSAND/UL (ref 1.85–7.62)
NEUTS BAND NFR BLD MANUAL: 2 % (ref 0–8)
NEUTS SEG NFR BLD AUTO: 81 % (ref 43–75)
PLATELET # BLD AUTO: 234 THOUSANDS/UL (ref 149–390)
PLATELET BLD QL SMEAR: ADEQUATE
PMV BLD AUTO: 9.8 FL (ref 8.9–12.7)
POTASSIUM SERPL-SCNC: 4.8 MMOL/L (ref 3.5–5.3)
PROT SERPL-MCNC: 6.7 G/DL (ref 6.4–8.2)
RBC # BLD AUTO: 4 MILLION/UL (ref 3.81–5.12)
SODIUM SERPL-SCNC: 142 MMOL/L (ref 136–145)
TOTAL CELLS COUNTED SPEC: 100
WBC # BLD AUTO: 11.18 THOUSAND/UL (ref 4.31–10.16)

## 2017-02-10 PROCEDURE — 80053 COMPREHEN METABOLIC PANEL: CPT | Performed by: FAMILY MEDICINE

## 2017-02-10 PROCEDURE — 94640 AIRWAY INHALATION TREATMENT: CPT

## 2017-02-10 PROCEDURE — 94664 DEMO&/EVAL PT USE INHALER: CPT

## 2017-02-10 PROCEDURE — 85027 COMPLETE CBC AUTOMATED: CPT | Performed by: FAMILY MEDICINE

## 2017-02-10 PROCEDURE — 85007 BL SMEAR W/DIFF WBC COUNT: CPT | Performed by: FAMILY MEDICINE

## 2017-02-10 PROCEDURE — 82948 REAGENT STRIP/BLOOD GLUCOSE: CPT

## 2017-02-10 PROCEDURE — 94760 N-INVAS EAR/PLS OXIMETRY 1: CPT

## 2017-02-10 RX ORDER — TORSEMIDE 20 MG/1
20 TABLET ORAL DAILY
Status: DISCONTINUED | OUTPATIENT
Start: 2017-02-10 | End: 2017-02-12

## 2017-02-10 RX ORDER — FUROSEMIDE 10 MG/ML
40 INJECTION INTRAMUSCULAR; INTRAVENOUS ONCE
Status: COMPLETED | OUTPATIENT
Start: 2017-02-10 | End: 2017-02-10

## 2017-02-10 RX ADMIN — POLYVINYL ALCOHOL 1 DROP: 14 SOLUTION/ DROPS OPHTHALMIC at 13:51

## 2017-02-10 RX ADMIN — HYDROCHLOROTHIAZIDE: 25 TABLET ORAL at 09:06

## 2017-02-10 RX ADMIN — TORSEMIDE 20 MG: 20 TABLET ORAL at 16:23

## 2017-02-10 RX ADMIN — CEFDINIR 300 MG: 300 CAPSULE ORAL at 22:00

## 2017-02-10 RX ADMIN — ISODIUM CHLORIDE 3 ML: 0.03 SOLUTION RESPIRATORY (INHALATION) at 19:26

## 2017-02-10 RX ADMIN — Medication 5 DROP: at 09:08

## 2017-02-10 RX ADMIN — CITALOPRAM HYDROBROMIDE 40 MG: 20 TABLET ORAL at 09:06

## 2017-02-10 RX ADMIN — METRONIDAZOLE 500 MG: 500 TABLET ORAL at 06:32

## 2017-02-10 RX ADMIN — METRONIDAZOLE 500 MG: 500 TABLET ORAL at 22:02

## 2017-02-10 RX ADMIN — LORAZEPAM 0.5 MG: 0.5 TABLET ORAL at 22:00

## 2017-02-10 RX ADMIN — HEPARIN SODIUM 5000 UNITS: 5000 INJECTION, SOLUTION INTRAVENOUS; SUBCUTANEOUS at 06:32

## 2017-02-10 RX ADMIN — BUSPIRONE HYDROCHLORIDE 15 MG: 5 TABLET ORAL at 09:06

## 2017-02-10 RX ADMIN — POLYVINYL ALCOHOL 1 DROP: 14 SOLUTION/ DROPS OPHTHALMIC at 18:48

## 2017-02-10 RX ADMIN — CEFDINIR 300 MG: 300 CAPSULE ORAL at 09:06

## 2017-02-10 RX ADMIN — NYSTATIN 1 APPLICATION: 100000 POWDER TOPICAL at 22:00

## 2017-02-10 RX ADMIN — POLYVINYL ALCOHOL 1 DROP: 14 SOLUTION/ DROPS OPHTHALMIC at 22:04

## 2017-02-10 RX ADMIN — LEVALBUTEROL HYDROCHLORIDE 1.25 MG: 1.25 SOLUTION, CONCENTRATE RESPIRATORY (INHALATION) at 14:05

## 2017-02-10 RX ADMIN — GUAIFENESIN 600 MG: 600 TABLET, EXTENDED RELEASE ORAL at 09:06

## 2017-02-10 RX ADMIN — METHYLPREDNISOLONE SODIUM SUCCINATE 40 MG: 40 INJECTION, POWDER, FOR SOLUTION INTRAMUSCULAR; INTRAVENOUS at 09:06

## 2017-02-10 RX ADMIN — ISODIUM CHLORIDE 3 ML: 0.03 SOLUTION RESPIRATORY (INHALATION) at 08:19

## 2017-02-10 RX ADMIN — LEVALBUTEROL HYDROCHLORIDE 1.25 MG: 1.25 SOLUTION, CONCENTRATE RESPIRATORY (INHALATION) at 19:26

## 2017-02-10 RX ADMIN — PANTOPRAZOLE SODIUM 40 MG: 40 TABLET, DELAYED RELEASE ORAL at 06:32

## 2017-02-10 RX ADMIN — FUROSEMIDE 40 MG: 10 INJECTION, SOLUTION INTRAMUSCULAR; INTRAVENOUS at 16:24

## 2017-02-10 RX ADMIN — AZITHROMYCIN 500 MG: 250 TABLET, FILM COATED ORAL at 09:05

## 2017-02-10 RX ADMIN — LEVALBUTEROL HYDROCHLORIDE 1.25 MG: 1.25 SOLUTION, CONCENTRATE RESPIRATORY (INHALATION) at 00:49

## 2017-02-10 RX ADMIN — METHYLPREDNISOLONE SODIUM SUCCINATE 40 MG: 40 INJECTION, POWDER, FOR SOLUTION INTRAMUSCULAR; INTRAVENOUS at 22:00

## 2017-02-10 RX ADMIN — ISODIUM CHLORIDE 3 ML: 0.03 SOLUTION RESPIRATORY (INHALATION) at 00:50

## 2017-02-10 RX ADMIN — LORAZEPAM 0.5 MG: 0.5 TABLET ORAL at 09:06

## 2017-02-10 RX ADMIN — GUAIFENESIN 600 MG: 600 TABLET, EXTENDED RELEASE ORAL at 18:48

## 2017-02-10 RX ADMIN — HEPARIN SODIUM 5000 UNITS: 5000 INJECTION, SOLUTION INTRAVENOUS; SUBCUTANEOUS at 13:51

## 2017-02-10 RX ADMIN — HEPARIN SODIUM 5000 UNITS: 5000 INJECTION, SOLUTION INTRAVENOUS; SUBCUTANEOUS at 22:02

## 2017-02-10 RX ADMIN — METOPROLOL SUCCINATE 75 MG: 50 TABLET, FILM COATED, EXTENDED RELEASE ORAL at 09:06

## 2017-02-10 RX ADMIN — NYSTATIN 1 APPLICATION: 100000 POWDER TOPICAL at 09:06

## 2017-02-10 RX ADMIN — QUETIAPINE FUMARATE 25 MG: 25 TABLET, FILM COATED ORAL at 09:00

## 2017-02-10 RX ADMIN — POLYVINYL ALCOHOL 1 DROP: 14 SOLUTION/ DROPS OPHTHALMIC at 09:07

## 2017-02-10 RX ADMIN — ISODIUM CHLORIDE 3 ML: 0.03 SOLUTION RESPIRATORY (INHALATION) at 14:05

## 2017-02-10 RX ADMIN — BUSPIRONE HYDROCHLORIDE 15 MG: 5 TABLET ORAL at 16:24

## 2017-02-10 RX ADMIN — LEVOTHYROXINE SODIUM 50 MCG: 50 TABLET ORAL at 06:32

## 2017-02-10 RX ADMIN — VITAMIN D, TAB 1000IU (100/BT) 2000 UNITS: 25 TAB at 09:06

## 2017-02-10 RX ADMIN — QUETIAPINE FUMARATE 50 MG: 25 TABLET, FILM COATED ORAL at 22:03

## 2017-02-10 RX ADMIN — METRONIDAZOLE 500 MG: 500 TABLET ORAL at 13:51

## 2017-02-10 RX ADMIN — LEVALBUTEROL HYDROCHLORIDE 1.25 MG: 1.25 SOLUTION, CONCENTRATE RESPIRATORY (INHALATION) at 08:19

## 2017-02-10 RX ADMIN — LORAZEPAM 0.5 MG: 0.5 TABLET ORAL at 16:24

## 2017-02-10 RX ADMIN — QUETIAPINE FUMARATE 100 MG: 100 TABLET ORAL at 22:01

## 2017-02-10 RX ADMIN — BUSPIRONE HYDROCHLORIDE 15 MG: 5 TABLET ORAL at 22:00

## 2017-02-11 LAB
ANION GAP SERPL CALCULATED.3IONS-SCNC: 9 MMOL/L (ref 4–13)
BASOPHILS # BLD MANUAL: 0 THOUSAND/UL (ref 0–0.1)
BASOPHILS NFR MAR MANUAL: 0 % (ref 0–1)
BUN SERPL-MCNC: 58 MG/DL (ref 5–25)
CALCIUM SERPL-MCNC: 9.3 MG/DL (ref 8.3–10.1)
CHLORIDE SERPL-SCNC: 104 MMOL/L (ref 100–108)
CO2 SERPL-SCNC: 29 MMOL/L (ref 21–32)
CREAT SERPL-MCNC: 1.72 MG/DL (ref 0.6–1.3)
EOSINOPHIL # BLD MANUAL: 0 THOUSAND/UL (ref 0–0.4)
EOSINOPHIL NFR BLD MANUAL: 0 % (ref 0–6)
ERYTHROCYTE [DISTWIDTH] IN BLOOD BY AUTOMATED COUNT: 14.2 % (ref 11.6–15.1)
GFR SERPL CREATININE-BSD FRML MDRD: 28.2 ML/MIN/1.73SQ M
GLUCOSE SERPL-MCNC: 103 MG/DL (ref 65–140)
GLUCOSE SERPL-MCNC: 114 MG/DL (ref 65–140)
GLUCOSE SERPL-MCNC: 125 MG/DL (ref 65–140)
GLUCOSE SERPL-MCNC: 148 MG/DL (ref 65–140)
HCT VFR BLD AUTO: 38.9 % (ref 34.8–46.1)
HGB BLD-MCNC: 11.9 G/DL (ref 11.5–15.4)
LYMPHOCYTES # BLD AUTO: 0.61 THOUSAND/UL (ref 0.6–4.47)
LYMPHOCYTES # BLD AUTO: 5 % (ref 14–44)
MCH RBC QN AUTO: 28.2 PG (ref 26.8–34.3)
MCHC RBC AUTO-ENTMCNC: 30.6 G/DL (ref 31.4–37.4)
MCV RBC AUTO: 92 FL (ref 82–98)
METAMYELOCYTES NFR BLD MANUAL: 1 % (ref 0–1)
MONOCYTES # BLD AUTO: 0.24 THOUSAND/UL (ref 0–1.22)
MONOCYTES NFR BLD: 2 % (ref 4–12)
NEUTROPHILS # BLD MANUAL: 11.21 THOUSAND/UL (ref 1.85–7.62)
NEUTS BAND NFR BLD MANUAL: 2 % (ref 0–8)
NEUTS SEG NFR BLD AUTO: 90 % (ref 43–75)
PLATELET # BLD AUTO: 258 THOUSANDS/UL (ref 149–390)
PLATELET BLD QL SMEAR: ADEQUATE
PMV BLD AUTO: 10.7 FL (ref 8.9–12.7)
POTASSIUM SERPL-SCNC: 5.2 MMOL/L (ref 3.5–5.3)
RBC # BLD AUTO: 4.22 MILLION/UL (ref 3.81–5.12)
SODIUM SERPL-SCNC: 142 MMOL/L (ref 136–145)
TOTAL CELLS COUNTED SPEC: 100
WBC # BLD AUTO: 12.18 THOUSAND/UL (ref 4.31–10.16)

## 2017-02-11 PROCEDURE — 92610 EVALUATE SWALLOWING FUNCTION: CPT

## 2017-02-11 PROCEDURE — 94640 AIRWAY INHALATION TREATMENT: CPT

## 2017-02-11 PROCEDURE — 82948 REAGENT STRIP/BLOOD GLUCOSE: CPT

## 2017-02-11 PROCEDURE — 94760 N-INVAS EAR/PLS OXIMETRY 1: CPT

## 2017-02-11 PROCEDURE — 80048 BASIC METABOLIC PNL TOTAL CA: CPT | Performed by: FAMILY MEDICINE

## 2017-02-11 PROCEDURE — 85007 BL SMEAR W/DIFF WBC COUNT: CPT | Performed by: FAMILY MEDICINE

## 2017-02-11 PROCEDURE — 85027 COMPLETE CBC AUTOMATED: CPT | Performed by: FAMILY MEDICINE

## 2017-02-11 PROCEDURE — 94668 MNPJ CHEST WALL SBSQ: CPT

## 2017-02-11 RX ORDER — HYDRALAZINE HYDROCHLORIDE 20 MG/ML
5 INJECTION INTRAMUSCULAR; INTRAVENOUS EVERY 6 HOURS PRN
Status: DISCONTINUED | OUTPATIENT
Start: 2017-02-11 | End: 2017-02-13 | Stop reason: HOSPADM

## 2017-02-11 RX ORDER — METHYLPREDNISOLONE SODIUM SUCCINATE 40 MG/ML
40 INJECTION, POWDER, LYOPHILIZED, FOR SOLUTION INTRAMUSCULAR; INTRAVENOUS DAILY
Status: DISCONTINUED | OUTPATIENT
Start: 2017-02-12 | End: 2017-02-12

## 2017-02-11 RX ORDER — LABETALOL HYDROCHLORIDE 5 MG/ML
10 INJECTION, SOLUTION INTRAVENOUS EVERY 6 HOURS PRN
Status: DISCONTINUED | OUTPATIENT
Start: 2017-02-11 | End: 2017-02-13 | Stop reason: HOSPADM

## 2017-02-11 RX ORDER — SODIUM CHLORIDE 9 MG/ML
100 INJECTION, SOLUTION INTRAVENOUS CONTINUOUS
Status: DISCONTINUED | OUTPATIENT
Start: 2017-02-11 | End: 2017-02-13

## 2017-02-11 RX ADMIN — POLYVINYL ALCOHOL 1 DROP: 14 SOLUTION/ DROPS OPHTHALMIC at 17:13

## 2017-02-11 RX ADMIN — LORAZEPAM 0.5 MG: 0.5 TABLET ORAL at 15:17

## 2017-02-11 RX ADMIN — QUETIAPINE FUMARATE 100 MG: 100 TABLET ORAL at 21:51

## 2017-02-11 RX ADMIN — GUAIFENESIN 600 MG: 600 TABLET, EXTENDED RELEASE ORAL at 09:14

## 2017-02-11 RX ADMIN — METHYLPREDNISOLONE SODIUM SUCCINATE 40 MG: 40 INJECTION, POWDER, FOR SOLUTION INTRAMUSCULAR; INTRAVENOUS at 09:14

## 2017-02-11 RX ADMIN — POLYVINYL ALCOHOL 1 DROP: 14 SOLUTION/ DROPS OPHTHALMIC at 09:14

## 2017-02-11 RX ADMIN — CITALOPRAM HYDROBROMIDE 40 MG: 20 TABLET ORAL at 09:14

## 2017-02-11 RX ADMIN — HEPARIN SODIUM 5000 UNITS: 5000 INJECTION, SOLUTION INTRAVENOUS; SUBCUTANEOUS at 05:18

## 2017-02-11 RX ADMIN — TORSEMIDE 20 MG: 20 TABLET ORAL at 09:13

## 2017-02-11 RX ADMIN — HEPARIN SODIUM 5000 UNITS: 5000 INJECTION, SOLUTION INTRAVENOUS; SUBCUTANEOUS at 21:51

## 2017-02-11 RX ADMIN — QUETIAPINE FUMARATE 25 MG: 25 TABLET, FILM COATED ORAL at 09:14

## 2017-02-11 RX ADMIN — BUSPIRONE HYDROCHLORIDE 15 MG: 5 TABLET ORAL at 21:50

## 2017-02-11 RX ADMIN — HEPARIN SODIUM 5000 UNITS: 5000 INJECTION, SOLUTION INTRAVENOUS; SUBCUTANEOUS at 15:17

## 2017-02-11 RX ADMIN — POLYVINYL ALCOHOL 1 DROP: 14 SOLUTION/ DROPS OPHTHALMIC at 21:52

## 2017-02-11 RX ADMIN — LEVALBUTEROL HYDROCHLORIDE 1.25 MG: 1.25 SOLUTION, CONCENTRATE RESPIRATORY (INHALATION) at 13:26

## 2017-02-11 RX ADMIN — PANTOPRAZOLE SODIUM 40 MG: 40 TABLET, DELAYED RELEASE ORAL at 05:18

## 2017-02-11 RX ADMIN — POLYVINYL ALCOHOL 1 DROP: 14 SOLUTION/ DROPS OPHTHALMIC at 15:17

## 2017-02-11 RX ADMIN — GUAIFENESIN 600 MG: 600 TABLET, EXTENDED RELEASE ORAL at 17:13

## 2017-02-11 RX ADMIN — LEVOTHYROXINE SODIUM 50 MCG: 50 TABLET ORAL at 05:18

## 2017-02-11 RX ADMIN — SODIUM CHLORIDE 75 ML/HR: 0.9 INJECTION, SOLUTION INTRAVENOUS at 17:13

## 2017-02-11 RX ADMIN — HYDROCHLOROTHIAZIDE: 25 TABLET ORAL at 09:13

## 2017-02-11 RX ADMIN — BUSPIRONE HYDROCHLORIDE 15 MG: 5 TABLET ORAL at 15:17

## 2017-02-11 RX ADMIN — LEVALBUTEROL HYDROCHLORIDE 1.25 MG: 1.25 SOLUTION, CONCENTRATE RESPIRATORY (INHALATION) at 01:18

## 2017-02-11 RX ADMIN — LORAZEPAM 0.5 MG: 0.5 TABLET ORAL at 21:51

## 2017-02-11 RX ADMIN — QUETIAPINE FUMARATE 50 MG: 25 TABLET, FILM COATED ORAL at 21:51

## 2017-02-11 RX ADMIN — ISODIUM CHLORIDE 3 ML: 0.03 SOLUTION RESPIRATORY (INHALATION) at 18:11

## 2017-02-11 RX ADMIN — LORAZEPAM 0.5 MG: 0.5 TABLET ORAL at 09:13

## 2017-02-11 RX ADMIN — LEVALBUTEROL HYDROCHLORIDE 1.25 MG: 1.25 SOLUTION, CONCENTRATE RESPIRATORY (INHALATION) at 18:11

## 2017-02-11 RX ADMIN — VITAMIN D, TAB 1000IU (100/BT) 2000 UNITS: 25 TAB at 09:14

## 2017-02-11 RX ADMIN — ISODIUM CHLORIDE 3 ML: 0.03 SOLUTION RESPIRATORY (INHALATION) at 13:26

## 2017-02-11 RX ADMIN — ISODIUM CHLORIDE 3 ML: 0.03 SOLUTION RESPIRATORY (INHALATION) at 01:18

## 2017-02-11 RX ADMIN — LABETALOL HYDROCHLORIDE 10 MG: 5 INJECTION, SOLUTION INTRAVENOUS at 17:13

## 2017-02-11 RX ADMIN — METOPROLOL SUCCINATE 75 MG: 50 TABLET, FILM COATED, EXTENDED RELEASE ORAL at 09:14

## 2017-02-11 RX ADMIN — ISODIUM CHLORIDE 3 ML: 0.03 SOLUTION RESPIRATORY (INHALATION) at 08:18

## 2017-02-11 RX ADMIN — LEVALBUTEROL HYDROCHLORIDE 1.25 MG: 1.25 SOLUTION, CONCENTRATE RESPIRATORY (INHALATION) at 08:17

## 2017-02-11 RX ADMIN — BUSPIRONE HYDROCHLORIDE 15 MG: 5 TABLET ORAL at 09:20

## 2017-02-12 LAB
ALBUMIN SERPL BCP-MCNC: 3.1 G/DL (ref 3.5–5)
ALP SERPL-CCNC: 32 U/L (ref 46–116)
ALT SERPL W P-5'-P-CCNC: 22 U/L (ref 12–78)
ANION GAP SERPL CALCULATED.3IONS-SCNC: 9 MMOL/L (ref 4–13)
AST SERPL W P-5'-P-CCNC: 16 U/L (ref 5–45)
BASOPHILS # BLD MANUAL: 0 THOUSAND/UL (ref 0–0.1)
BASOPHILS NFR MAR MANUAL: 0 % (ref 0–1)
BILIRUB SERPL-MCNC: 0.4 MG/DL (ref 0.2–1)
BUN SERPL-MCNC: 64 MG/DL (ref 5–25)
CALCIUM SERPL-MCNC: 8.4 MG/DL (ref 8.3–10.1)
CHLORIDE SERPL-SCNC: 104 MMOL/L (ref 100–108)
CO2 SERPL-SCNC: 31 MMOL/L (ref 21–32)
CREAT SERPL-MCNC: 1.96 MG/DL (ref 0.6–1.3)
EOSINOPHIL # BLD MANUAL: 0 THOUSAND/UL (ref 0–0.4)
EOSINOPHIL NFR BLD MANUAL: 0 % (ref 0–6)
ERYTHROCYTE [DISTWIDTH] IN BLOOD BY AUTOMATED COUNT: 14.3 % (ref 11.6–15.1)
GFR SERPL CREATININE-BSD FRML MDRD: 24.2 ML/MIN/1.73SQ M
GLUCOSE SERPL-MCNC: 86 MG/DL (ref 65–140)
HCT VFR BLD AUTO: 35.7 % (ref 34.8–46.1)
HGB BLD-MCNC: 10.7 G/DL (ref 11.5–15.4)
LYMPHOCYTES # BLD AUTO: 0.93 THOUSAND/UL (ref 0.6–4.47)
LYMPHOCYTES # BLD AUTO: 11 % (ref 14–44)
MCH RBC QN AUTO: 27.6 PG (ref 26.8–34.3)
MCHC RBC AUTO-ENTMCNC: 30 G/DL (ref 31.4–37.4)
MCV RBC AUTO: 92 FL (ref 82–98)
METAMYELOCYTES NFR BLD MANUAL: 1 % (ref 0–1)
MONOCYTES # BLD AUTO: 0.67 THOUSAND/UL (ref 0–1.22)
MONOCYTES NFR BLD: 8 % (ref 4–12)
MYELOCYTES NFR BLD MANUAL: 1 % (ref 0–1)
NEUTROPHILS # BLD MANUAL: 6.66 THOUSAND/UL (ref 1.85–7.62)
NEUTS SEG NFR BLD AUTO: 79 % (ref 43–75)
PLATELET # BLD AUTO: 220 THOUSANDS/UL (ref 149–390)
PLATELET BLD QL SMEAR: ADEQUATE
PMV BLD AUTO: 10.3 FL (ref 8.9–12.7)
POTASSIUM SERPL-SCNC: 4.2 MMOL/L (ref 3.5–5.3)
PROT SERPL-MCNC: 6.2 G/DL (ref 6.4–8.2)
RBC # BLD AUTO: 3.88 MILLION/UL (ref 3.81–5.12)
SODIUM SERPL-SCNC: 144 MMOL/L (ref 136–145)
TOTAL CELLS COUNTED SPEC: 100
WBC # BLD AUTO: 8.43 THOUSAND/UL (ref 4.31–10.16)

## 2017-02-12 PROCEDURE — 85007 BL SMEAR W/DIFF WBC COUNT: CPT | Performed by: INTERNAL MEDICINE

## 2017-02-12 PROCEDURE — 94760 N-INVAS EAR/PLS OXIMETRY 1: CPT

## 2017-02-12 PROCEDURE — 80053 COMPREHEN METABOLIC PANEL: CPT | Performed by: INTERNAL MEDICINE

## 2017-02-12 PROCEDURE — 85027 COMPLETE CBC AUTOMATED: CPT | Performed by: INTERNAL MEDICINE

## 2017-02-12 PROCEDURE — 94640 AIRWAY INHALATION TREATMENT: CPT

## 2017-02-12 RX ORDER — METHYLPREDNISOLONE SODIUM SUCCINATE 40 MG/ML
40 INJECTION, POWDER, LYOPHILIZED, FOR SOLUTION INTRAMUSCULAR; INTRAVENOUS EVERY 12 HOURS SCHEDULED
Status: DISCONTINUED | OUTPATIENT
Start: 2017-02-12 | End: 2017-02-13 | Stop reason: HOSPADM

## 2017-02-12 RX ADMIN — ISODIUM CHLORIDE 3 ML: 0.03 SOLUTION RESPIRATORY (INHALATION) at 07:20

## 2017-02-12 RX ADMIN — LORAZEPAM 0.5 MG: 0.5 TABLET ORAL at 21:58

## 2017-02-12 RX ADMIN — GUAIFENESIN 600 MG: 600 TABLET, EXTENDED RELEASE ORAL at 08:56

## 2017-02-12 RX ADMIN — SODIUM CHLORIDE 100 ML/HR: 0.9 INJECTION, SOLUTION INTRAVENOUS at 18:12

## 2017-02-12 RX ADMIN — METHYLPREDNISOLONE SODIUM SUCCINATE 40 MG: 40 INJECTION, POWDER, FOR SOLUTION INTRAMUSCULAR; INTRAVENOUS at 08:58

## 2017-02-12 RX ADMIN — PANTOPRAZOLE SODIUM 40 MG: 40 TABLET, DELAYED RELEASE ORAL at 05:42

## 2017-02-12 RX ADMIN — BUSPIRONE HYDROCHLORIDE 15 MG: 5 TABLET ORAL at 08:56

## 2017-02-12 RX ADMIN — HEPARIN SODIUM 5000 UNITS: 5000 INJECTION, SOLUTION INTRAVENOUS; SUBCUTANEOUS at 05:42

## 2017-02-12 RX ADMIN — QUETIAPINE FUMARATE 50 MG: 25 TABLET, FILM COATED ORAL at 21:58

## 2017-02-12 RX ADMIN — HEPARIN SODIUM 5000 UNITS: 5000 INJECTION, SOLUTION INTRAVENOUS; SUBCUTANEOUS at 21:58

## 2017-02-12 RX ADMIN — LEVALBUTEROL HYDROCHLORIDE 1.25 MG: 1.25 SOLUTION, CONCENTRATE RESPIRATORY (INHALATION) at 07:20

## 2017-02-12 RX ADMIN — METOPROLOL SUCCINATE 75 MG: 50 TABLET, FILM COATED, EXTENDED RELEASE ORAL at 08:56

## 2017-02-12 RX ADMIN — NYSTATIN 1 APPLICATION: 100000 POWDER TOPICAL at 21:59

## 2017-02-12 RX ADMIN — LORAZEPAM 0.5 MG: 0.5 TABLET ORAL at 18:13

## 2017-02-12 RX ADMIN — ISODIUM CHLORIDE 3 ML: 0.03 SOLUTION RESPIRATORY (INHALATION) at 13:26

## 2017-02-12 RX ADMIN — VITAMIN D, TAB 1000IU (100/BT) 2000 UNITS: 25 TAB at 08:58

## 2017-02-12 RX ADMIN — LEVALBUTEROL HYDROCHLORIDE 1.25 MG: 1.25 SOLUTION, CONCENTRATE RESPIRATORY (INHALATION) at 21:39

## 2017-02-12 RX ADMIN — NYSTATIN 1 APPLICATION: 100000 POWDER TOPICAL at 18:13

## 2017-02-12 RX ADMIN — BUSPIRONE HYDROCHLORIDE 15 MG: 5 TABLET ORAL at 18:13

## 2017-02-12 RX ADMIN — LORAZEPAM 0.5 MG: 0.5 TABLET ORAL at 08:58

## 2017-02-12 RX ADMIN — HYDRALAZINE HYDROCHLORIDE 5 MG: 20 INJECTION INTRAMUSCULAR; INTRAVENOUS at 09:12

## 2017-02-12 RX ADMIN — BUSPIRONE HYDROCHLORIDE 15 MG: 5 TABLET ORAL at 21:58

## 2017-02-12 RX ADMIN — NYSTATIN 1 APPLICATION: 100000 POWDER TOPICAL at 14:45

## 2017-02-12 RX ADMIN — POLYVINYL ALCOHOL 1 DROP: 14 SOLUTION/ DROPS OPHTHALMIC at 18:14

## 2017-02-12 RX ADMIN — POLYVINYL ALCOHOL 1 DROP: 14 SOLUTION/ DROPS OPHTHALMIC at 21:58

## 2017-02-12 RX ADMIN — GUAIFENESIN 600 MG: 600 TABLET, EXTENDED RELEASE ORAL at 18:12

## 2017-02-12 RX ADMIN — QUETIAPINE FUMARATE 100 MG: 100 TABLET ORAL at 21:58

## 2017-02-12 RX ADMIN — CITALOPRAM HYDROBROMIDE 40 MG: 20 TABLET ORAL at 08:58

## 2017-02-12 RX ADMIN — LEVOTHYROXINE SODIUM 50 MCG: 50 TABLET ORAL at 05:42

## 2017-02-12 RX ADMIN — METHYLPREDNISOLONE SODIUM SUCCINATE 40 MG: 40 INJECTION, POWDER, FOR SOLUTION INTRAMUSCULAR; INTRAVENOUS at 21:57

## 2017-02-12 RX ADMIN — PANTOPRAZOLE SODIUM 40 MG: 40 TABLET, DELAYED RELEASE ORAL at 08:56

## 2017-02-12 RX ADMIN — LEVALBUTEROL HYDROCHLORIDE 1.25 MG: 1.25 SOLUTION, CONCENTRATE RESPIRATORY (INHALATION) at 13:26

## 2017-02-12 RX ADMIN — QUETIAPINE FUMARATE 25 MG: 25 TABLET, FILM COATED ORAL at 08:58

## 2017-02-12 RX ADMIN — ISODIUM CHLORIDE 3 ML: 0.03 SOLUTION RESPIRATORY (INHALATION) at 21:38

## 2017-02-13 ENCOUNTER — APPOINTMENT (INPATIENT)
Dept: ULTRASOUND IMAGING | Facility: HOSPITAL | Age: 82
DRG: 177 | End: 2017-02-13
Payer: MEDICARE

## 2017-02-13 VITALS
HEART RATE: 78 BPM | RESPIRATION RATE: 16 BRPM | DIASTOLIC BLOOD PRESSURE: 80 MMHG | HEIGHT: 64 IN | TEMPERATURE: 98 F | SYSTOLIC BLOOD PRESSURE: 173 MMHG | BODY MASS INDEX: 46.41 KG/M2 | OXYGEN SATURATION: 95 % | WEIGHT: 271.83 LBS

## 2017-02-13 PROBLEM — D72.829 LEUKOCYTOSIS: Status: RESOLVED | Noted: 2017-02-05 | Resolved: 2017-02-13

## 2017-02-13 PROBLEM — J18.9 CAP (COMMUNITY ACQUIRED PNEUMONIA): Status: RESOLVED | Noted: 2017-02-05 | Resolved: 2017-02-13

## 2017-02-13 LAB
ANION GAP SERPL CALCULATED.3IONS-SCNC: 8 MMOL/L (ref 4–13)
BASOPHILS # BLD MANUAL: 0 THOUSAND/UL (ref 0–0.1)
BASOPHILS NFR MAR MANUAL: 0 % (ref 0–1)
BUN SERPL-MCNC: 55 MG/DL (ref 5–25)
CALCIUM SERPL-MCNC: 8.7 MG/DL (ref 8.3–10.1)
CHLORIDE SERPL-SCNC: 106 MMOL/L (ref 100–108)
CO2 SERPL-SCNC: 29 MMOL/L (ref 21–32)
CREAT SERPL-MCNC: 1.6 MG/DL (ref 0.6–1.3)
EOSINOPHIL # BLD MANUAL: 0 THOUSAND/UL (ref 0–0.4)
EOSINOPHIL NFR BLD MANUAL: 0 % (ref 0–6)
ERYTHROCYTE [DISTWIDTH] IN BLOOD BY AUTOMATED COUNT: 14.3 % (ref 11.6–15.1)
GFR SERPL CREATININE-BSD FRML MDRD: 30.6 ML/MIN/1.73SQ M
GLUCOSE SERPL-MCNC: 105 MG/DL (ref 65–140)
GLUCOSE SERPL-MCNC: 79 MG/DL (ref 65–140)
HCT VFR BLD AUTO: 36.8 % (ref 34.8–46.1)
HGB BLD-MCNC: 11.2 G/DL (ref 11.5–15.4)
LYMPHOCYTES # BLD AUTO: 0.38 THOUSAND/UL (ref 0.6–4.47)
LYMPHOCYTES # BLD AUTO: 5 % (ref 14–44)
MCH RBC QN AUTO: 28.2 PG (ref 26.8–34.3)
MCHC RBC AUTO-ENTMCNC: 30.4 G/DL (ref 31.4–37.4)
MCV RBC AUTO: 93 FL (ref 82–98)
MONOCYTES # BLD AUTO: 0.45 THOUSAND/UL (ref 0–1.22)
MONOCYTES NFR BLD: 6 % (ref 4–12)
NEUTROPHILS # BLD MANUAL: 6.64 THOUSAND/UL (ref 1.85–7.62)
NEUTS BAND NFR BLD MANUAL: 1 % (ref 0–8)
NEUTS SEG NFR BLD AUTO: 87 % (ref 43–75)
OVALOCYTES BLD QL SMEAR: PRESENT
PLATELET # BLD AUTO: 234 THOUSANDS/UL (ref 149–390)
PLATELET BLD QL SMEAR: ADEQUATE
PMV BLD AUTO: 10.6 FL (ref 8.9–12.7)
POTASSIUM SERPL-SCNC: 4.7 MMOL/L (ref 3.5–5.3)
RBC # BLD AUTO: 3.97 MILLION/UL (ref 3.81–5.12)
RBC MORPH BLD: PRESENT
SODIUM SERPL-SCNC: 143 MMOL/L (ref 136–145)
TOTAL CELLS COUNTED SPEC: 100
VARIANT LYMPHS # BLD AUTO: 1 %
WBC # BLD AUTO: 7.54 THOUSAND/UL (ref 4.31–10.16)

## 2017-02-13 PROCEDURE — 97110 THERAPEUTIC EXERCISES: CPT

## 2017-02-13 PROCEDURE — 85007 BL SMEAR W/DIFF WBC COUNT: CPT | Performed by: INTERNAL MEDICINE

## 2017-02-13 PROCEDURE — 80048 BASIC METABOLIC PNL TOTAL CA: CPT | Performed by: INTERNAL MEDICINE

## 2017-02-13 PROCEDURE — 85027 COMPLETE CBC AUTOMATED: CPT | Performed by: INTERNAL MEDICINE

## 2017-02-13 PROCEDURE — 76770 US EXAM ABDO BACK WALL COMP: CPT

## 2017-02-13 PROCEDURE — 82948 REAGENT STRIP/BLOOD GLUCOSE: CPT

## 2017-02-13 PROCEDURE — 97530 THERAPEUTIC ACTIVITIES: CPT

## 2017-02-13 PROCEDURE — 97116 GAIT TRAINING THERAPY: CPT

## 2017-02-13 PROCEDURE — 94760 N-INVAS EAR/PLS OXIMETRY 1: CPT

## 2017-02-13 PROCEDURE — 94640 AIRWAY INHALATION TREATMENT: CPT

## 2017-02-13 RX ORDER — TORSEMIDE 20 MG/1
20 TABLET ORAL DAILY
Qty: 30 TABLET | Refills: 0 | Status: SHIPPED | OUTPATIENT
Start: 2017-02-14 | End: 2017-08-18 | Stop reason: HOSPADM

## 2017-02-13 RX ORDER — PREDNISONE 10 MG/1
TABLET ORAL
Qty: 30 TABLET | Refills: 0
Start: 2017-02-13 | End: 2017-03-02 | Stop reason: ALTCHOICE

## 2017-02-13 RX ORDER — LOSARTAN POTASSIUM 100 MG/1
100 TABLET ORAL DAILY
Qty: 30 TABLET | Refills: 0 | Status: SHIPPED | OUTPATIENT
Start: 2017-02-13 | End: 2017-03-02

## 2017-02-13 RX ADMIN — PANTOPRAZOLE SODIUM 40 MG: 40 TABLET, DELAYED RELEASE ORAL at 05:47

## 2017-02-13 RX ADMIN — Medication 5 DROP: at 09:22

## 2017-02-13 RX ADMIN — LEVOTHYROXINE SODIUM 50 MCG: 50 TABLET ORAL at 05:47

## 2017-02-13 RX ADMIN — HEPARIN SODIUM 5000 UNITS: 5000 INJECTION, SOLUTION INTRAVENOUS; SUBCUTANEOUS at 05:47

## 2017-02-13 RX ADMIN — CITALOPRAM HYDROBROMIDE 40 MG: 20 TABLET ORAL at 09:20

## 2017-02-13 RX ADMIN — POLYVINYL ALCOHOL 1 DROP: 14 SOLUTION/ DROPS OPHTHALMIC at 13:59

## 2017-02-13 RX ADMIN — QUETIAPINE FUMARATE 25 MG: 25 TABLET, FILM COATED ORAL at 09:20

## 2017-02-13 RX ADMIN — LEVALBUTEROL HYDROCHLORIDE 1.25 MG: 1.25 SOLUTION, CONCENTRATE RESPIRATORY (INHALATION) at 07:47

## 2017-02-13 RX ADMIN — ISODIUM CHLORIDE 3 ML: 0.03 SOLUTION RESPIRATORY (INHALATION) at 07:47

## 2017-02-13 RX ADMIN — POLYVINYL ALCOHOL 1 DROP: 14 SOLUTION/ DROPS OPHTHALMIC at 09:22

## 2017-02-13 RX ADMIN — LORAZEPAM 0.5 MG: 0.5 TABLET ORAL at 09:20

## 2017-02-13 RX ADMIN — VITAMIN D, TAB 1000IU (100/BT) 2000 UNITS: 25 TAB at 09:20

## 2017-02-13 RX ADMIN — NYSTATIN 1 APPLICATION: 100000 POWDER TOPICAL at 09:22

## 2017-02-13 RX ADMIN — METHYLPREDNISOLONE SODIUM SUCCINATE 40 MG: 40 INJECTION, POWDER, FOR SOLUTION INTRAMUSCULAR; INTRAVENOUS at 09:20

## 2017-02-13 RX ADMIN — LEVALBUTEROL HYDROCHLORIDE 1.25 MG: 1.25 SOLUTION, CONCENTRATE RESPIRATORY (INHALATION) at 14:52

## 2017-02-13 RX ADMIN — PANTOPRAZOLE SODIUM 40 MG: 40 TABLET, DELAYED RELEASE ORAL at 09:20

## 2017-02-13 RX ADMIN — BUSPIRONE HYDROCHLORIDE 15 MG: 5 TABLET ORAL at 09:20

## 2017-02-13 RX ADMIN — HEPARIN SODIUM 5000 UNITS: 5000 INJECTION, SOLUTION INTRAVENOUS; SUBCUTANEOUS at 13:59

## 2017-02-13 RX ADMIN — ISODIUM CHLORIDE 3 ML: 0.03 SOLUTION RESPIRATORY (INHALATION) at 14:53

## 2017-02-13 RX ADMIN — METOPROLOL SUCCINATE 75 MG: 50 TABLET, FILM COATED, EXTENDED RELEASE ORAL at 09:19

## 2017-02-13 RX ADMIN — GUAIFENESIN 600 MG: 600 TABLET, EXTENDED RELEASE ORAL at 09:20

## 2017-03-02 ENCOUNTER — APPOINTMENT (EMERGENCY)
Dept: CT IMAGING | Facility: HOSPITAL | Age: 82
End: 2017-03-02
Payer: MEDICARE

## 2017-03-02 ENCOUNTER — HOSPITAL ENCOUNTER (EMERGENCY)
Facility: HOSPITAL | Age: 82
Discharge: NURSING, REHAB OR LONG-TERM CARE | End: 2017-03-02
Attending: EMERGENCY MEDICINE | Admitting: EMERGENCY MEDICINE
Payer: MEDICARE

## 2017-03-02 VITALS
OXYGEN SATURATION: 97 % | TEMPERATURE: 98 F | BODY MASS INDEX: 46.9 KG/M2 | RESPIRATION RATE: 20 BRPM | DIASTOLIC BLOOD PRESSURE: 65 MMHG | SYSTOLIC BLOOD PRESSURE: 167 MMHG | WEIGHT: 268.96 LBS | HEART RATE: 75 BPM

## 2017-03-02 DIAGNOSIS — S09.90XA MINOR HEAD INJURY WITHOUT LOSS OF CONSCIOUSNESS, INITIAL ENCOUNTER: Primary | ICD-10-CM

## 2017-03-02 PROCEDURE — 72125 CT NECK SPINE W/O DYE: CPT

## 2017-03-02 PROCEDURE — 70450 CT HEAD/BRAIN W/O DYE: CPT

## 2017-03-02 PROCEDURE — 99284 EMERGENCY DEPT VISIT MOD MDM: CPT

## 2017-03-02 RX ORDER — POLYVINYL ALCOHOL 14 MG/ML
1 SOLUTION/ DROPS OPHTHALMIC 4 TIMES DAILY
COMMUNITY
End: 2017-03-02 | Stop reason: ALTCHOICE

## 2017-03-02 RX ORDER — METOPROLOL TARTRATE 50 MG/1
50 TABLET, FILM COATED ORAL 2 TIMES DAILY
COMMUNITY
End: 2017-06-27 | Stop reason: ALTCHOICE

## 2017-03-02 RX ORDER — OSELTAMIVIR PHOSPHATE 30 MG/1
30 CAPSULE ORAL EVERY 12 HOURS SCHEDULED
COMMUNITY
End: 2017-06-27 | Stop reason: ALTCHOICE

## 2017-03-02 RX ORDER — QUETIAPINE FUMARATE 50 MG/1
25 TABLET, FILM COATED ORAL
COMMUNITY
End: 2017-06-27 | Stop reason: SDUPTHER

## 2017-03-02 RX ORDER — LORATADINE 10 MG/1
10 TABLET ORAL DAILY
COMMUNITY
End: 2019-04-11 | Stop reason: ALTCHOICE

## 2017-03-02 RX ORDER — LOSARTAN POTASSIUM AND HYDROCHLOROTHIAZIDE 25; 100 MG/1; MG/1
1 TABLET ORAL DAILY
COMMUNITY
End: 2017-08-18 | Stop reason: HOSPADM

## 2017-04-11 ENCOUNTER — ALLSCRIPTS OFFICE VISIT (OUTPATIENT)
Dept: OTHER | Facility: OTHER | Age: 82
End: 2017-04-11

## 2017-04-11 DIAGNOSIS — J18.9 PNEUMONIA: ICD-10-CM

## 2017-04-11 DIAGNOSIS — J98.01 ACUTE BRONCHOSPASM: ICD-10-CM

## 2017-04-17 ENCOUNTER — TRANSCRIBE ORDERS (OUTPATIENT)
Dept: LAB | Facility: CLINIC | Age: 82
End: 2017-04-17

## 2017-04-17 ENCOUNTER — HOSPITAL ENCOUNTER (OUTPATIENT)
Dept: PULMONOLOGY | Facility: HOSPITAL | Age: 82
Discharge: HOME | End: 2017-04-17
Payer: MEDICARE

## 2017-04-17 ENCOUNTER — GENERIC CONVERSION - ENCOUNTER (OUTPATIENT)
Dept: OTHER | Facility: OTHER | Age: 82
End: 2017-04-17

## 2017-04-17 DIAGNOSIS — J44.1 COPD EXACERBATION (HCC): ICD-10-CM

## 2017-04-17 PROCEDURE — 94729 DIFFUSING CAPACITY: CPT

## 2017-04-17 PROCEDURE — 94760 N-INVAS EAR/PLS OXIMETRY 1: CPT

## 2017-04-17 PROCEDURE — 94726 PLETHYSMOGRAPHY LUNG VOLUMES: CPT

## 2017-04-17 PROCEDURE — 94060 EVALUATION OF WHEEZING: CPT

## 2017-04-21 ENCOUNTER — TRANSCRIBE ORDERS (OUTPATIENT)
Dept: ADMINISTRATIVE | Facility: HOSPITAL | Age: 82
End: 2017-04-21

## 2017-04-21 ENCOUNTER — HOSPITAL ENCOUNTER (OUTPATIENT)
Dept: CT IMAGING | Facility: HOSPITAL | Age: 82
Discharge: HOME | End: 2017-04-21
Payer: MEDICARE

## 2017-04-21 DIAGNOSIS — J18.9 PNEUMONIA: ICD-10-CM

## 2017-04-21 DIAGNOSIS — J98.01 ACUTE BRONCHOSPASM: ICD-10-CM

## 2017-04-21 PROCEDURE — 71250 CT THORAX DX C-: CPT

## 2017-05-25 ENCOUNTER — APPOINTMENT (EMERGENCY)
Dept: RADIOLOGY | Facility: HOSPITAL | Age: 82
End: 2017-05-25
Payer: MEDICARE

## 2017-05-25 ENCOUNTER — HOSPITAL ENCOUNTER (EMERGENCY)
Facility: HOSPITAL | Age: 82
Discharge: HOME | End: 2017-05-26
Attending: EMERGENCY MEDICINE | Admitting: EMERGENCY MEDICINE
Payer: MEDICARE

## 2017-05-25 ENCOUNTER — APPOINTMENT (EMERGENCY)
Dept: CT IMAGING | Facility: HOSPITAL | Age: 82
End: 2017-05-25
Payer: MEDICARE

## 2017-05-25 DIAGNOSIS — R10.31 RIGHT LOWER QUADRANT ABDOMINAL PAIN: Primary | ICD-10-CM

## 2017-05-25 DIAGNOSIS — N28.1 RENAL CYST, LEFT: ICD-10-CM

## 2017-05-25 DIAGNOSIS — K44.9 HIATAL HERNIA: ICD-10-CM

## 2017-05-25 DIAGNOSIS — N28.9 RENAL LESION: ICD-10-CM

## 2017-05-25 LAB
ALBUMIN SERPL BCP-MCNC: 3.4 G/DL (ref 3.5–5)
ALP SERPL-CCNC: 60 U/L (ref 46–116)
ALT SERPL W P-5'-P-CCNC: 18 U/L (ref 12–78)
ANION GAP SERPL CALCULATED.3IONS-SCNC: 10 MMOL/L (ref 4–13)
APTT PPP: 33 SECONDS (ref 23–35)
AST SERPL W P-5'-P-CCNC: 21 U/L (ref 5–45)
BASOPHILS # BLD AUTO: 0.01 THOUSANDS/ΜL (ref 0–0.1)
BASOPHILS NFR BLD AUTO: 0 % (ref 0–1)
BILIRUB SERPL-MCNC: 0.3 MG/DL (ref 0.2–1)
BILIRUB UR QL STRIP: NEGATIVE
BUN SERPL-MCNC: 56 MG/DL (ref 5–25)
CALCIUM SERPL-MCNC: 8.9 MG/DL (ref 8.3–10.1)
CHLORIDE SERPL-SCNC: 100 MMOL/L (ref 100–108)
CLARITY UR: CLEAR
CO2 SERPL-SCNC: 32 MMOL/L (ref 21–32)
COLOR UR: YELLOW
CREAT SERPL-MCNC: 2.92 MG/DL (ref 0.6–1.3)
EOSINOPHIL # BLD AUTO: 0 THOUSAND/ΜL (ref 0–0.61)
EOSINOPHIL NFR BLD AUTO: 0 % (ref 0–6)
ERYTHROCYTE [DISTWIDTH] IN BLOOD BY AUTOMATED COUNT: 13.8 % (ref 11.6–15.1)
GFR SERPL CREATININE-BSD FRML MDRD: 15.3 ML/MIN/1.73SQ M
GLUCOSE SERPL-MCNC: 139 MG/DL (ref 65–140)
GLUCOSE UR STRIP-MCNC: NEGATIVE MG/DL
HCT VFR BLD AUTO: 33.2 % (ref 34.8–46.1)
HGB BLD-MCNC: 10.4 G/DL (ref 11.5–15.4)
HGB UR QL STRIP.AUTO: NEGATIVE
INR PPP: 1.05 (ref 0.86–1.16)
KETONES UR STRIP-MCNC: NEGATIVE MG/DL
LACTATE SERPL-SCNC: 1.8 MMOL/L (ref 0.5–2)
LEUKOCYTE ESTERASE UR QL STRIP: NEGATIVE
LYMPHOCYTES # BLD AUTO: 1.8 THOUSANDS/ΜL (ref 0.6–4.47)
LYMPHOCYTES NFR BLD AUTO: 23 % (ref 14–44)
MCH RBC QN AUTO: 28.7 PG (ref 26.8–34.3)
MCHC RBC AUTO-ENTMCNC: 31.3 G/DL (ref 31.4–37.4)
MCV RBC AUTO: 92 FL (ref 82–98)
MONOCYTES # BLD AUTO: 1.03 THOUSAND/ΜL (ref 0.17–1.22)
MONOCYTES NFR BLD AUTO: 13 % (ref 4–12)
NEUTROPHILS # BLD AUTO: 5.11 THOUSANDS/ΜL (ref 1.85–7.62)
NEUTS SEG NFR BLD AUTO: 64 % (ref 43–75)
NITRITE UR QL STRIP: NEGATIVE
PH UR STRIP.AUTO: 6 [PH] (ref 4.5–8)
PLATELET # BLD AUTO: 213 THOUSANDS/UL (ref 149–390)
PMV BLD AUTO: 10 FL (ref 8.9–12.7)
POTASSIUM SERPL-SCNC: 3.6 MMOL/L (ref 3.5–5.3)
PROT SERPL-MCNC: 6.9 G/DL (ref 6.4–8.2)
PROT UR STRIP-MCNC: NEGATIVE MG/DL
PROTHROMBIN TIME: 14 SECONDS (ref 12.1–14.4)
RBC # BLD AUTO: 3.63 MILLION/UL (ref 3.81–5.12)
SODIUM SERPL-SCNC: 142 MMOL/L (ref 136–145)
SP GR UR STRIP.AUTO: 1.01 (ref 1–1.03)
UROBILINOGEN UR QL STRIP.AUTO: 0.2 E.U./DL
WBC # BLD AUTO: 7.95 THOUSAND/UL (ref 4.31–10.16)

## 2017-05-25 PROCEDURE — 85730 THROMBOPLASTIN TIME PARTIAL: CPT | Performed by: EMERGENCY MEDICINE

## 2017-05-25 PROCEDURE — 84443 ASSAY THYROID STIM HORMONE: CPT | Performed by: EMERGENCY MEDICINE

## 2017-05-25 PROCEDURE — 74176 CT ABD & PELVIS W/O CONTRAST: CPT

## 2017-05-25 PROCEDURE — 83690 ASSAY OF LIPASE: CPT | Performed by: EMERGENCY MEDICINE

## 2017-05-25 PROCEDURE — 36415 COLL VENOUS BLD VENIPUNCTURE: CPT | Performed by: EMERGENCY MEDICINE

## 2017-05-25 PROCEDURE — 85025 COMPLETE CBC W/AUTO DIFF WBC: CPT | Performed by: EMERGENCY MEDICINE

## 2017-05-25 PROCEDURE — 71020 HB CHEST X-RAY 2VW FRONTAL&LATL: CPT

## 2017-05-25 PROCEDURE — 85610 PROTHROMBIN TIME: CPT | Performed by: EMERGENCY MEDICINE

## 2017-05-25 PROCEDURE — 93005 ELECTROCARDIOGRAM TRACING: CPT | Performed by: EMERGENCY MEDICINE

## 2017-05-25 PROCEDURE — 81003 URINALYSIS AUTO W/O SCOPE: CPT | Performed by: EMERGENCY MEDICINE

## 2017-05-25 PROCEDURE — 80053 COMPREHEN METABOLIC PANEL: CPT | Performed by: EMERGENCY MEDICINE

## 2017-05-25 PROCEDURE — 83605 ASSAY OF LACTIC ACID: CPT | Performed by: EMERGENCY MEDICINE

## 2017-05-25 RX ORDER — ACETAMINOPHEN 325 MG/1
650 TABLET ORAL ONCE
Status: COMPLETED | OUTPATIENT
Start: 2017-05-25 | End: 2017-05-25

## 2017-05-25 RX ADMIN — IOHEXOL 50 ML: 240 INJECTION, SOLUTION INTRATHECAL; INTRAVASCULAR; INTRAVENOUS; ORAL at 23:27

## 2017-05-25 RX ADMIN — ACETAMINOPHEN 650 MG: 325 TABLET, FILM COATED ORAL at 23:17

## 2017-05-26 VITALS
SYSTOLIC BLOOD PRESSURE: 119 MMHG | RESPIRATION RATE: 14 BRPM | WEIGHT: 272.49 LBS | DIASTOLIC BLOOD PRESSURE: 56 MMHG | HEART RATE: 64 BPM | TEMPERATURE: 98.4 F | OXYGEN SATURATION: 98 % | BODY MASS INDEX: 47.51 KG/M2

## 2017-05-26 LAB
ATRIAL RATE: 68 BPM
LIPASE SERPL-CCNC: 159 U/L (ref 73–393)
P AXIS: 180 DEGREES
PR INTERVAL: 216 MS
QRS AXIS: -19 DEGREES
QRSD INTERVAL: 56 MS
QT INTERVAL: 396 MS
QTC INTERVAL: 408 MS
T WAVE AXIS: 95 DEGREES
TSH SERPL DL<=0.05 MIU/L-ACNC: 3.9 UIU/ML (ref 0.36–3.74)
VENTRICULAR RATE: 64 BPM

## 2017-05-26 PROCEDURE — 99285 EMERGENCY DEPT VISIT HI MDM: CPT

## 2017-05-26 RX ORDER — SODIUM CHLORIDE 9 MG/ML
125 INJECTION, SOLUTION INTRAVENOUS CONTINUOUS
Status: DISCONTINUED | OUTPATIENT
Start: 2017-05-26 | End: 2017-05-26 | Stop reason: HOSPADM

## 2017-06-08 ENCOUNTER — APPOINTMENT (EMERGENCY)
Dept: CT IMAGING | Facility: HOSPITAL | Age: 82
End: 2017-06-08
Payer: MEDICARE

## 2017-06-08 ENCOUNTER — HOSPITAL ENCOUNTER (EMERGENCY)
Facility: HOSPITAL | Age: 82
Discharge: HOME | End: 2017-06-08
Attending: EMERGENCY MEDICINE | Admitting: EMERGENCY MEDICINE
Payer: MEDICARE

## 2017-06-08 VITALS
TEMPERATURE: 97.9 F | WEIGHT: 275.35 LBS | SYSTOLIC BLOOD PRESSURE: 165 MMHG | DIASTOLIC BLOOD PRESSURE: 71 MMHG | HEART RATE: 65 BPM | OXYGEN SATURATION: 97 % | BODY MASS INDEX: 48.01 KG/M2 | RESPIRATION RATE: 18 BRPM

## 2017-06-08 DIAGNOSIS — S09.90XA HEAD INJURY, INITIAL ENCOUNTER: Primary | ICD-10-CM

## 2017-06-08 DIAGNOSIS — S80.12XA MULTIPLE LEG CONTUSIONS, LEFT, INITIAL ENCOUNTER: ICD-10-CM

## 2017-06-08 DIAGNOSIS — S01.112A LACERATION OF LEFT EYEBROW, INITIAL ENCOUNTER: ICD-10-CM

## 2017-06-08 PROCEDURE — 70450 CT HEAD/BRAIN W/O DYE: CPT

## 2017-06-08 PROCEDURE — 90715 TDAP VACCINE 7 YRS/> IM: CPT | Performed by: EMERGENCY MEDICINE

## 2017-06-08 PROCEDURE — 90471 IMMUNIZATION ADMIN: CPT

## 2017-06-08 PROCEDURE — 99284 EMERGENCY DEPT VISIT MOD MDM: CPT

## 2017-06-08 RX ORDER — ACETAMINOPHEN 325 MG/1
650 TABLET ORAL ONCE
Status: COMPLETED | OUTPATIENT
Start: 2017-06-08 | End: 2017-06-08

## 2017-06-08 RX ADMIN — TETANUS TOXOID, REDUCED DIPHTHERIA TOXOID AND ACELLULAR PERTUSSIS VACCINE, ADSORBED 0.5 ML: 5; 2.5; 8; 8; 2.5 SUSPENSION INTRAMUSCULAR at 06:30

## 2017-06-08 RX ADMIN — ACETAMINOPHEN 650 MG: 325 TABLET, FILM COATED ORAL at 06:30

## 2017-06-27 ENCOUNTER — HOSPITAL ENCOUNTER (EMERGENCY)
Facility: HOSPITAL | Age: 82
End: 2017-06-27
Attending: EMERGENCY MEDICINE
Payer: MEDICARE

## 2017-06-27 ENCOUNTER — APPOINTMENT (EMERGENCY)
Dept: CT IMAGING | Facility: HOSPITAL | Age: 82
End: 2017-06-27
Payer: MEDICARE

## 2017-06-27 ENCOUNTER — APPOINTMENT (EMERGENCY)
Dept: RADIOLOGY | Facility: HOSPITAL | Age: 82
End: 2017-06-27
Payer: MEDICARE

## 2017-06-27 ENCOUNTER — HOSPITAL ENCOUNTER (INPATIENT)
Facility: HOSPITAL | Age: 82
LOS: 3 days | Discharge: NURSING, REHAB OR LONG-TERM CARE | DRG: 814 | End: 2017-06-30
Attending: EMERGENCY MEDICINE | Admitting: EMERGENCY MEDICINE
Payer: MEDICARE

## 2017-06-27 VITALS
DIASTOLIC BLOOD PRESSURE: 51 MMHG | WEIGHT: 274.47 LBS | RESPIRATION RATE: 18 BRPM | TEMPERATURE: 98.2 F | SYSTOLIC BLOOD PRESSURE: 117 MMHG | HEART RATE: 73 BPM | OXYGEN SATURATION: 96 % | BODY MASS INDEX: 47.86 KG/M2

## 2017-06-27 DIAGNOSIS — N18.9 ACUTE KIDNEY INJURY SUPERIMPOSED ON CHRONIC KIDNEY DISEASE (HCC): ICD-10-CM

## 2017-06-27 DIAGNOSIS — G96.08 TRAUMATIC SUBDURAL HYGROMA: Primary | ICD-10-CM

## 2017-06-27 DIAGNOSIS — N17.9 ACUTE KIDNEY INJURY SUPERIMPOSED ON CHRONIC KIDNEY DISEASE (HCC): ICD-10-CM

## 2017-06-27 DIAGNOSIS — S00.83XA TRAUMATIC HEMATOMA OF FOREHEAD, INITIAL ENCOUNTER: ICD-10-CM

## 2017-06-27 DIAGNOSIS — J44.1 COPD EXACERBATION (HCC): ICD-10-CM

## 2017-06-27 DIAGNOSIS — S00.31XA NASAL ABRASION, INITIAL ENCOUNTER: ICD-10-CM

## 2017-06-27 DIAGNOSIS — G96.08 SUBDURAL HYGROMA: Primary | ICD-10-CM

## 2017-06-27 DIAGNOSIS — R05.9 COUGH: ICD-10-CM

## 2017-06-27 PROBLEM — H54.7 VISUAL IMPAIRMENT: Status: ACTIVE | Noted: 2017-06-27

## 2017-06-27 PROBLEM — R53.81 PHYSICAL DECONDITIONING: Status: ACTIVE | Noted: 2017-06-27

## 2017-06-27 PROBLEM — R26.2 AMBULATORY DYSFUNCTION: Status: ACTIVE | Noted: 2017-06-27

## 2017-06-27 PROBLEM — R29.6 RECURRENT FALLS: Status: ACTIVE | Noted: 2017-06-27

## 2017-06-27 LAB
ALBUMIN SERPL BCP-MCNC: 3.7 G/DL (ref 3.5–5)
ALP SERPL-CCNC: 65 U/L (ref 46–116)
ALT SERPL W P-5'-P-CCNC: 17 U/L (ref 12–78)
ANION GAP SERPL CALCULATED.3IONS-SCNC: 10 MMOL/L (ref 4–13)
APTT PPP: 34 SECONDS (ref 23–35)
AST SERPL W P-5'-P-CCNC: 19 U/L (ref 5–45)
ATRIAL RATE: 71 BPM
ATRIAL RATE: 75 BPM
BASE EX.OXY STD BLDV CALC-SCNC: 72.8 % (ref 60–80)
BASE EXCESS BLDV CALC-SCNC: 5.3 MMOL/L
BASOPHILS # BLD AUTO: 0.02 THOUSANDS/ΜL (ref 0–0.1)
BASOPHILS NFR BLD AUTO: 0 % (ref 0–1)
BILIRUB SERPL-MCNC: 0.4 MG/DL (ref 0.2–1)
BUN SERPL-MCNC: 52 MG/DL (ref 5–25)
CALCIUM SERPL-MCNC: 9.1 MG/DL (ref 8.3–10.1)
CHLORIDE SERPL-SCNC: 103 MMOL/L (ref 100–108)
CO2 SERPL-SCNC: 32 MMOL/L (ref 21–32)
CREAT SERPL-MCNC: 2.44 MG/DL (ref 0.6–1.3)
EOSINOPHIL # BLD AUTO: 0 THOUSAND/ΜL (ref 0–0.61)
EOSINOPHIL NFR BLD AUTO: 0 % (ref 0–6)
ERYTHROCYTE [DISTWIDTH] IN BLOOD BY AUTOMATED COUNT: 13.8 % (ref 11.6–15.1)
GFR SERPL CREATININE-BSD FRML MDRD: 18.8 ML/MIN/1.73SQ M
GLUCOSE SERPL-MCNC: 107 MG/DL (ref 65–140)
HCO3 BLDV-SCNC: 31.1 MMOL/L (ref 24–30)
HCT VFR BLD AUTO: 32.1 % (ref 34.8–46.1)
HGB BLD-MCNC: 9.8 G/DL (ref 11.5–15.4)
INR PPP: 0.99 (ref 0.86–1.16)
LACTATE SERPL-SCNC: 1.3 MMOL/L (ref 0.5–2)
LYMPHOCYTES # BLD AUTO: 1.02 THOUSANDS/ΜL (ref 0.6–4.47)
LYMPHOCYTES NFR BLD AUTO: 17 % (ref 14–44)
MCH RBC QN AUTO: 27.9 PG (ref 26.8–34.3)
MCHC RBC AUTO-ENTMCNC: 30.5 G/DL (ref 31.4–37.4)
MCV RBC AUTO: 92 FL (ref 82–98)
MONOCYTES # BLD AUTO: 0.64 THOUSAND/ΜL (ref 0.17–1.22)
MONOCYTES NFR BLD AUTO: 10 % (ref 4–12)
NEUTROPHILS # BLD AUTO: 4.46 THOUSANDS/ΜL (ref 1.85–7.62)
NEUTS SEG NFR BLD AUTO: 73 % (ref 43–75)
NT-PROBNP SERPL-MCNC: 537 PG/ML
O2 CT BLDV-SCNC: 11.5 ML/DL
P AXIS: 53 DEGREES
PCO2 BLDV: 51.6 MM HG (ref 42–50)
PH BLDV: 7.4 [PH] (ref 7.3–7.4)
PLATELET # BLD AUTO: 241 THOUSANDS/UL (ref 149–390)
PMV BLD AUTO: 9.9 FL (ref 8.9–12.7)
PO2 BLDV: 42.8 MM HG (ref 35–45)
POTASSIUM SERPL-SCNC: 3.6 MMOL/L (ref 3.5–5.3)
PR INTERVAL: 264 MS
PROT SERPL-MCNC: 7.3 G/DL (ref 6.4–8.2)
PROTHROMBIN TIME: 13.4 SECONDS (ref 12.1–14.4)
QRS AXIS: 0 DEGREES
QRS AXIS: 5 DEGREES
QRSD INTERVAL: 76 MS
QRSD INTERVAL: 84 MS
QT INTERVAL: 362 MS
QT INTERVAL: 384 MS
QTC INTERVAL: 404 MS
QTC INTERVAL: 408 MS
RBC # BLD AUTO: 3.51 MILLION/UL (ref 3.81–5.12)
SODIUM SERPL-SCNC: 145 MMOL/L (ref 136–145)
T WAVE AXIS: -11 DEGREES
T WAVE AXIS: 62 DEGREES
TROPONIN I SERPL-MCNC: <0.02 NG/ML
VENTRICULAR RATE: 68 BPM
VENTRICULAR RATE: 75 BPM
WBC # BLD AUTO: 6.14 THOUSAND/UL (ref 4.31–10.16)

## 2017-06-27 PROCEDURE — 36415 COLL VENOUS BLD VENIPUNCTURE: CPT | Performed by: EMERGENCY MEDICINE

## 2017-06-27 PROCEDURE — 93005 ELECTROCARDIOGRAM TRACING: CPT | Performed by: EMERGENCY MEDICINE

## 2017-06-27 PROCEDURE — 94640 AIRWAY INHALATION TREATMENT: CPT

## 2017-06-27 PROCEDURE — 99285 EMERGENCY DEPT VISIT HI MDM: CPT

## 2017-06-27 PROCEDURE — 80053 COMPREHEN METABOLIC PANEL: CPT | Performed by: EMERGENCY MEDICINE

## 2017-06-27 PROCEDURE — 72125 CT NECK SPINE W/O DYE: CPT

## 2017-06-27 PROCEDURE — 71020 HB CHEST X-RAY 2VW FRONTAL&LATL: CPT

## 2017-06-27 PROCEDURE — 70450 CT HEAD/BRAIN W/O DYE: CPT

## 2017-06-27 PROCEDURE — 84484 ASSAY OF TROPONIN QUANT: CPT | Performed by: EMERGENCY MEDICINE

## 2017-06-27 PROCEDURE — 82805 BLOOD GASES W/O2 SATURATION: CPT | Performed by: EMERGENCY MEDICINE

## 2017-06-27 PROCEDURE — 85730 THROMBOPLASTIN TIME PARTIAL: CPT | Performed by: EMERGENCY MEDICINE

## 2017-06-27 PROCEDURE — 94760 N-INVAS EAR/PLS OXIMETRY 1: CPT

## 2017-06-27 PROCEDURE — 83880 ASSAY OF NATRIURETIC PEPTIDE: CPT | Performed by: EMERGENCY MEDICINE

## 2017-06-27 PROCEDURE — 87040 BLOOD CULTURE FOR BACTERIA: CPT | Performed by: EMERGENCY MEDICINE

## 2017-06-27 PROCEDURE — 83605 ASSAY OF LACTIC ACID: CPT | Performed by: EMERGENCY MEDICINE

## 2017-06-27 PROCEDURE — 85025 COMPLETE CBC W/AUTO DIFF WBC: CPT | Performed by: EMERGENCY MEDICINE

## 2017-06-27 PROCEDURE — 93005 ELECTROCARDIOGRAM TRACING: CPT | Performed by: PHYSICIAN ASSISTANT

## 2017-06-27 PROCEDURE — 85610 PROTHROMBIN TIME: CPT | Performed by: EMERGENCY MEDICINE

## 2017-06-27 RX ORDER — TIMOLOL MALEATE 5 MG/ML
1 SOLUTION/ DROPS OPHTHALMIC DAILY
Status: DISCONTINUED | OUTPATIENT
Start: 2017-06-27 | End: 2017-06-30 | Stop reason: HOSPADM

## 2017-06-27 RX ORDER — ACETAMINOPHEN 325 MG/1
975 TABLET ORAL ONCE
Status: COMPLETED | OUTPATIENT
Start: 2017-06-27 | End: 2017-06-27

## 2017-06-27 RX ORDER — LEVOTHYROXINE SODIUM 0.05 MG/1
50 TABLET ORAL DAILY
Status: DISCONTINUED | OUTPATIENT
Start: 2017-06-27 | End: 2017-06-30 | Stop reason: HOSPADM

## 2017-06-27 RX ORDER — QUETIAPINE FUMARATE 25 MG/1
25 TABLET, FILM COATED ORAL DAILY
Status: DISCONTINUED | OUTPATIENT
Start: 2017-06-27 | End: 2017-06-28

## 2017-06-27 RX ORDER — CITALOPRAM 20 MG/1
40 TABLET ORAL DAILY
Status: DISCONTINUED | OUTPATIENT
Start: 2017-06-27 | End: 2017-06-30 | Stop reason: HOSPADM

## 2017-06-27 RX ORDER — PANTOPRAZOLE SODIUM 40 MG/1
40 TABLET, DELAYED RELEASE ORAL
Status: DISCONTINUED | OUTPATIENT
Start: 2017-06-27 | End: 2017-06-30 | Stop reason: HOSPADM

## 2017-06-27 RX ORDER — LEVALBUTEROL 1.25 MG/.5ML
1.25 SOLUTION, CONCENTRATE RESPIRATORY (INHALATION)
Status: DISCONTINUED | OUTPATIENT
Start: 2017-06-27 | End: 2017-06-28

## 2017-06-27 RX ORDER — SODIUM CHLORIDE 9 MG/ML
75 INJECTION, SOLUTION INTRAVENOUS CONTINUOUS
Status: DISCONTINUED | OUTPATIENT
Start: 2017-06-27 | End: 2017-06-28

## 2017-06-27 RX ORDER — TORSEMIDE 20 MG/1
20 TABLET ORAL DAILY
Status: DISCONTINUED | OUTPATIENT
Start: 2017-06-27 | End: 2017-06-30 | Stop reason: HOSPADM

## 2017-06-27 RX ORDER — ALBUTEROL SULFATE 2.5 MG/3ML
5 SOLUTION RESPIRATORY (INHALATION) ONCE
Status: COMPLETED | OUTPATIENT
Start: 2017-06-27 | End: 2017-06-27

## 2017-06-27 RX ORDER — ALBUTEROL SULFATE 2.5 MG/3ML
2.5 SOLUTION RESPIRATORY (INHALATION) EVERY 4 HOURS PRN
Status: DISCONTINUED | OUTPATIENT
Start: 2017-06-27 | End: 2017-06-30 | Stop reason: HOSPADM

## 2017-06-27 RX ORDER — ALBUTEROL SULFATE 2.5 MG/3ML
SOLUTION RESPIRATORY (INHALATION)
Status: COMPLETED
Start: 2017-06-27 | End: 2017-06-27

## 2017-06-27 RX ORDER — ACETAMINOPHEN 325 MG/1
650 TABLET ORAL EVERY 6 HOURS PRN
Status: DISCONTINUED | OUTPATIENT
Start: 2017-06-27 | End: 2017-06-30 | Stop reason: HOSPADM

## 2017-06-27 RX ORDER — IPRATROPIUM BROMIDE AND ALBUTEROL SULFATE 2.5; .5 MG/3ML; MG/3ML
3 SOLUTION RESPIRATORY (INHALATION) 2 TIMES DAILY
Status: DISCONTINUED | OUTPATIENT
Start: 2017-06-27 | End: 2017-06-27

## 2017-06-27 RX ORDER — SODIUM CHLORIDE FOR INHALATION 0.9 %
VIAL, NEBULIZER (ML) INHALATION
Status: DISCONTINUED
Start: 2017-06-27 | End: 2017-06-27 | Stop reason: WASHOUT

## 2017-06-27 RX ORDER — SODIUM CHLORIDE FOR INHALATION 0.9 %
3 VIAL, NEBULIZER (ML) INHALATION
Status: DISCONTINUED | OUTPATIENT
Start: 2017-06-27 | End: 2017-06-29

## 2017-06-27 RX ADMIN — LEVALBUTEROL HYDROCHLORIDE 1.25 MG: 1.25 SOLUTION, CONCENTRATE RESPIRATORY (INHALATION) at 19:16

## 2017-06-27 RX ADMIN — CITALOPRAM HYDROBROMIDE 40 MG: 20 TABLET ORAL at 11:35

## 2017-06-27 RX ADMIN — BUSPIRONE HYDROCHLORIDE 15 MG: 10 TABLET ORAL at 21:17

## 2017-06-27 RX ADMIN — LEVOTHYROXINE SODIUM 50 MCG: 50 TABLET ORAL at 11:35

## 2017-06-27 RX ADMIN — ACETAMINOPHEN 975 MG: 325 TABLET, FILM COATED ORAL at 02:57

## 2017-06-27 RX ADMIN — ISODIUM CHLORIDE 3 ML: 0.03 SOLUTION RESPIRATORY (INHALATION) at 19:16

## 2017-06-27 RX ADMIN — ALBUTEROL SULFATE: 2.5 SOLUTION RESPIRATORY (INHALATION) at 03:01

## 2017-06-27 RX ADMIN — METOPROLOL SUCCINATE 75 MG: 50 TABLET, EXTENDED RELEASE ORAL at 12:57

## 2017-06-27 RX ADMIN — QUETIAPINE FUMARATE 25 MG: 25 TABLET, FILM COATED ORAL at 12:54

## 2017-06-27 RX ADMIN — QUETIAPINE FUMARATE 150 MG: 100 TABLET, FILM COATED ORAL at 21:18

## 2017-06-27 RX ADMIN — ALBUTEROL SULFATE 5 MG: 2.5 SOLUTION RESPIRATORY (INHALATION) at 02:58

## 2017-06-27 RX ADMIN — TIMOLOL MALEATE 1 DROP: 5 SOLUTION OPHTHALMIC at 21:18

## 2017-06-27 RX ADMIN — BUSPIRONE HYDROCHLORIDE 15 MG: 10 TABLET ORAL at 11:37

## 2017-06-27 RX ADMIN — TORSEMIDE 20 MG: 20 TABLET ORAL at 18:29

## 2017-06-27 RX ADMIN — SODIUM CHLORIDE 75 ML/HR: 0.9 INJECTION, SOLUTION INTRAVENOUS at 12:53

## 2017-06-27 RX ADMIN — PANTOPRAZOLE SODIUM 40 MG: 40 TABLET, DELAYED RELEASE ORAL at 11:38

## 2017-06-28 ENCOUNTER — APPOINTMENT (INPATIENT)
Dept: NON INVASIVE DIAGNOSTICS | Facility: HOSPITAL | Age: 82
DRG: 814 | End: 2017-06-28
Payer: MEDICARE

## 2017-06-28 PROBLEM — I50.32 CHRONIC DIASTOLIC CONGESTIVE HEART FAILURE (HCC): Status: ACTIVE | Noted: 2017-02-04

## 2017-06-28 PROBLEM — N18.9 ACUTE KIDNEY INJURY SUPERIMPOSED ON CHRONIC KIDNEY DISEASE (HCC): Status: ACTIVE | Noted: 2017-02-04

## 2017-06-28 LAB
ANION GAP SERPL CALCULATED.3IONS-SCNC: 8 MMOL/L (ref 4–13)
BUN SERPL-MCNC: 41 MG/DL (ref 5–25)
CALCIUM SERPL-MCNC: 8.5 MG/DL (ref 8.3–10.1)
CHLORIDE SERPL-SCNC: 106 MMOL/L (ref 100–108)
CO2 SERPL-SCNC: 30 MMOL/L (ref 21–32)
CREAT SERPL-MCNC: 1.98 MG/DL (ref 0.6–1.3)
ERYTHROCYTE [DISTWIDTH] IN BLOOD BY AUTOMATED COUNT: 14.1 % (ref 11.6–15.1)
GFR SERPL CREATININE-BSD FRML MDRD: 24 ML/MIN/1.73SQ M
GLUCOSE SERPL-MCNC: 92 MG/DL (ref 65–140)
HCT VFR BLD AUTO: 26.9 % (ref 34.8–46.1)
HGB BLD-MCNC: 8.3 G/DL (ref 11.5–15.4)
MCH RBC QN AUTO: 28.1 PG (ref 26.8–34.3)
MCHC RBC AUTO-ENTMCNC: 30.9 G/DL (ref 31.4–37.4)
MCV RBC AUTO: 91 FL (ref 82–98)
PLATELET # BLD AUTO: 171 THOUSANDS/UL (ref 149–390)
PMV BLD AUTO: 9.6 FL (ref 8.9–12.7)
POTASSIUM SERPL-SCNC: 3.2 MMOL/L (ref 3.5–5.3)
RBC # BLD AUTO: 2.95 MILLION/UL (ref 3.81–5.12)
SODIUM SERPL-SCNC: 144 MMOL/L (ref 136–145)
WBC # BLD AUTO: 5.16 THOUSAND/UL (ref 4.31–10.16)

## 2017-06-28 PROCEDURE — G8978 MOBILITY CURRENT STATUS: HCPCS

## 2017-06-28 PROCEDURE — 80048 BASIC METABOLIC PNL TOTAL CA: CPT | Performed by: ORTHOPAEDIC SURGERY

## 2017-06-28 PROCEDURE — 97163 PT EVAL HIGH COMPLEX 45 MIN: CPT

## 2017-06-28 PROCEDURE — G8979 MOBILITY GOAL STATUS: HCPCS

## 2017-06-28 PROCEDURE — 94760 N-INVAS EAR/PLS OXIMETRY 1: CPT

## 2017-06-28 PROCEDURE — G8987 SELF CARE CURRENT STATUS: HCPCS

## 2017-06-28 PROCEDURE — 97167 OT EVAL HIGH COMPLEX 60 MIN: CPT

## 2017-06-28 PROCEDURE — G8988 SELF CARE GOAL STATUS: HCPCS

## 2017-06-28 PROCEDURE — 94640 AIRWAY INHALATION TREATMENT: CPT

## 2017-06-28 PROCEDURE — 85027 COMPLETE CBC AUTOMATED: CPT | Performed by: ORTHOPAEDIC SURGERY

## 2017-06-28 PROCEDURE — 93970 EXTREMITY STUDY: CPT

## 2017-06-28 RX ORDER — METHYLPREDNISOLONE SODIUM SUCCINATE 40 MG/ML
40 INJECTION, POWDER, LYOPHILIZED, FOR SOLUTION INTRAMUSCULAR; INTRAVENOUS EVERY 12 HOURS
Status: DISCONTINUED | OUTPATIENT
Start: 2017-06-28 | End: 2017-06-30

## 2017-06-28 RX ORDER — LEVALBUTEROL 1.25 MG/.5ML
1.25 SOLUTION, CONCENTRATE RESPIRATORY (INHALATION)
Status: DISCONTINUED | OUTPATIENT
Start: 2017-06-28 | End: 2017-06-30 | Stop reason: HOSPADM

## 2017-06-28 RX ORDER — POTASSIUM CHLORIDE 20 MEQ/1
20 TABLET, EXTENDED RELEASE ORAL DAILY
Status: DISCONTINUED | OUTPATIENT
Start: 2017-06-28 | End: 2017-06-30 | Stop reason: HOSPADM

## 2017-06-28 RX ORDER — LEVALBUTEROL 1.25 MG/.5ML
1.25 SOLUTION, CONCENTRATE RESPIRATORY (INHALATION)
Status: DISCONTINUED | OUTPATIENT
Start: 2017-06-28 | End: 2017-06-28

## 2017-06-28 RX ORDER — QUETIAPINE FUMARATE 25 MG/1
75 TABLET, FILM COATED ORAL
Status: DISCONTINUED | OUTPATIENT
Start: 2017-06-28 | End: 2017-06-30 | Stop reason: HOSPADM

## 2017-06-28 RX ORDER — QUETIAPINE FUMARATE 25 MG/1
12.5 TABLET, FILM COATED ORAL DAILY
Status: DISCONTINUED | OUTPATIENT
Start: 2017-06-29 | End: 2017-06-30 | Stop reason: HOSPADM

## 2017-06-28 RX ADMIN — POTASSIUM CHLORIDE 20 MEQ: 1500 TABLET, EXTENDED RELEASE ORAL at 12:02

## 2017-06-28 RX ADMIN — LEVALBUTEROL HYDROCHLORIDE 1.25 MG: 1.25 SOLUTION, CONCENTRATE RESPIRATORY (INHALATION) at 20:31

## 2017-06-28 RX ADMIN — QUETIAPINE FUMARATE 25 MG: 25 TABLET, FILM COATED ORAL at 08:00

## 2017-06-28 RX ADMIN — ALBUTEROL SULFATE 2.5 MG: 2.5 SOLUTION RESPIRATORY (INHALATION) at 03:45

## 2017-06-28 RX ADMIN — METHYLPREDNISOLONE SODIUM SUCCINATE 40 MG: 40 INJECTION, POWDER, FOR SOLUTION INTRAMUSCULAR; INTRAVENOUS at 17:51

## 2017-06-28 RX ADMIN — ISODIUM CHLORIDE 3 ML: 0.03 SOLUTION RESPIRATORY (INHALATION) at 08:40

## 2017-06-28 RX ADMIN — PANTOPRAZOLE SODIUM 40 MG: 40 TABLET, DELAYED RELEASE ORAL at 05:29

## 2017-06-28 RX ADMIN — LEVALBUTEROL HYDROCHLORIDE 1.25 MG: 1.25 SOLUTION, CONCENTRATE RESPIRATORY (INHALATION) at 14:41

## 2017-06-28 RX ADMIN — CITALOPRAM HYDROBROMIDE 40 MG: 20 TABLET ORAL at 08:00

## 2017-06-28 RX ADMIN — ALBUTEROL SULFATE 2.5 MG: 2.5 SOLUTION RESPIRATORY (INHALATION) at 00:39

## 2017-06-28 RX ADMIN — SODIUM CHLORIDE 75 ML/HR: 0.9 INJECTION, SOLUTION INTRAVENOUS at 03:16

## 2017-06-28 RX ADMIN — QUETIAPINE FUMARATE 75 MG: 25 TABLET, FILM COATED ORAL at 19:28

## 2017-06-28 RX ADMIN — ACETAMINOPHEN 650 MG: 325 TABLET, FILM COATED ORAL at 19:31

## 2017-06-28 RX ADMIN — ISODIUM CHLORIDE 3 ML: 0.03 SOLUTION RESPIRATORY (INHALATION) at 14:41

## 2017-06-28 RX ADMIN — LEVOTHYROXINE SODIUM 50 MCG: 50 TABLET ORAL at 06:08

## 2017-06-28 RX ADMIN — BUSPIRONE HYDROCHLORIDE 15 MG: 10 TABLET ORAL at 12:02

## 2017-06-28 RX ADMIN — BUSPIRONE HYDROCHLORIDE 15 MG: 10 TABLET ORAL at 20:44

## 2017-06-28 RX ADMIN — TIMOLOL MALEATE 1 DROP: 5 SOLUTION OPHTHALMIC at 19:29

## 2017-06-28 RX ADMIN — LEVALBUTEROL HYDROCHLORIDE 1.25 MG: 1.25 SOLUTION, CONCENTRATE RESPIRATORY (INHALATION) at 08:40

## 2017-06-28 RX ADMIN — METOPROLOL SUCCINATE 75 MG: 50 TABLET, EXTENDED RELEASE ORAL at 08:00

## 2017-06-28 RX ADMIN — TORSEMIDE 20 MG: 20 TABLET ORAL at 17:50

## 2017-06-28 RX ADMIN — BUSPIRONE HYDROCHLORIDE 15 MG: 10 TABLET ORAL at 08:00

## 2017-06-28 RX ADMIN — BUSPIRONE HYDROCHLORIDE 15 MG: 10 TABLET ORAL at 17:51

## 2017-06-28 RX ADMIN — IPRATROPIUM BROMIDE 0.5 MG: 0.5 SOLUTION RESPIRATORY (INHALATION) at 20:31

## 2017-06-29 PROCEDURE — 97112 NEUROMUSCULAR REEDUCATION: CPT

## 2017-06-29 PROCEDURE — 94760 N-INVAS EAR/PLS OXIMETRY 1: CPT

## 2017-06-29 PROCEDURE — 97116 GAIT TRAINING THERAPY: CPT

## 2017-06-29 PROCEDURE — 94640 AIRWAY INHALATION TREATMENT: CPT

## 2017-06-29 PROCEDURE — 97110 THERAPEUTIC EXERCISES: CPT

## 2017-06-29 RX ORDER — FUROSEMIDE 10 MG/ML
40 INJECTION INTRAMUSCULAR; INTRAVENOUS ONCE
Status: COMPLETED | OUTPATIENT
Start: 2017-06-29 | End: 2017-06-29

## 2017-06-29 RX ORDER — POTASSIUM CHLORIDE 20 MEQ/1
40 TABLET, EXTENDED RELEASE ORAL ONCE
Status: COMPLETED | OUTPATIENT
Start: 2017-06-29 | End: 2017-06-29

## 2017-06-29 RX ADMIN — IPRATROPIUM BROMIDE 0.5 MG: 0.5 SOLUTION RESPIRATORY (INHALATION) at 19:05

## 2017-06-29 RX ADMIN — METHYLPREDNISOLONE SODIUM SUCCINATE 40 MG: 40 INJECTION, POWDER, FOR SOLUTION INTRAMUSCULAR; INTRAVENOUS at 17:40

## 2017-06-29 RX ADMIN — BUSPIRONE HYDROCHLORIDE 15 MG: 10 TABLET ORAL at 11:44

## 2017-06-29 RX ADMIN — BUSPIRONE HYDROCHLORIDE 15 MG: 10 TABLET ORAL at 21:16

## 2017-06-29 RX ADMIN — POTASSIUM CHLORIDE 20 MEQ: 1500 TABLET, EXTENDED RELEASE ORAL at 08:48

## 2017-06-29 RX ADMIN — BUSPIRONE HYDROCHLORIDE 15 MG: 10 TABLET ORAL at 17:40

## 2017-06-29 RX ADMIN — LEVALBUTEROL HYDROCHLORIDE 1.25 MG: 1.25 SOLUTION, CONCENTRATE RESPIRATORY (INHALATION) at 00:55

## 2017-06-29 RX ADMIN — METHYLPREDNISOLONE SODIUM SUCCINATE 40 MG: 40 INJECTION, POWDER, FOR SOLUTION INTRAMUSCULAR; INTRAVENOUS at 06:42

## 2017-06-29 RX ADMIN — CITALOPRAM HYDROBROMIDE 40 MG: 20 TABLET ORAL at 08:47

## 2017-06-29 RX ADMIN — POTASSIUM CHLORIDE 40 MEQ: 1500 TABLET, EXTENDED RELEASE ORAL at 17:40

## 2017-06-29 RX ADMIN — PANTOPRAZOLE SODIUM 40 MG: 40 TABLET, DELAYED RELEASE ORAL at 06:42

## 2017-06-29 RX ADMIN — IPRATROPIUM BROMIDE 0.5 MG: 0.5 SOLUTION RESPIRATORY (INHALATION) at 13:45

## 2017-06-29 RX ADMIN — LEVOTHYROXINE SODIUM 50 MCG: 50 TABLET ORAL at 06:42

## 2017-06-29 RX ADMIN — QUETIAPINE FUMARATE 75 MG: 25 TABLET, FILM COATED ORAL at 20:50

## 2017-06-29 RX ADMIN — FUROSEMIDE 40 MG: 10 INJECTION, SOLUTION INTRAMUSCULAR; INTRAVENOUS at 11:44

## 2017-06-29 RX ADMIN — ENOXAPARIN SODIUM 30 MG: 30 INJECTION SUBCUTANEOUS at 08:48

## 2017-06-29 RX ADMIN — IPRATROPIUM BROMIDE 0.5 MG: 0.5 SOLUTION RESPIRATORY (INHALATION) at 00:55

## 2017-06-29 RX ADMIN — LEVALBUTEROL HYDROCHLORIDE 1.25 MG: 1.25 SOLUTION, CONCENTRATE RESPIRATORY (INHALATION) at 13:45

## 2017-06-29 RX ADMIN — IPRATROPIUM BROMIDE 0.5 MG: 0.5 SOLUTION RESPIRATORY (INHALATION) at 07:28

## 2017-06-29 RX ADMIN — LEVALBUTEROL HYDROCHLORIDE 1.25 MG: 1.25 SOLUTION, CONCENTRATE RESPIRATORY (INHALATION) at 19:05

## 2017-06-29 RX ADMIN — LEVALBUTEROL HYDROCHLORIDE 1.25 MG: 1.25 SOLUTION, CONCENTRATE RESPIRATORY (INHALATION) at 07:28

## 2017-06-29 RX ADMIN — BUSPIRONE HYDROCHLORIDE 15 MG: 10 TABLET ORAL at 08:47

## 2017-06-29 RX ADMIN — TIMOLOL MALEATE 1 DROP: 5 SOLUTION OPHTHALMIC at 20:34

## 2017-06-29 RX ADMIN — TORSEMIDE 20 MG: 20 TABLET ORAL at 17:40

## 2017-06-29 RX ADMIN — METOPROLOL SUCCINATE 75 MG: 50 TABLET, EXTENDED RELEASE ORAL at 08:47

## 2017-06-29 RX ADMIN — QUETIAPINE FUMARATE 12.5 MG: 25 TABLET, FILM COATED ORAL at 08:47

## 2017-06-30 ENCOUNTER — APPOINTMENT (INPATIENT)
Dept: RADIOLOGY | Facility: HOSPITAL | Age: 82
DRG: 814 | End: 2017-06-30
Payer: MEDICARE

## 2017-06-30 VITALS
BODY MASS INDEX: 45.82 KG/M2 | RESPIRATION RATE: 20 BRPM | WEIGHT: 275 LBS | OXYGEN SATURATION: 93 % | SYSTOLIC BLOOD PRESSURE: 174 MMHG | HEART RATE: 68 BPM | HEIGHT: 65 IN | DIASTOLIC BLOOD PRESSURE: 80 MMHG | TEMPERATURE: 98.9 F

## 2017-06-30 LAB
ANION GAP SERPL CALCULATED.3IONS-SCNC: 7 MMOL/L (ref 4–13)
BUN SERPL-MCNC: 46 MG/DL (ref 5–25)
CALCIUM SERPL-MCNC: 8.9 MG/DL (ref 8.3–10.1)
CHLORIDE SERPL-SCNC: 105 MMOL/L (ref 100–108)
CO2 SERPL-SCNC: 31 MMOL/L (ref 21–32)
CREAT SERPL-MCNC: 2.04 MG/DL (ref 0.6–1.3)
FERRITIN SERPL-MCNC: 96 NG/ML (ref 8–388)
GFR SERPL CREATININE-BSD FRML MDRD: 23.1 ML/MIN/1.73SQ M
GLUCOSE SERPL-MCNC: 112 MG/DL (ref 65–140)
IRON SATN MFR SERPL: 10 %
IRON SERPL-MCNC: 22 UG/DL (ref 50–170)
MAGNESIUM SERPL-MCNC: 1.9 MG/DL (ref 1.6–2.6)
PHOSPHATE SERPL-MCNC: 2.7 MG/DL (ref 2.3–4.1)
POTASSIUM SERPL-SCNC: 4.1 MMOL/L (ref 3.5–5.3)
PTH-INTACT SERPL-MCNC: 134.2 PG/ML (ref 14–72)
SODIUM SERPL-SCNC: 143 MMOL/L (ref 136–145)
TIBC SERPL-MCNC: 225 UG/DL (ref 250–450)

## 2017-06-30 PROCEDURE — 84100 ASSAY OF PHOSPHORUS: CPT | Performed by: NURSE PRACTITIONER

## 2017-06-30 PROCEDURE — 94640 AIRWAY INHALATION TREATMENT: CPT

## 2017-06-30 PROCEDURE — 94760 N-INVAS EAR/PLS OXIMETRY 1: CPT

## 2017-06-30 PROCEDURE — 76770 US EXAM ABDO BACK WALL COMP: CPT

## 2017-06-30 PROCEDURE — 83970 ASSAY OF PARATHORMONE: CPT | Performed by: NURSE PRACTITIONER

## 2017-06-30 PROCEDURE — 83550 IRON BINDING TEST: CPT | Performed by: NURSE PRACTITIONER

## 2017-06-30 PROCEDURE — 83735 ASSAY OF MAGNESIUM: CPT | Performed by: NURSE PRACTITIONER

## 2017-06-30 PROCEDURE — 82728 ASSAY OF FERRITIN: CPT | Performed by: NURSE PRACTITIONER

## 2017-06-30 PROCEDURE — 83540 ASSAY OF IRON: CPT | Performed by: NURSE PRACTITIONER

## 2017-06-30 PROCEDURE — 80048 BASIC METABOLIC PNL TOTAL CA: CPT | Performed by: ORTHOPAEDIC SURGERY

## 2017-06-30 RX ORDER — PREDNISONE 20 MG/1
10 TABLET ORAL DAILY
Qty: 6 TABLET | Refills: 0
Start: 2017-06-30 | End: 2017-08-18 | Stop reason: HOSPADM

## 2017-06-30 RX ORDER — PREDNISONE 20 MG/1
40 TABLET ORAL DAILY
Status: DISCONTINUED | OUTPATIENT
Start: 2017-06-30 | End: 2017-06-30 | Stop reason: HOSPADM

## 2017-06-30 RX ORDER — QUETIAPINE FUMARATE 25 MG/1
75 TABLET, FILM COATED ORAL
Refills: 0
Start: 2017-06-30 | End: 2019-04-11 | Stop reason: ALTCHOICE

## 2017-06-30 RX ORDER — QUETIAPINE FUMARATE 25 MG/1
12.5 TABLET, FILM COATED ORAL DAILY
Refills: 0
Start: 2017-06-30 | End: 2018-04-09 | Stop reason: DRUGHIGH

## 2017-06-30 RX ADMIN — ENOXAPARIN SODIUM 30 MG: 30 INJECTION SUBCUTANEOUS at 09:23

## 2017-06-30 RX ADMIN — IPRATROPIUM BROMIDE 0.5 MG: 0.5 SOLUTION RESPIRATORY (INHALATION) at 00:43

## 2017-06-30 RX ADMIN — POTASSIUM CHLORIDE 20 MEQ: 1500 TABLET, EXTENDED RELEASE ORAL at 09:22

## 2017-06-30 RX ADMIN — BUSPIRONE HYDROCHLORIDE 15 MG: 10 TABLET ORAL at 12:25

## 2017-06-30 RX ADMIN — METHYLPREDNISOLONE SODIUM SUCCINATE 40 MG: 40 INJECTION, POWDER, FOR SOLUTION INTRAMUSCULAR; INTRAVENOUS at 05:54

## 2017-06-30 RX ADMIN — LEVALBUTEROL HYDROCHLORIDE 1.25 MG: 1.25 SOLUTION, CONCENTRATE RESPIRATORY (INHALATION) at 13:54

## 2017-06-30 RX ADMIN — BUSPIRONE HYDROCHLORIDE 15 MG: 10 TABLET ORAL at 09:22

## 2017-06-30 RX ADMIN — PANTOPRAZOLE SODIUM 40 MG: 40 TABLET, DELAYED RELEASE ORAL at 05:54

## 2017-06-30 RX ADMIN — CITALOPRAM HYDROBROMIDE 40 MG: 20 TABLET ORAL at 09:22

## 2017-06-30 RX ADMIN — IPRATROPIUM BROMIDE 0.5 MG: 0.5 SOLUTION RESPIRATORY (INHALATION) at 07:22

## 2017-06-30 RX ADMIN — LEVALBUTEROL HYDROCHLORIDE 1.25 MG: 1.25 SOLUTION, CONCENTRATE RESPIRATORY (INHALATION) at 07:22

## 2017-06-30 RX ADMIN — QUETIAPINE FUMARATE 12.5 MG: 25 TABLET, FILM COATED ORAL at 09:22

## 2017-06-30 RX ADMIN — PREDNISONE 40 MG: 20 TABLET ORAL at 12:31

## 2017-06-30 RX ADMIN — LEVALBUTEROL HYDROCHLORIDE 1.25 MG: 1.25 SOLUTION, CONCENTRATE RESPIRATORY (INHALATION) at 00:42

## 2017-06-30 RX ADMIN — LEVOTHYROXINE SODIUM 50 MCG: 50 TABLET ORAL at 06:04

## 2017-06-30 RX ADMIN — IPRATROPIUM BROMIDE 0.5 MG: 0.5 SOLUTION RESPIRATORY (INHALATION) at 13:54

## 2017-06-30 RX ADMIN — METOPROLOL SUCCINATE 75 MG: 50 TABLET, EXTENDED RELEASE ORAL at 09:22

## 2017-07-02 LAB
BACTERIA BLD CULT: NORMAL
BACTERIA BLD CULT: NORMAL

## 2017-07-03 ENCOUNTER — TRANSCRIBE ORDERS (OUTPATIENT)
Dept: ADMINISTRATIVE | Facility: HOSPITAL | Age: 82
End: 2017-07-03

## 2017-07-03 DIAGNOSIS — G96.08 SUBDURAL HYGROMA: Primary | ICD-10-CM

## 2017-08-09 ENCOUNTER — HOSPITAL ENCOUNTER (INPATIENT)
Facility: HOSPITAL | Age: 82
LOS: 9 days | Discharge: NURSING, REHAB OR LONG-TERM CARE | DRG: 563 | End: 2017-08-18
Attending: EMERGENCY MEDICINE | Admitting: FAMILY MEDICINE
Payer: MEDICARE

## 2017-08-09 ENCOUNTER — APPOINTMENT (INPATIENT)
Dept: NON INVASIVE DIAGNOSTICS | Facility: HOSPITAL | Age: 82
DRG: 563 | End: 2017-08-09
Payer: MEDICARE

## 2017-08-09 ENCOUNTER — APPOINTMENT (EMERGENCY)
Dept: RADIOLOGY | Facility: HOSPITAL | Age: 82
DRG: 563 | End: 2017-08-09
Payer: MEDICARE

## 2017-08-09 DIAGNOSIS — R06.02 SHORTNESS OF BREATH: ICD-10-CM

## 2017-08-09 DIAGNOSIS — F32.A DEPRESSION: ICD-10-CM

## 2017-08-09 DIAGNOSIS — M79.673 FOOT PAIN: ICD-10-CM

## 2017-08-09 DIAGNOSIS — N18.4 CHRONIC KIDNEY DISEASE, STAGE 4 (SEVERE) (HCC): ICD-10-CM

## 2017-08-09 DIAGNOSIS — S92.919A: Primary | ICD-10-CM

## 2017-08-09 DIAGNOSIS — R26.2 AMBULATORY DYSFUNCTION: ICD-10-CM

## 2017-08-09 DIAGNOSIS — M79.89 PAIN AND SWELLING OF LEFT LOWER LEG: ICD-10-CM

## 2017-08-09 DIAGNOSIS — M79.662 PAIN AND SWELLING OF LEFT LOWER LEG: ICD-10-CM

## 2017-08-09 DIAGNOSIS — L53.9 FINGER ERYTHEMA: ICD-10-CM

## 2017-08-09 PROBLEM — N18.9 ACUTE KIDNEY INJURY SUPERIMPOSED ON CHRONIC KIDNEY DISEASE (HCC): Status: RESOLVED | Noted: 2017-02-04 | Resolved: 2017-08-09

## 2017-08-09 PROBLEM — N17.9 ACUTE KIDNEY INJURY SUPERIMPOSED ON CHRONIC KIDNEY DISEASE (HCC): Status: RESOLVED | Noted: 2017-02-04 | Resolved: 2017-08-09

## 2017-08-09 LAB
ANION GAP SERPL CALCULATED.3IONS-SCNC: 10 MMOL/L (ref 4–13)
APTT PPP: 34 SECONDS (ref 23–35)
BUN SERPL-MCNC: 65 MG/DL (ref 5–25)
CALCIUM SERPL-MCNC: 8.7 MG/DL (ref 8.3–10.1)
CHLORIDE SERPL-SCNC: 100 MMOL/L (ref 100–108)
CO2 SERPL-SCNC: 29 MMOL/L (ref 21–32)
CREAT SERPL-MCNC: 2.65 MG/DL (ref 0.6–1.3)
ERYTHROCYTE [DISTWIDTH] IN BLOOD BY AUTOMATED COUNT: 14.2 % (ref 11.6–15.1)
GFR SERPL CREATININE-BSD FRML MDRD: 16 ML/MIN/1.73SQ M
GLUCOSE SERPL-MCNC: 104 MG/DL (ref 65–140)
HCT VFR BLD AUTO: 25 % (ref 34.8–46.1)
HGB BLD-MCNC: 8.2 G/DL (ref 11.5–15.4)
MCH RBC QN AUTO: 27.9 PG (ref 26.8–34.3)
MCHC RBC AUTO-ENTMCNC: 32.8 G/DL (ref 31.4–37.4)
MCV RBC AUTO: 85 FL (ref 82–98)
PLATELET # BLD AUTO: 218 THOUSANDS/UL (ref 149–390)
PMV BLD AUTO: 9.8 FL (ref 8.9–12.7)
POTASSIUM SERPL-SCNC: 3.8 MMOL/L (ref 3.5–5.3)
RBC # BLD AUTO: 2.94 MILLION/UL (ref 3.81–5.12)
SODIUM SERPL-SCNC: 139 MMOL/L (ref 136–145)
WBC # BLD AUTO: 9.58 THOUSAND/UL (ref 4.31–10.16)

## 2017-08-09 PROCEDURE — G8978 MOBILITY CURRENT STATUS: HCPCS

## 2017-08-09 PROCEDURE — 85027 COMPLETE CBC AUTOMATED: CPT | Performed by: FAMILY MEDICINE

## 2017-08-09 PROCEDURE — 73590 X-RAY EXAM OF LOWER LEG: CPT

## 2017-08-09 PROCEDURE — 80048 BASIC METABOLIC PNL TOTAL CA: CPT | Performed by: FAMILY MEDICINE

## 2017-08-09 PROCEDURE — 73700 CT LOWER EXTREMITY W/O DYE: CPT

## 2017-08-09 PROCEDURE — G8979 MOBILITY GOAL STATUS: HCPCS

## 2017-08-09 PROCEDURE — 73630 X-RAY EXAM OF FOOT: CPT

## 2017-08-09 PROCEDURE — 85730 THROMBOPLASTIN TIME PARTIAL: CPT | Performed by: FAMILY MEDICINE

## 2017-08-09 PROCEDURE — 93970 EXTREMITY STUDY: CPT

## 2017-08-09 PROCEDURE — 97163 PT EVAL HIGH COMPLEX 45 MIN: CPT

## 2017-08-09 PROCEDURE — 99285 EMERGENCY DEPT VISIT HI MDM: CPT

## 2017-08-09 PROCEDURE — 36415 COLL VENOUS BLD VENIPUNCTURE: CPT | Performed by: FAMILY MEDICINE

## 2017-08-09 PROCEDURE — 73610 X-RAY EXAM OF ANKLE: CPT

## 2017-08-09 RX ORDER — LEVOTHYROXINE SODIUM 0.05 MG/1
50 TABLET ORAL
Status: DISCONTINUED | OUTPATIENT
Start: 2017-08-10 | End: 2017-08-18 | Stop reason: HOSPADM

## 2017-08-09 RX ORDER — ONDANSETRON 2 MG/ML
4 INJECTION INTRAMUSCULAR; INTRAVENOUS EVERY 6 HOURS PRN
Status: DISCONTINUED | OUTPATIENT
Start: 2017-08-09 | End: 2017-08-18 | Stop reason: HOSPADM

## 2017-08-09 RX ORDER — MELATONIN
2000 DAILY
Status: DISCONTINUED | OUTPATIENT
Start: 2017-08-09 | End: 2017-08-18 | Stop reason: HOSPADM

## 2017-08-09 RX ORDER — CITALOPRAM 20 MG/1
40 TABLET ORAL DAILY
Status: DISCONTINUED | OUTPATIENT
Start: 2017-08-09 | End: 2017-08-18 | Stop reason: HOSPADM

## 2017-08-09 RX ORDER — TIMOLOL MALEATE 5 MG/ML
1 SOLUTION/ DROPS OPHTHALMIC DAILY
Status: DISCONTINUED | OUTPATIENT
Start: 2017-08-10 | End: 2017-08-18 | Stop reason: HOSPADM

## 2017-08-09 RX ORDER — QUETIAPINE FUMARATE 25 MG/1
12.5 TABLET, FILM COATED ORAL DAILY
Status: DISCONTINUED | OUTPATIENT
Start: 2017-08-10 | End: 2017-08-18 | Stop reason: HOSPADM

## 2017-08-09 RX ORDER — ACETAMINOPHEN 325 MG/1
975 TABLET ORAL ONCE
Status: COMPLETED | OUTPATIENT
Start: 2017-08-09 | End: 2017-08-09

## 2017-08-09 RX ORDER — ACETAMINOPHEN 325 MG/1
650 TABLET ORAL EVERY 6 HOURS PRN
Status: DISCONTINUED | OUTPATIENT
Start: 2017-08-09 | End: 2017-08-18 | Stop reason: HOSPADM

## 2017-08-09 RX ORDER — LORATADINE 10 MG/1
10 TABLET ORAL DAILY
Status: DISCONTINUED | OUTPATIENT
Start: 2017-08-10 | End: 2017-08-18 | Stop reason: HOSPADM

## 2017-08-09 RX ORDER — PANTOPRAZOLE SODIUM 40 MG/1
40 TABLET, DELAYED RELEASE ORAL
Status: DISCONTINUED | OUTPATIENT
Start: 2017-08-10 | End: 2017-08-18 | Stop reason: HOSPADM

## 2017-08-09 RX ORDER — QUETIAPINE FUMARATE 25 MG/1
75 TABLET, FILM COATED ORAL
Status: DISCONTINUED | OUTPATIENT
Start: 2017-08-09 | End: 2017-08-18 | Stop reason: HOSPADM

## 2017-08-09 RX ORDER — TORSEMIDE 20 MG/1
20 TABLET ORAL DAILY
Status: DISCONTINUED | OUTPATIENT
Start: 2017-08-09 | End: 2017-08-18 | Stop reason: HOSPADM

## 2017-08-09 RX ADMIN — METOPROLOL SUCCINATE 75 MG: 50 TABLET, EXTENDED RELEASE ORAL at 17:50

## 2017-08-09 RX ADMIN — BUSPIRONE HYDROCHLORIDE 15 MG: 10 TABLET ORAL at 21:04

## 2017-08-09 RX ADMIN — TORSEMIDE 20 MG: 20 TABLET ORAL at 17:51

## 2017-08-09 RX ADMIN — LOSARTAN POTASSIUM: 50 TABLET, FILM COATED ORAL at 17:51

## 2017-08-09 RX ADMIN — VITAMIN D, TAB 1000IU (100/BT) 2000 UNITS: 25 TAB at 17:51

## 2017-08-09 RX ADMIN — CITALOPRAM HYDROBROMIDE 40 MG: 20 TABLET ORAL at 17:51

## 2017-08-09 RX ADMIN — QUETIAPINE FUMARATE 75 MG: 25 TABLET, FILM COATED ORAL at 21:04

## 2017-08-09 RX ADMIN — ACETAMINOPHEN 975 MG: 325 TABLET, FILM COATED ORAL at 02:32

## 2017-08-09 RX ADMIN — BUSPIRONE HYDROCHLORIDE 15 MG: 10 TABLET ORAL at 17:51

## 2017-08-10 ENCOUNTER — APPOINTMENT (INPATIENT)
Dept: PHYSICAL THERAPY | Facility: HOSPITAL | Age: 82
DRG: 563 | End: 2017-08-10
Payer: MEDICARE

## 2017-08-10 PROBLEM — N18.9 CHRONIC KIDNEY DISEASE: Status: ACTIVE | Noted: 2017-08-10

## 2017-08-10 PROBLEM — S92.912A FRACTURE OF PHALANX OF TOE OF LEFT FOOT: Status: ACTIVE | Noted: 2017-08-09

## 2017-08-10 LAB
PLATELET # BLD AUTO: 190 THOUSANDS/UL (ref 149–390)
PMV BLD AUTO: 9.7 FL (ref 8.9–12.7)

## 2017-08-10 PROCEDURE — 97112 NEUROMUSCULAR REEDUCATION: CPT

## 2017-08-10 PROCEDURE — 97167 OT EVAL HIGH COMPLEX 60 MIN: CPT

## 2017-08-10 PROCEDURE — G8988 SELF CARE GOAL STATUS: HCPCS

## 2017-08-10 PROCEDURE — 97535 SELF CARE MNGMENT TRAINING: CPT

## 2017-08-10 PROCEDURE — 85049 AUTOMATED PLATELET COUNT: CPT | Performed by: FAMILY MEDICINE

## 2017-08-10 PROCEDURE — 97530 THERAPEUTIC ACTIVITIES: CPT

## 2017-08-10 PROCEDURE — 97110 THERAPEUTIC EXERCISES: CPT

## 2017-08-10 PROCEDURE — G8987 SELF CARE CURRENT STATUS: HCPCS

## 2017-08-10 RX ORDER — HEPARIN SODIUM 5000 [USP'U]/ML
5000 INJECTION, SOLUTION INTRAVENOUS; SUBCUTANEOUS EVERY 8 HOURS SCHEDULED
Status: DISCONTINUED | OUTPATIENT
Start: 2017-08-10 | End: 2017-08-18 | Stop reason: HOSPADM

## 2017-08-10 RX ADMIN — VITAMIN D, TAB 1000IU (100/BT) 2000 UNITS: 25 TAB at 10:31

## 2017-08-10 RX ADMIN — METOPROLOL SUCCINATE 75 MG: 50 TABLET, EXTENDED RELEASE ORAL at 10:33

## 2017-08-10 RX ADMIN — BUSPIRONE HYDROCHLORIDE 15 MG: 10 TABLET ORAL at 20:21

## 2017-08-10 RX ADMIN — PANTOPRAZOLE SODIUM 40 MG: 40 TABLET, DELAYED RELEASE ORAL at 06:11

## 2017-08-10 RX ADMIN — CITALOPRAM HYDROBROMIDE 40 MG: 20 TABLET ORAL at 10:32

## 2017-08-10 RX ADMIN — ACETAMINOPHEN 650 MG: 325 TABLET, FILM COATED ORAL at 20:21

## 2017-08-10 RX ADMIN — TORSEMIDE 20 MG: 20 TABLET ORAL at 10:31

## 2017-08-10 RX ADMIN — LEVOTHYROXINE SODIUM 50 MCG: 50 TABLET ORAL at 06:11

## 2017-08-10 RX ADMIN — HEPARIN SODIUM 5000 UNITS: 5000 INJECTION, SOLUTION INTRAVENOUS; SUBCUTANEOUS at 10:39

## 2017-08-10 RX ADMIN — QUETIAPINE FUMARATE 75 MG: 25 TABLET, FILM COATED ORAL at 23:51

## 2017-08-10 RX ADMIN — HEPARIN SODIUM 5000 UNITS: 5000 INJECTION, SOLUTION INTRAVENOUS; SUBCUTANEOUS at 16:38

## 2017-08-10 RX ADMIN — LOSARTAN POTASSIUM: 50 TABLET, FILM COATED ORAL at 10:38

## 2017-08-10 RX ADMIN — TIMOLOL MALEATE 1 DROP: 5 SOLUTION OPHTHALMIC at 10:34

## 2017-08-10 RX ADMIN — HEPARIN SODIUM 5000 UNITS: 5000 INJECTION, SOLUTION INTRAVENOUS; SUBCUTANEOUS at 23:51

## 2017-08-10 RX ADMIN — BUSPIRONE HYDROCHLORIDE 15 MG: 10 TABLET ORAL at 16:38

## 2017-08-10 RX ADMIN — LORATADINE 10 MG: 10 TABLET ORAL at 10:33

## 2017-08-10 RX ADMIN — BUSPIRONE HYDROCHLORIDE 15 MG: 10 TABLET ORAL at 10:33

## 2017-08-10 RX ADMIN — QUETIAPINE FUMARATE 12.5 MG: 25 TABLET, FILM COATED ORAL at 10:38

## 2017-08-11 ENCOUNTER — APPOINTMENT (INPATIENT)
Dept: PHYSICAL THERAPY | Facility: HOSPITAL | Age: 82
DRG: 563 | End: 2017-08-11
Payer: MEDICARE

## 2017-08-11 ENCOUNTER — APPOINTMENT (INPATIENT)
Dept: OCCUPATIONAL THERAPY | Facility: HOSPITAL | Age: 82
DRG: 563 | End: 2017-08-11
Payer: MEDICARE

## 2017-08-11 LAB
ANION GAP SERPL CALCULATED.3IONS-SCNC: 10 MMOL/L (ref 4–13)
BUN SERPL-MCNC: 60 MG/DL (ref 5–25)
CALCIUM SERPL-MCNC: 8.9 MG/DL (ref 8.3–10.1)
CHLORIDE SERPL-SCNC: 98 MMOL/L (ref 100–108)
CO2 SERPL-SCNC: 29 MMOL/L (ref 21–32)
CREAT SERPL-MCNC: 2.55 MG/DL (ref 0.6–1.3)
ERYTHROCYTE [DISTWIDTH] IN BLOOD BY AUTOMATED COUNT: 14.3 % (ref 11.6–15.1)
GFR SERPL CREATININE-BSD FRML MDRD: 17 ML/MIN/1.73SQ M
GLUCOSE SERPL-MCNC: 99 MG/DL (ref 65–140)
HCT VFR BLD AUTO: 25.5 % (ref 34.8–46.1)
HGB BLD-MCNC: 8 G/DL (ref 11.5–15.4)
MCH RBC QN AUTO: 27.1 PG (ref 26.8–34.3)
MCHC RBC AUTO-ENTMCNC: 31.4 G/DL (ref 31.4–37.4)
MCV RBC AUTO: 86 FL (ref 82–98)
PLATELET # BLD AUTO: 216 THOUSANDS/UL (ref 149–390)
PMV BLD AUTO: 9.5 FL (ref 8.9–12.7)
POTASSIUM SERPL-SCNC: 3.1 MMOL/L (ref 3.5–5.3)
RBC # BLD AUTO: 2.95 MILLION/UL (ref 3.81–5.12)
SODIUM SERPL-SCNC: 137 MMOL/L (ref 136–145)
WBC # BLD AUTO: 9.17 THOUSAND/UL (ref 4.31–10.16)

## 2017-08-11 PROCEDURE — 97535 SELF CARE MNGMENT TRAINING: CPT

## 2017-08-11 PROCEDURE — 97530 THERAPEUTIC ACTIVITIES: CPT

## 2017-08-11 PROCEDURE — 97112 NEUROMUSCULAR REEDUCATION: CPT

## 2017-08-11 PROCEDURE — 80048 BASIC METABOLIC PNL TOTAL CA: CPT | Performed by: FAMILY MEDICINE

## 2017-08-11 PROCEDURE — 97110 THERAPEUTIC EXERCISES: CPT

## 2017-08-11 PROCEDURE — 85027 COMPLETE CBC AUTOMATED: CPT | Performed by: FAMILY MEDICINE

## 2017-08-11 RX ORDER — POTASSIUM CHLORIDE 20 MEQ/1
40 TABLET, EXTENDED RELEASE ORAL ONCE
Status: COMPLETED | OUTPATIENT
Start: 2017-08-11 | End: 2017-08-11

## 2017-08-11 RX ADMIN — LOSARTAN POTASSIUM: 50 TABLET, FILM COATED ORAL at 09:41

## 2017-08-11 RX ADMIN — TORSEMIDE 20 MG: 20 TABLET ORAL at 09:42

## 2017-08-11 RX ADMIN — BUSPIRONE HYDROCHLORIDE 15 MG: 10 TABLET ORAL at 09:41

## 2017-08-11 RX ADMIN — ACETAMINOPHEN 650 MG: 325 TABLET, FILM COATED ORAL at 06:12

## 2017-08-11 RX ADMIN — LORATADINE 10 MG: 10 TABLET ORAL at 09:42

## 2017-08-11 RX ADMIN — BUSPIRONE HYDROCHLORIDE 15 MG: 10 TABLET ORAL at 17:21

## 2017-08-11 RX ADMIN — POTASSIUM CHLORIDE 40 MEQ: 1500 TABLET, EXTENDED RELEASE ORAL at 09:45

## 2017-08-11 RX ADMIN — HEPARIN SODIUM 5000 UNITS: 5000 INJECTION, SOLUTION INTRAVENOUS; SUBCUTANEOUS at 09:40

## 2017-08-11 RX ADMIN — CARBAMIDE PEROXIDE 6.5% 5 DROP: 6.5 LIQUID AURICULAR (OTIC) at 09:45

## 2017-08-11 RX ADMIN — TIMOLOL MALEATE 1 DROP: 5 SOLUTION OPHTHALMIC at 09:45

## 2017-08-11 RX ADMIN — QUETIAPINE FUMARATE 12.5 MG: 25 TABLET, FILM COATED ORAL at 09:42

## 2017-08-11 RX ADMIN — QUETIAPINE FUMARATE 75 MG: 25 TABLET, FILM COATED ORAL at 22:40

## 2017-08-11 RX ADMIN — CITALOPRAM HYDROBROMIDE 40 MG: 20 TABLET ORAL at 09:42

## 2017-08-11 RX ADMIN — VITAMIN D, TAB 1000IU (100/BT) 2000 UNITS: 25 TAB at 09:40

## 2017-08-11 RX ADMIN — PANTOPRAZOLE SODIUM 40 MG: 40 TABLET, DELAYED RELEASE ORAL at 06:12

## 2017-08-11 RX ADMIN — BUSPIRONE HYDROCHLORIDE 15 MG: 10 TABLET ORAL at 22:40

## 2017-08-11 RX ADMIN — METOPROLOL SUCCINATE 75 MG: 50 TABLET, EXTENDED RELEASE ORAL at 09:41

## 2017-08-11 RX ADMIN — ACETAMINOPHEN 650 MG: 325 TABLET, FILM COATED ORAL at 10:58

## 2017-08-11 RX ADMIN — LEVOTHYROXINE SODIUM 50 MCG: 50 TABLET ORAL at 06:12

## 2017-08-11 RX ADMIN — HEPARIN SODIUM 5000 UNITS: 5000 INJECTION, SOLUTION INTRAVENOUS; SUBCUTANEOUS at 17:22

## 2017-08-12 ENCOUNTER — APPOINTMENT (INPATIENT)
Dept: RADIOLOGY | Facility: HOSPITAL | Age: 82
DRG: 563 | End: 2017-08-12
Payer: MEDICARE

## 2017-08-12 LAB
ANION GAP SERPL CALCULATED.3IONS-SCNC: 10 MMOL/L (ref 4–13)
BUN SERPL-MCNC: 68 MG/DL (ref 5–25)
CALCIUM SERPL-MCNC: 8.8 MG/DL (ref 8.3–10.1)
CHLORIDE SERPL-SCNC: 99 MMOL/L (ref 100–108)
CO2 SERPL-SCNC: 28 MMOL/L (ref 21–32)
CREAT SERPL-MCNC: 2.72 MG/DL (ref 0.6–1.3)
ERYTHROCYTE [DISTWIDTH] IN BLOOD BY AUTOMATED COUNT: 14.2 % (ref 11.6–15.1)
GFR SERPL CREATININE-BSD FRML MDRD: 15 ML/MIN/1.73SQ M
GLUCOSE SERPL-MCNC: 99 MG/DL (ref 65–140)
HCT VFR BLD AUTO: 23.8 % (ref 34.8–46.1)
HGB BLD-MCNC: 7.6 G/DL (ref 11.5–15.4)
MCH RBC QN AUTO: 27.5 PG (ref 26.8–34.3)
MCHC RBC AUTO-ENTMCNC: 31.9 G/DL (ref 31.4–37.4)
MCV RBC AUTO: 86 FL (ref 82–98)
PLATELET # BLD AUTO: 214 THOUSANDS/UL (ref 149–390)
PMV BLD AUTO: 9.3 FL (ref 8.9–12.7)
POTASSIUM SERPL-SCNC: 3.3 MMOL/L (ref 3.5–5.3)
RBC # BLD AUTO: 2.76 MILLION/UL (ref 3.81–5.12)
SODIUM SERPL-SCNC: 137 MMOL/L (ref 136–145)
WBC # BLD AUTO: 9.18 THOUSAND/UL (ref 4.31–10.16)

## 2017-08-12 PROCEDURE — 85027 COMPLETE CBC AUTOMATED: CPT | Performed by: FAMILY MEDICINE

## 2017-08-12 PROCEDURE — 80048 BASIC METABOLIC PNL TOTAL CA: CPT | Performed by: FAMILY MEDICINE

## 2017-08-12 PROCEDURE — 71020 HB CHEST X-RAY 2VW FRONTAL&LATL: CPT

## 2017-08-12 PROCEDURE — 73130 X-RAY EXAM OF HAND: CPT

## 2017-08-12 RX ORDER — POTASSIUM CHLORIDE 20 MEQ/1
40 TABLET, EXTENDED RELEASE ORAL ONCE
Status: COMPLETED | OUTPATIENT
Start: 2017-08-12 | End: 2017-08-12

## 2017-08-12 RX ADMIN — BUSPIRONE HYDROCHLORIDE 15 MG: 10 TABLET ORAL at 21:52

## 2017-08-12 RX ADMIN — HEPARIN SODIUM 5000 UNITS: 5000 INJECTION, SOLUTION INTRAVENOUS; SUBCUTANEOUS at 23:11

## 2017-08-12 RX ADMIN — HEPARIN SODIUM 5000 UNITS: 5000 INJECTION, SOLUTION INTRAVENOUS; SUBCUTANEOUS at 08:42

## 2017-08-12 RX ADMIN — QUETIAPINE FUMARATE 75 MG: 25 TABLET, FILM COATED ORAL at 21:52

## 2017-08-12 RX ADMIN — QUETIAPINE FUMARATE 12.5 MG: 25 TABLET, FILM COATED ORAL at 08:43

## 2017-08-12 RX ADMIN — CITALOPRAM HYDROBROMIDE 40 MG: 20 TABLET ORAL at 08:44

## 2017-08-12 RX ADMIN — TIMOLOL MALEATE 1 DROP: 5 SOLUTION OPHTHALMIC at 08:44

## 2017-08-12 RX ADMIN — TORSEMIDE 20 MG: 20 TABLET ORAL at 08:44

## 2017-08-12 RX ADMIN — METOPROLOL SUCCINATE 75 MG: 50 TABLET, EXTENDED RELEASE ORAL at 08:44

## 2017-08-12 RX ADMIN — BUSPIRONE HYDROCHLORIDE 15 MG: 10 TABLET ORAL at 08:44

## 2017-08-12 RX ADMIN — HEPARIN SODIUM 5000 UNITS: 5000 INJECTION, SOLUTION INTRAVENOUS; SUBCUTANEOUS at 17:26

## 2017-08-12 RX ADMIN — LEVOTHYROXINE SODIUM 50 MCG: 50 TABLET ORAL at 06:03

## 2017-08-12 RX ADMIN — POTASSIUM CHLORIDE 40 MEQ: 1500 TABLET, EXTENDED RELEASE ORAL at 06:46

## 2017-08-12 RX ADMIN — LORATADINE 10 MG: 10 TABLET ORAL at 08:44

## 2017-08-12 RX ADMIN — ACETAMINOPHEN 650 MG: 325 TABLET, FILM COATED ORAL at 15:41

## 2017-08-12 RX ADMIN — PANTOPRAZOLE SODIUM 40 MG: 40 TABLET, DELAYED RELEASE ORAL at 05:37

## 2017-08-12 RX ADMIN — LOSARTAN POTASSIUM: 50 TABLET, FILM COATED ORAL at 08:42

## 2017-08-12 RX ADMIN — HEPARIN SODIUM 5000 UNITS: 5000 INJECTION, SOLUTION INTRAVENOUS; SUBCUTANEOUS at 00:30

## 2017-08-12 RX ADMIN — VITAMIN D, TAB 1000IU (100/BT) 2000 UNITS: 25 TAB at 08:44

## 2017-08-12 RX ADMIN — BUSPIRONE HYDROCHLORIDE 15 MG: 10 TABLET ORAL at 17:26

## 2017-08-13 PROBLEM — R50.9 FEVER: Status: ACTIVE | Noted: 2017-08-13

## 2017-08-13 LAB
ANION GAP SERPL CALCULATED.3IONS-SCNC: 11 MMOL/L (ref 4–13)
BUN SERPL-MCNC: 68 MG/DL (ref 5–25)
CALCIUM SERPL-MCNC: 8.8 MG/DL (ref 8.3–10.1)
CHLORIDE SERPL-SCNC: 100 MMOL/L (ref 100–108)
CO2 SERPL-SCNC: 27 MMOL/L (ref 21–32)
CREAT SERPL-MCNC: 2.56 MG/DL (ref 0.6–1.3)
ERYTHROCYTE [DISTWIDTH] IN BLOOD BY AUTOMATED COUNT: 14.3 % (ref 11.6–15.1)
FERRITIN SERPL-MCNC: 317 NG/ML (ref 8–388)
GFR SERPL CREATININE-BSD FRML MDRD: 17 ML/MIN/1.73SQ M
GLUCOSE SERPL-MCNC: 98 MG/DL (ref 65–140)
HCT VFR BLD AUTO: 23.1 % (ref 34.8–46.1)
HCT VFR BLD AUTO: 26 % (ref 34.8–46.1)
HGB BLD-MCNC: 7.3 G/DL (ref 11.5–15.4)
HGB BLD-MCNC: 8.2 G/DL (ref 11.5–15.4)
IRON SATN MFR SERPL: 5 %
IRON SERPL-MCNC: 10 UG/DL (ref 50–170)
MCH RBC QN AUTO: 27.4 PG (ref 26.8–34.3)
MCHC RBC AUTO-ENTMCNC: 31.6 G/DL (ref 31.4–37.4)
MCV RBC AUTO: 87 FL (ref 82–98)
PLATELET # BLD AUTO: 241 THOUSANDS/UL (ref 149–390)
PLATELET # BLD AUTO: 271 THOUSANDS/UL (ref 149–390)
PMV BLD AUTO: 9.1 FL (ref 8.9–12.7)
PMV BLD AUTO: 9.5 FL (ref 8.9–12.7)
POTASSIUM SERPL-SCNC: 3.7 MMOL/L (ref 3.5–5.3)
RBC # BLD AUTO: 2.66 MILLION/UL (ref 3.81–5.12)
SODIUM SERPL-SCNC: 138 MMOL/L (ref 136–145)
TIBC SERPL-MCNC: 184 UG/DL (ref 250–450)
WBC # BLD AUTO: 8.52 THOUSAND/UL (ref 4.31–10.16)

## 2017-08-13 PROCEDURE — 82728 ASSAY OF FERRITIN: CPT | Performed by: FAMILY MEDICINE

## 2017-08-13 PROCEDURE — 85049 AUTOMATED PLATELET COUNT: CPT | Performed by: FAMILY MEDICINE

## 2017-08-13 PROCEDURE — 80048 BASIC METABOLIC PNL TOTAL CA: CPT | Performed by: FAMILY MEDICINE

## 2017-08-13 PROCEDURE — 97112 NEUROMUSCULAR REEDUCATION: CPT

## 2017-08-13 PROCEDURE — 83540 ASSAY OF IRON: CPT | Performed by: FAMILY MEDICINE

## 2017-08-13 PROCEDURE — 85018 HEMOGLOBIN: CPT | Performed by: FAMILY MEDICINE

## 2017-08-13 PROCEDURE — 97116 GAIT TRAINING THERAPY: CPT

## 2017-08-13 PROCEDURE — 83550 IRON BINDING TEST: CPT | Performed by: FAMILY MEDICINE

## 2017-08-13 PROCEDURE — 85014 HEMATOCRIT: CPT | Performed by: FAMILY MEDICINE

## 2017-08-13 PROCEDURE — 85027 COMPLETE CBC AUTOMATED: CPT | Performed by: FAMILY MEDICINE

## 2017-08-13 PROCEDURE — 97530 THERAPEUTIC ACTIVITIES: CPT

## 2017-08-13 RX ADMIN — CITALOPRAM HYDROBROMIDE 40 MG: 20 TABLET ORAL at 09:07

## 2017-08-13 RX ADMIN — QUETIAPINE FUMARATE 12.5 MG: 25 TABLET, FILM COATED ORAL at 09:05

## 2017-08-13 RX ADMIN — LOSARTAN POTASSIUM: 50 TABLET, FILM COATED ORAL at 09:06

## 2017-08-13 RX ADMIN — LEVOTHYROXINE SODIUM 50 MCG: 50 TABLET ORAL at 05:55

## 2017-08-13 RX ADMIN — VITAMIN D, TAB 1000IU (100/BT) 2000 UNITS: 25 TAB at 09:06

## 2017-08-13 RX ADMIN — TORSEMIDE 20 MG: 20 TABLET ORAL at 09:06

## 2017-08-13 RX ADMIN — TIMOLOL MALEATE 1 DROP: 5 SOLUTION OPHTHALMIC at 09:06

## 2017-08-13 RX ADMIN — BUSPIRONE HYDROCHLORIDE 15 MG: 10 TABLET ORAL at 09:07

## 2017-08-13 RX ADMIN — BUSPIRONE HYDROCHLORIDE 15 MG: 10 TABLET ORAL at 16:30

## 2017-08-13 RX ADMIN — HEPARIN SODIUM 5000 UNITS: 5000 INJECTION, SOLUTION INTRAVENOUS; SUBCUTANEOUS at 16:30

## 2017-08-13 RX ADMIN — PANTOPRAZOLE SODIUM 40 MG: 40 TABLET, DELAYED RELEASE ORAL at 05:56

## 2017-08-13 RX ADMIN — LORATADINE 10 MG: 10 TABLET ORAL at 09:08

## 2017-08-13 RX ADMIN — METOPROLOL SUCCINATE 75 MG: 50 TABLET, EXTENDED RELEASE ORAL at 09:06

## 2017-08-13 RX ADMIN — QUETIAPINE FUMARATE 75 MG: 25 TABLET, FILM COATED ORAL at 21:27

## 2017-08-13 RX ADMIN — BUSPIRONE HYDROCHLORIDE 15 MG: 10 TABLET ORAL at 21:27

## 2017-08-13 RX ADMIN — ACETAMINOPHEN 650 MG: 325 TABLET, FILM COATED ORAL at 16:30

## 2017-08-13 RX ADMIN — ACETAMINOPHEN 650 MG: 325 TABLET, FILM COATED ORAL at 12:04

## 2017-08-13 RX ADMIN — HEPARIN SODIUM 5000 UNITS: 5000 INJECTION, SOLUTION INTRAVENOUS; SUBCUTANEOUS at 09:07

## 2017-08-13 RX ADMIN — HEPARIN SODIUM 5000 UNITS: 5000 INJECTION, SOLUTION INTRAVENOUS; SUBCUTANEOUS at 23:11

## 2017-08-14 ENCOUNTER — APPOINTMENT (INPATIENT)
Dept: RADIOLOGY | Facility: HOSPITAL | Age: 82
DRG: 563 | End: 2017-08-14
Payer: MEDICARE

## 2017-08-14 LAB
ERYTHROCYTE [DISTWIDTH] IN BLOOD BY AUTOMATED COUNT: 14.3 % (ref 11.6–15.1)
HCT VFR BLD AUTO: 22.1 % (ref 34.8–46.1)
HGB BLD-MCNC: 7 G/DL (ref 11.5–15.4)
MCH RBC QN AUTO: 27.2 PG (ref 26.8–34.3)
MCHC RBC AUTO-ENTMCNC: 31.7 G/DL (ref 31.4–37.4)
MCV RBC AUTO: 86 FL (ref 82–98)
PLATELET # BLD AUTO: 240 THOUSANDS/UL (ref 149–390)
PMV BLD AUTO: 9.1 FL (ref 8.9–12.7)
RBC # BLD AUTO: 2.57 MILLION/UL (ref 3.81–5.12)
WBC # BLD AUTO: 7.83 THOUSAND/UL (ref 4.31–10.16)

## 2017-08-14 PROCEDURE — 97110 THERAPEUTIC EXERCISES: CPT

## 2017-08-14 PROCEDURE — 70450 CT HEAD/BRAIN W/O DYE: CPT

## 2017-08-14 PROCEDURE — 97530 THERAPEUTIC ACTIVITIES: CPT

## 2017-08-14 PROCEDURE — 85027 COMPLETE CBC AUTOMATED: CPT | Performed by: FAMILY MEDICINE

## 2017-08-14 RX ADMIN — BUSPIRONE HYDROCHLORIDE 15 MG: 10 TABLET ORAL at 23:28

## 2017-08-14 RX ADMIN — VITAMIN D, TAB 1000IU (100/BT) 2000 UNITS: 25 TAB at 08:41

## 2017-08-14 RX ADMIN — METOPROLOL SUCCINATE 75 MG: 50 TABLET, EXTENDED RELEASE ORAL at 08:40

## 2017-08-14 RX ADMIN — LEVOTHYROXINE SODIUM 50 MCG: 50 TABLET ORAL at 05:27

## 2017-08-14 RX ADMIN — CITALOPRAM HYDROBROMIDE 40 MG: 20 TABLET ORAL at 08:41

## 2017-08-14 RX ADMIN — HEPARIN SODIUM 5000 UNITS: 5000 INJECTION, SOLUTION INTRAVENOUS; SUBCUTANEOUS at 08:41

## 2017-08-14 RX ADMIN — ACETAMINOPHEN 650 MG: 325 TABLET, FILM COATED ORAL at 00:20

## 2017-08-14 RX ADMIN — HEPARIN SODIUM 5000 UNITS: 5000 INJECTION, SOLUTION INTRAVENOUS; SUBCUTANEOUS at 23:28

## 2017-08-14 RX ADMIN — LORATADINE 10 MG: 10 TABLET ORAL at 08:42

## 2017-08-14 RX ADMIN — BUSPIRONE HYDROCHLORIDE 15 MG: 10 TABLET ORAL at 08:41

## 2017-08-14 RX ADMIN — QUETIAPINE FUMARATE 12.5 MG: 25 TABLET, FILM COATED ORAL at 08:41

## 2017-08-14 RX ADMIN — IRON SUCROSE 300 MG: 20 INJECTION, SOLUTION INTRAVENOUS at 10:38

## 2017-08-14 RX ADMIN — HEPARIN SODIUM 5000 UNITS: 5000 INJECTION, SOLUTION INTRAVENOUS; SUBCUTANEOUS at 17:27

## 2017-08-14 RX ADMIN — BUSPIRONE HYDROCHLORIDE 15 MG: 10 TABLET ORAL at 15:22

## 2017-08-14 RX ADMIN — PANTOPRAZOLE SODIUM 40 MG: 40 TABLET, DELAYED RELEASE ORAL at 05:28

## 2017-08-14 RX ADMIN — TIMOLOL MALEATE 1 DROP: 5 SOLUTION OPHTHALMIC at 08:43

## 2017-08-14 RX ADMIN — TORSEMIDE 20 MG: 20 TABLET ORAL at 08:41

## 2017-08-14 RX ADMIN — LOSARTAN POTASSIUM: 50 TABLET, FILM COATED ORAL at 08:40

## 2017-08-14 RX ADMIN — QUETIAPINE FUMARATE 75 MG: 25 TABLET, FILM COATED ORAL at 23:29

## 2017-08-14 RX ADMIN — CARBAMIDE PEROXIDE 6.5% 5 DROP: 6.5 LIQUID AURICULAR (OTIC) at 08:43

## 2017-08-15 LAB
ANION GAP SERPL CALCULATED.3IONS-SCNC: 10 MMOL/L (ref 4–13)
BUN SERPL-MCNC: 72 MG/DL (ref 5–25)
CALCIUM SERPL-MCNC: 9.1 MG/DL (ref 8.3–10.1)
CHLORIDE SERPL-SCNC: 101 MMOL/L (ref 100–108)
CO2 SERPL-SCNC: 28 MMOL/L (ref 21–32)
CREAT SERPL-MCNC: 2.39 MG/DL (ref 0.6–1.3)
ERYTHROCYTE [DISTWIDTH] IN BLOOD BY AUTOMATED COUNT: 14.1 % (ref 11.6–15.1)
GFR SERPL CREATININE-BSD FRML MDRD: 18 ML/MIN/1.73SQ M
GLUCOSE SERPL-MCNC: 92 MG/DL (ref 65–140)
HCT VFR BLD AUTO: 24.4 % (ref 34.8–46.1)
HGB BLD-MCNC: 7.6 G/DL (ref 11.5–15.4)
MCH RBC QN AUTO: 27.1 PG (ref 26.8–34.3)
MCHC RBC AUTO-ENTMCNC: 31.1 G/DL (ref 31.4–37.4)
MCV RBC AUTO: 87 FL (ref 82–98)
PLATELET # BLD AUTO: 294 THOUSANDS/UL (ref 149–390)
PMV BLD AUTO: 9.5 FL (ref 8.9–12.7)
POTASSIUM SERPL-SCNC: 3.8 MMOL/L (ref 3.5–5.3)
RBC # BLD AUTO: 2.8 MILLION/UL (ref 3.81–5.12)
SODIUM SERPL-SCNC: 139 MMOL/L (ref 136–145)
WBC # BLD AUTO: 6.54 THOUSAND/UL (ref 4.31–10.16)

## 2017-08-15 PROCEDURE — 80048 BASIC METABOLIC PNL TOTAL CA: CPT | Performed by: FAMILY MEDICINE

## 2017-08-15 PROCEDURE — 85027 COMPLETE CBC AUTOMATED: CPT | Performed by: FAMILY MEDICINE

## 2017-08-15 PROCEDURE — 97110 THERAPEUTIC EXERCISES: CPT

## 2017-08-15 RX ORDER — DOCUSATE SODIUM 100 MG/1
100 CAPSULE, LIQUID FILLED ORAL 2 TIMES DAILY
Status: DISCONTINUED | OUTPATIENT
Start: 2017-08-15 | End: 2017-08-18 | Stop reason: HOSPADM

## 2017-08-15 RX ORDER — POLYETHYLENE GLYCOL 3350 17 G/17G
17 POWDER, FOR SOLUTION ORAL DAILY
Status: DISCONTINUED | OUTPATIENT
Start: 2017-08-15 | End: 2017-08-18 | Stop reason: HOSPADM

## 2017-08-15 RX ORDER — SODIUM CHLORIDE 9 MG/ML
30 INJECTION, SOLUTION INTRAVENOUS CONTINUOUS
Status: DISCONTINUED | OUTPATIENT
Start: 2017-08-15 | End: 2017-08-17

## 2017-08-15 RX ADMIN — BUSPIRONE HYDROCHLORIDE 15 MG: 10 TABLET ORAL at 08:10

## 2017-08-15 RX ADMIN — HEPARIN SODIUM 5000 UNITS: 5000 INJECTION, SOLUTION INTRAVENOUS; SUBCUTANEOUS at 08:18

## 2017-08-15 RX ADMIN — LORATADINE 10 MG: 10 TABLET ORAL at 08:09

## 2017-08-15 RX ADMIN — QUETIAPINE FUMARATE 75 MG: 25 TABLET, FILM COATED ORAL at 22:04

## 2017-08-15 RX ADMIN — CITALOPRAM HYDROBROMIDE 40 MG: 20 TABLET ORAL at 08:10

## 2017-08-15 RX ADMIN — TIMOLOL MALEATE 1 DROP: 5 SOLUTION OPHTHALMIC at 08:14

## 2017-08-15 RX ADMIN — LEVOTHYROXINE SODIUM 50 MCG: 50 TABLET ORAL at 06:18

## 2017-08-15 RX ADMIN — HEPARIN SODIUM 5000 UNITS: 5000 INJECTION, SOLUTION INTRAVENOUS; SUBCUTANEOUS at 16:38

## 2017-08-15 RX ADMIN — TORSEMIDE 20 MG: 20 TABLET ORAL at 08:10

## 2017-08-15 RX ADMIN — POLYETHYLENE GLYCOL 3350 17 G: 17 POWDER, FOR SOLUTION ORAL at 13:14

## 2017-08-15 RX ADMIN — SODIUM CHLORIDE 50 ML/HR: 0.9 INJECTION, SOLUTION INTRAVENOUS at 14:29

## 2017-08-15 RX ADMIN — LOSARTAN POTASSIUM: 50 TABLET, FILM COATED ORAL at 08:09

## 2017-08-15 RX ADMIN — METOPROLOL SUCCINATE 75 MG: 50 TABLET, EXTENDED RELEASE ORAL at 08:09

## 2017-08-15 RX ADMIN — PANTOPRAZOLE SODIUM 40 MG: 40 TABLET, DELAYED RELEASE ORAL at 06:18

## 2017-08-15 RX ADMIN — VITAMIN D, TAB 1000IU (100/BT) 2000 UNITS: 25 TAB at 08:09

## 2017-08-15 RX ADMIN — HEPARIN SODIUM 5000 UNITS: 5000 INJECTION, SOLUTION INTRAVENOUS; SUBCUTANEOUS at 23:50

## 2017-08-15 RX ADMIN — QUETIAPINE FUMARATE 12.5 MG: 25 TABLET, FILM COATED ORAL at 08:09

## 2017-08-15 RX ADMIN — DOCUSATE SODIUM 100 MG: 100 CAPSULE, LIQUID FILLED ORAL at 13:14

## 2017-08-15 RX ADMIN — IRON SUCROSE 300 MG: 20 INJECTION, SOLUTION INTRAVENOUS at 16:38

## 2017-08-16 ENCOUNTER — APPOINTMENT (INPATIENT)
Dept: OCCUPATIONAL THERAPY | Facility: HOSPITAL | Age: 82
DRG: 563 | End: 2017-08-16
Payer: MEDICARE

## 2017-08-16 LAB
ANION GAP SERPL CALCULATED.3IONS-SCNC: 7 MMOL/L (ref 4–13)
BACTERIA UR QL AUTO: ABNORMAL /HPF
BASOPHILS # BLD MANUAL: 0 THOUSAND/UL (ref 0–0.1)
BASOPHILS NFR MAR MANUAL: 0 % (ref 0–1)
BILIRUB UR QL STRIP: NEGATIVE
BUN SERPL-MCNC: 68 MG/DL (ref 5–25)
CALCIUM SERPL-MCNC: 9.1 MG/DL (ref 8.3–10.1)
CHLORIDE SERPL-SCNC: 104 MMOL/L (ref 100–108)
CLARITY UR: ABNORMAL
CO2 SERPL-SCNC: 30 MMOL/L (ref 21–32)
COLOR UR: ABNORMAL
CREAT SERPL-MCNC: 2.09 MG/DL (ref 0.6–1.3)
EOSINOPHIL # BLD MANUAL: 0 THOUSAND/UL (ref 0–0.4)
EOSINOPHIL NFR BLD MANUAL: 0 % (ref 0–6)
ERYTHROCYTE [DISTWIDTH] IN BLOOD BY AUTOMATED COUNT: 14.4 % (ref 11.6–15.1)
GFR SERPL CREATININE-BSD FRML MDRD: 21 ML/MIN/1.73SQ M
GLUCOSE SERPL-MCNC: 95 MG/DL (ref 65–140)
GLUCOSE UR STRIP-MCNC: NEGATIVE MG/DL
HCT VFR BLD AUTO: 23.7 % (ref 34.8–46.1)
HEMOCCULT STL QL: NEGATIVE
HGB BLD-MCNC: 7.4 G/DL (ref 11.5–15.4)
HGB UR QL STRIP.AUTO: ABNORMAL
HYPERCHROMIA BLD QL SMEAR: PRESENT
KETONES UR STRIP-MCNC: NEGATIVE MG/DL
LEUKOCYTE ESTERASE UR QL STRIP: ABNORMAL
LYMPHOCYTES # BLD AUTO: 1.1 THOUSAND/UL (ref 0.6–4.47)
LYMPHOCYTES # BLD AUTO: 18 % (ref 14–44)
MAGNESIUM SERPL-MCNC: 1.8 MG/DL (ref 1.6–2.6)
MCH RBC QN AUTO: 26.9 PG (ref 26.8–34.3)
MCHC RBC AUTO-ENTMCNC: 31.2 G/DL (ref 31.4–37.4)
MCV RBC AUTO: 86 FL (ref 82–98)
METAMYELOCYTES NFR BLD MANUAL: 1 % (ref 0–1)
MONOCYTES # BLD AUTO: 0.92 THOUSAND/UL (ref 0–1.22)
MONOCYTES NFR BLD: 15 % (ref 4–12)
MYELOCYTES NFR BLD MANUAL: 1 % (ref 0–1)
NEUTROPHILS # BLD MANUAL: 3.98 THOUSAND/UL (ref 1.85–7.62)
NEUTS BAND NFR BLD MANUAL: 1 % (ref 0–8)
NEUTS SEG NFR BLD AUTO: 64 % (ref 43–75)
NITRITE UR QL STRIP: NEGATIVE
NON-SQ EPI CELLS URNS QL MICRO: ABNORMAL /HPF
NRBC BLD AUTO-RTO: 0 /100 WBCS
PH UR STRIP.AUTO: 6.5 [PH] (ref 4.5–8)
PHOSPHATE SERPL-MCNC: 3.4 MG/DL (ref 2.3–4.1)
PLATELET # BLD AUTO: 305 THOUSANDS/UL (ref 149–390)
PLATELET BLD QL SMEAR: ADEQUATE
PMV BLD AUTO: 9.1 FL (ref 8.9–12.7)
POTASSIUM SERPL-SCNC: 3.9 MMOL/L (ref 3.5–5.3)
PROT UR STRIP-MCNC: ABNORMAL MG/DL
RBC # BLD AUTO: 2.75 MILLION/UL (ref 3.81–5.12)
RBC #/AREA URNS AUTO: ABNORMAL /HPF
SODIUM SERPL-SCNC: 141 MMOL/L (ref 136–145)
SP GR UR STRIP.AUTO: 1.01 (ref 1–1.03)
TOTAL CELLS COUNTED SPEC: 100
UROBILINOGEN UR QL STRIP.AUTO: 1 E.U./DL
WBC # BLD AUTO: 6.12 THOUSAND/UL (ref 4.31–10.16)
WBC #/AREA URNS AUTO: ABNORMAL /HPF

## 2017-08-16 PROCEDURE — 85007 BL SMEAR W/DIFF WBC COUNT: CPT | Performed by: INTERNAL MEDICINE

## 2017-08-16 PROCEDURE — 97535 SELF CARE MNGMENT TRAINING: CPT

## 2017-08-16 PROCEDURE — 82272 OCCULT BLD FECES 1-3 TESTS: CPT | Performed by: INTERNAL MEDICINE

## 2017-08-16 PROCEDURE — 84100 ASSAY OF PHOSPHORUS: CPT | Performed by: NURSE PRACTITIONER

## 2017-08-16 PROCEDURE — 85027 COMPLETE CBC AUTOMATED: CPT | Performed by: INTERNAL MEDICINE

## 2017-08-16 PROCEDURE — 84166 PROTEIN E-PHORESIS/URINE/CSF: CPT | Performed by: INTERNAL MEDICINE

## 2017-08-16 PROCEDURE — 83735 ASSAY OF MAGNESIUM: CPT | Performed by: NURSE PRACTITIONER

## 2017-08-16 PROCEDURE — 97112 NEUROMUSCULAR REEDUCATION: CPT

## 2017-08-16 PROCEDURE — 80048 BASIC METABOLIC PNL TOTAL CA: CPT | Performed by: INTERNAL MEDICINE

## 2017-08-16 PROCEDURE — 97110 THERAPEUTIC EXERCISES: CPT

## 2017-08-16 PROCEDURE — 84165 PROTEIN E-PHORESIS SERUM: CPT | Performed by: INTERNAL MEDICINE

## 2017-08-16 PROCEDURE — 81001 URINALYSIS AUTO W/SCOPE: CPT | Performed by: NURSE PRACTITIONER

## 2017-08-16 PROCEDURE — 97530 THERAPEUTIC ACTIVITIES: CPT

## 2017-08-16 RX ADMIN — POLYETHYLENE GLYCOL 3350 17 G: 17 POWDER, FOR SOLUTION ORAL at 09:18

## 2017-08-16 RX ADMIN — VITAMIN D, TAB 1000IU (100/BT) 2000 UNITS: 25 TAB at 09:18

## 2017-08-16 RX ADMIN — HEPARIN SODIUM 5000 UNITS: 5000 INJECTION, SOLUTION INTRAVENOUS; SUBCUTANEOUS at 09:23

## 2017-08-16 RX ADMIN — LEVOTHYROXINE SODIUM 50 MCG: 50 TABLET ORAL at 06:17

## 2017-08-16 RX ADMIN — QUETIAPINE FUMARATE 12.5 MG: 25 TABLET, FILM COATED ORAL at 09:19

## 2017-08-16 RX ADMIN — PANTOPRAZOLE SODIUM 40 MG: 40 TABLET, DELAYED RELEASE ORAL at 06:17

## 2017-08-16 RX ADMIN — DOCUSATE SODIUM 100 MG: 100 CAPSULE, LIQUID FILLED ORAL at 18:00

## 2017-08-16 RX ADMIN — TIMOLOL MALEATE 1 DROP: 5 SOLUTION OPHTHALMIC at 09:20

## 2017-08-16 RX ADMIN — CITALOPRAM HYDROBROMIDE 40 MG: 20 TABLET ORAL at 09:19

## 2017-08-16 RX ADMIN — HEPARIN SODIUM 5000 UNITS: 5000 INJECTION, SOLUTION INTRAVENOUS; SUBCUTANEOUS at 18:00

## 2017-08-16 RX ADMIN — LORATADINE 10 MG: 10 TABLET ORAL at 09:19

## 2017-08-16 RX ADMIN — CARBAMIDE PEROXIDE 6.5% 5 DROP: 6.5 LIQUID AURICULAR (OTIC) at 09:20

## 2017-08-16 RX ADMIN — METOPROLOL SUCCINATE 75 MG: 50 TABLET, EXTENDED RELEASE ORAL at 09:19

## 2017-08-16 RX ADMIN — DOCUSATE SODIUM 100 MG: 100 CAPSULE, LIQUID FILLED ORAL at 09:19

## 2017-08-16 RX ADMIN — ACETAMINOPHEN 650 MG: 325 TABLET, FILM COATED ORAL at 17:59

## 2017-08-16 RX ADMIN — IRON SUCROSE 300 MG: 20 INJECTION, SOLUTION INTRAVENOUS at 10:46

## 2017-08-16 RX ADMIN — TORSEMIDE 20 MG: 20 TABLET ORAL at 09:19

## 2017-08-17 LAB
ANION GAP SERPL CALCULATED.3IONS-SCNC: 6 MMOL/L (ref 4–13)
BASOPHILS # BLD MANUAL: 0 THOUSAND/UL (ref 0–0.1)
BASOPHILS NFR MAR MANUAL: 0 % (ref 0–1)
BUN SERPL-MCNC: 61 MG/DL (ref 5–25)
CALCIUM SERPL-MCNC: 8.9 MG/DL (ref 8.3–10.1)
CHLORIDE SERPL-SCNC: 104 MMOL/L (ref 100–108)
CO2 SERPL-SCNC: 33 MMOL/L (ref 21–32)
CREAT SERPL-MCNC: 1.95 MG/DL (ref 0.6–1.3)
EOSINOPHIL # BLD MANUAL: 0 THOUSAND/UL (ref 0–0.4)
EOSINOPHIL NFR BLD MANUAL: 0 % (ref 0–6)
ERYTHROCYTE [DISTWIDTH] IN BLOOD BY AUTOMATED COUNT: 14.3 % (ref 11.6–15.1)
GFR SERPL CREATININE-BSD FRML MDRD: 23 ML/MIN/1.73SQ M
GLUCOSE SERPL-MCNC: 101 MG/DL (ref 65–140)
HCT VFR BLD AUTO: 25.3 % (ref 34.8–46.1)
HGB BLD-MCNC: 7.7 G/DL (ref 11.5–15.4)
HYPERCHROMIA BLD QL SMEAR: PRESENT
LYMPHOCYTES # BLD AUTO: 0.75 THOUSAND/UL (ref 0.6–4.47)
LYMPHOCYTES # BLD AUTO: 13 % (ref 14–44)
MCH RBC QN AUTO: 26.8 PG (ref 26.8–34.3)
MCHC RBC AUTO-ENTMCNC: 30.4 G/DL (ref 31.4–37.4)
MCV RBC AUTO: 88 FL (ref 82–98)
METAMYELOCYTES NFR BLD MANUAL: 4 % (ref 0–1)
MONOCYTES # BLD AUTO: 0.52 THOUSAND/UL (ref 0–1.22)
MONOCYTES NFR BLD: 9 % (ref 4–12)
MYELOCYTES NFR BLD MANUAL: 2 % (ref 0–1)
NEUTROPHILS # BLD MANUAL: 4.18 THOUSAND/UL (ref 1.85–7.62)
NEUTS SEG NFR BLD AUTO: 72 % (ref 43–75)
NRBC BLD AUTO-RTO: 0 /100 WBCS
PLATELET # BLD AUTO: 311 THOUSANDS/UL (ref 149–390)
PLATELET BLD QL SMEAR: ADEQUATE
PMV BLD AUTO: 9 FL (ref 8.9–12.7)
POTASSIUM SERPL-SCNC: 4.1 MMOL/L (ref 3.5–5.3)
RBC # BLD AUTO: 2.87 MILLION/UL (ref 3.81–5.12)
RBC MORPH BLD: PRESENT
SODIUM SERPL-SCNC: 143 MMOL/L (ref 136–145)
WBC # BLD AUTO: 5.8 THOUSAND/UL (ref 4.31–10.16)

## 2017-08-17 PROCEDURE — 85007 BL SMEAR W/DIFF WBC COUNT: CPT | Performed by: INTERNAL MEDICINE

## 2017-08-17 PROCEDURE — 80048 BASIC METABOLIC PNL TOTAL CA: CPT | Performed by: INTERNAL MEDICINE

## 2017-08-17 PROCEDURE — 97530 THERAPEUTIC ACTIVITIES: CPT

## 2017-08-17 PROCEDURE — 85027 COMPLETE CBC AUTOMATED: CPT | Performed by: INTERNAL MEDICINE

## 2017-08-17 RX ORDER — SENNOSIDES 8.6 MG
2 TABLET ORAL
Status: DISCONTINUED | OUTPATIENT
Start: 2017-08-17 | End: 2017-08-18 | Stop reason: HOSPADM

## 2017-08-17 RX ADMIN — TIMOLOL MALEATE 1 DROP: 5 SOLUTION OPHTHALMIC at 08:59

## 2017-08-17 RX ADMIN — DOCUSATE SODIUM 100 MG: 100 CAPSULE, LIQUID FILLED ORAL at 17:00

## 2017-08-17 RX ADMIN — SENNOSIDES 8.6 MG: 8.6 TABLET, FILM COATED ORAL at 22:09

## 2017-08-17 RX ADMIN — HEPARIN SODIUM 5000 UNITS: 5000 INJECTION, SOLUTION INTRAVENOUS; SUBCUTANEOUS at 08:57

## 2017-08-17 RX ADMIN — QUETIAPINE FUMARATE 12.5 MG: 25 TABLET, FILM COATED ORAL at 08:58

## 2017-08-17 RX ADMIN — QUETIAPINE FUMARATE 75 MG: 25 TABLET, FILM COATED ORAL at 00:54

## 2017-08-17 RX ADMIN — HEPARIN SODIUM 5000 UNITS: 5000 INJECTION, SOLUTION INTRAVENOUS; SUBCUTANEOUS at 00:54

## 2017-08-17 RX ADMIN — DOCUSATE SODIUM 100 MG: 100 CAPSULE, LIQUID FILLED ORAL at 08:57

## 2017-08-17 RX ADMIN — HEPARIN SODIUM 5000 UNITS: 5000 INJECTION, SOLUTION INTRAVENOUS; SUBCUTANEOUS at 22:09

## 2017-08-17 RX ADMIN — ACETAMINOPHEN 650 MG: 325 TABLET, FILM COATED ORAL at 22:09

## 2017-08-17 RX ADMIN — METOPROLOL SUCCINATE 75 MG: 50 TABLET, EXTENDED RELEASE ORAL at 08:57

## 2017-08-17 RX ADMIN — POLYETHYLENE GLYCOL 3350 17 G: 17 POWDER, FOR SOLUTION ORAL at 08:59

## 2017-08-17 RX ADMIN — LEVOTHYROXINE SODIUM 50 MCG: 50 TABLET ORAL at 06:33

## 2017-08-17 RX ADMIN — VITAMIN D, TAB 1000IU (100/BT) 2000 UNITS: 25 TAB at 08:58

## 2017-08-17 RX ADMIN — HEPARIN SODIUM 5000 UNITS: 5000 INJECTION, SOLUTION INTRAVENOUS; SUBCUTANEOUS at 16:57

## 2017-08-17 RX ADMIN — CITALOPRAM HYDROBROMIDE 40 MG: 20 TABLET ORAL at 08:58

## 2017-08-17 RX ADMIN — PANTOPRAZOLE SODIUM 40 MG: 40 TABLET, DELAYED RELEASE ORAL at 06:32

## 2017-08-17 RX ADMIN — QUETIAPINE FUMARATE 75 MG: 25 TABLET, FILM COATED ORAL at 22:09

## 2017-08-17 RX ADMIN — TORSEMIDE 20 MG: 20 TABLET ORAL at 08:57

## 2017-08-17 RX ADMIN — LORATADINE 10 MG: 10 TABLET ORAL at 08:57

## 2017-08-18 VITALS
TEMPERATURE: 98.4 F | BODY MASS INDEX: 43.91 KG/M2 | WEIGHT: 247.8 LBS | SYSTOLIC BLOOD PRESSURE: 170 MMHG | RESPIRATION RATE: 18 BRPM | OXYGEN SATURATION: 97 % | DIASTOLIC BLOOD PRESSURE: 73 MMHG | HEART RATE: 73 BPM | HEIGHT: 63 IN

## 2017-08-18 PROBLEM — R50.9 FEVER: Status: RESOLVED | Noted: 2017-08-13 | Resolved: 2017-08-18

## 2017-08-18 LAB
ALBUMIN SERPL ELPH-MCNC: 2.43 G/DL (ref 3.5–5)
ALBUMIN SERPL ELPH-MCNC: 44.2 % (ref 52–65)
ALBUMIN UR ELPH-MCNC: 100 %
ALPHA1 GLOB MFR UR ELPH: 0 %
ALPHA1 GLOB SERPL ELPH-MCNC: 0.7 G/DL (ref 0.1–0.4)
ALPHA1 GLOB SERPL ELPH-MCNC: 12.8 % (ref 2.5–5)
ALPHA2 GLOB MFR UR ELPH: 0 %
ALPHA2 GLOB SERPL ELPH-MCNC: 0.96 G/DL (ref 0.4–1.2)
ALPHA2 GLOB SERPL ELPH-MCNC: 17.5 % (ref 7–13)
ANION GAP SERPL CALCULATED.3IONS-SCNC: 7 MMOL/L (ref 4–13)
B-GLOBULIN MFR UR ELPH: 0 %
BETA GLOB ABNORMAL SERPL ELPH-MCNC: 0.35 G/DL (ref 0.4–0.8)
BETA1 GLOB SERPL ELPH-MCNC: 6.4 % (ref 5–13)
BETA2 GLOB SERPL ELPH-MCNC: 5.9 % (ref 2–8)
BETA2+GAMMA GLOB SERPL ELPH-MCNC: 0.32 G/DL (ref 0.2–0.5)
BUN SERPL-MCNC: 54 MG/DL (ref 5–25)
CALCIUM SERPL-MCNC: 9.1 MG/DL (ref 8.3–10.1)
CHLORIDE SERPL-SCNC: 103 MMOL/L (ref 100–108)
CO2 SERPL-SCNC: 31 MMOL/L (ref 21–32)
CREAT SERPL-MCNC: 1.85 MG/DL (ref 0.6–1.3)
GAMMA GLOB ABNORMAL SERPL ELPH-MCNC: 0.73 G/DL (ref 0.5–1.6)
GAMMA GLOB MFR UR ELPH: 0 %
GAMMA GLOB SERPL ELPH-MCNC: 13.2 % (ref 12–22)
GFR SERPL CREATININE-BSD FRML MDRD: 24 ML/MIN/1.73SQ M
GLUCOSE SERPL-MCNC: 86 MG/DL (ref 65–140)
IGG/ALB SER: 0.79 {RATIO} (ref 1.1–1.8)
POTASSIUM SERPL-SCNC: 3.9 MMOL/L (ref 3.5–5.3)
PROT PATTERN UR ELPH-IMP: ABNORMAL
PROT SERPL-MCNC: 5.5 G/DL (ref 6.4–8.2)
PROT UR-MCNC: 30 MG/DL
SODIUM SERPL-SCNC: 141 MMOL/L (ref 136–145)

## 2017-08-18 PROCEDURE — 97535 SELF CARE MNGMENT TRAINING: CPT

## 2017-08-18 PROCEDURE — 80048 BASIC METABOLIC PNL TOTAL CA: CPT | Performed by: INTERNAL MEDICINE

## 2017-08-18 RX ORDER — DOCUSATE SODIUM 100 MG/1
100 CAPSULE, LIQUID FILLED ORAL 2 TIMES DAILY
Qty: 10 CAPSULE | Refills: 0
Start: 2017-08-18 | End: 2020-05-12 | Stop reason: ALTCHOICE

## 2017-08-18 RX ORDER — ACETAMINOPHEN 325 MG/1
650 TABLET ORAL EVERY 6 HOURS PRN
Qty: 30 TABLET | Refills: 0
Start: 2017-08-18

## 2017-08-18 RX ORDER — SENNOSIDES 8.6 MG
2 TABLET ORAL
Qty: 120 EACH | Refills: 0
Start: 2017-08-18 | End: 2020-06-05 | Stop reason: ALTCHOICE

## 2017-08-18 RX ORDER — TORSEMIDE 20 MG/1
20 TABLET ORAL DAILY
Refills: 0
Start: 2017-08-18

## 2017-08-18 RX ORDER — POLYETHYLENE GLYCOL 3350 17 G/17G
17 POWDER, FOR SOLUTION ORAL DAILY
Qty: 14 EACH | Refills: 0
Start: 2017-08-18 | End: 2018-04-09 | Stop reason: ALTCHOICE

## 2017-08-18 RX ORDER — LOSARTAN POTASSIUM 25 MG/1
25 TABLET ORAL DAILY
Refills: 0
Start: 2017-08-18 | End: 2020-05-12

## 2017-08-18 RX ORDER — TIMOLOL MALEATE 5 MG/ML
1 SOLUTION/ DROPS OPHTHALMIC DAILY
Qty: 10 ML | Refills: 0
Start: 2017-08-18 | End: 2018-04-09 | Stop reason: ALTCHOICE

## 2017-08-18 RX ADMIN — CITALOPRAM HYDROBROMIDE 40 MG: 20 TABLET ORAL at 09:54

## 2017-08-18 RX ADMIN — QUETIAPINE FUMARATE 12.5 MG: 25 TABLET, FILM COATED ORAL at 09:55

## 2017-08-18 RX ADMIN — LEVOTHYROXINE SODIUM 50 MCG: 50 TABLET ORAL at 06:11

## 2017-08-18 RX ADMIN — POLYETHYLENE GLYCOL 3350 17 G: 17 POWDER, FOR SOLUTION ORAL at 09:53

## 2017-08-18 RX ADMIN — TORSEMIDE 20 MG: 20 TABLET ORAL at 09:54

## 2017-08-18 RX ADMIN — ACETAMINOPHEN 650 MG: 325 TABLET, FILM COATED ORAL at 06:10

## 2017-08-18 RX ADMIN — LORATADINE 10 MG: 10 TABLET ORAL at 10:01

## 2017-08-18 RX ADMIN — DOCUSATE SODIUM 100 MG: 100 CAPSULE, LIQUID FILLED ORAL at 09:54

## 2017-08-18 RX ADMIN — METOPROLOL SUCCINATE 75 MG: 50 TABLET, EXTENDED RELEASE ORAL at 09:54

## 2017-08-18 RX ADMIN — PANTOPRAZOLE SODIUM 40 MG: 40 TABLET, DELAYED RELEASE ORAL at 06:11

## 2017-08-18 RX ADMIN — VITAMIN D, TAB 1000IU (100/BT) 2000 UNITS: 25 TAB at 09:54

## 2017-08-18 RX ADMIN — CARBAMIDE PEROXIDE 6.5% 5 DROP: 6.5 LIQUID AURICULAR (OTIC) at 09:56

## 2017-08-18 RX ADMIN — HEPARIN SODIUM 5000 UNITS: 5000 INJECTION, SOLUTION INTRAVENOUS; SUBCUTANEOUS at 09:53

## 2017-08-18 RX ADMIN — TIMOLOL MALEATE 1 DROP: 5 SOLUTION OPHTHALMIC at 09:55

## 2017-08-22 DIAGNOSIS — D18.1 LYMPHANGIOMA, ANY SITE: ICD-10-CM

## 2017-08-22 DIAGNOSIS — N18.9 CHRONIC KIDNEY DISEASE: ICD-10-CM

## 2017-08-22 DIAGNOSIS — N18.4 CHRONIC KIDNEY DISEASE, STAGE IV (SEVERE) (HCC): ICD-10-CM

## 2017-09-01 ENCOUNTER — ALLSCRIPTS OFFICE VISIT (OUTPATIENT)
Dept: OTHER | Facility: OTHER | Age: 82
End: 2017-09-01

## 2017-09-06 ENCOUNTER — TRANSCRIBE ORDERS (OUTPATIENT)
Dept: RADIOLOGY | Facility: HOSPITAL | Age: 82
End: 2017-09-06

## 2017-09-06 ENCOUNTER — HOSPITAL ENCOUNTER (OUTPATIENT)
Dept: RADIOLOGY | Facility: HOSPITAL | Age: 82
Discharge: HOME | End: 2017-09-06
Payer: MEDICARE

## 2017-09-06 DIAGNOSIS — G96.08 SUBDURAL HYGROMA: ICD-10-CM

## 2017-09-11 ENCOUNTER — HOSPITAL ENCOUNTER (OUTPATIENT)
Dept: RADIOLOGY | Age: 82
Discharge: HOME | End: 2017-09-11
Payer: MEDICARE

## 2017-09-11 DIAGNOSIS — D18.1 LYMPHANGIOMA, ANY SITE: ICD-10-CM

## 2017-09-11 PROCEDURE — 70450 CT HEAD/BRAIN W/O DYE: CPT

## 2017-09-12 ENCOUNTER — GENERIC CONVERSION - ENCOUNTER (OUTPATIENT)
Dept: OTHER | Facility: OTHER | Age: 82
End: 2017-09-12

## 2017-10-09 ENCOUNTER — ALLSCRIPTS OFFICE VISIT (OUTPATIENT)
Dept: OTHER | Facility: OTHER | Age: 82
End: 2017-10-09

## 2017-10-14 NOTE — PROGRESS NOTES
Assessment  1  Cough (786 2) (R05)   2  Bronchospasm (519 11) (J98 01)    Results/Data  PFT Results v2:     Spirometry: Forced vital capacity: 71%% Predicted Values  Forced expiratory volume in one second: 76%% Predicted Value  FEV1/FVC ratio is 78%% Predicted Values  Post Bronchodilator Spirometry:   Lung Volumes:   RV: 95% Predicted Values  DLCO:   DLCO (Hgb Oscar) 35% Predicted Values   PFT Interpretation:   Restriction with + BD Response, and Severely Decreased DLCO  Discussion/Summary  Discussion Summary:   Ms Nicki Mishra to the office today for Hospital Follow up  She is doing quite well from a Pulmonary perspective  She feels that her breathing is at her baseline  She does not report using any inhalers or nebulizers on a daily basis  She is residing at [facility] and wheel chair bound since her fall and broken toe  She is undergoing PT/OT  I recommend that she continue to work with them  We can continue to monitor her off any nebulizers or inhalers and use Albuterol or Ventolin on a PRN basis  She can follow up with us in the Pulmonary office on an As needed basis at this time  She knows to call with any questions or concerns  She verbalized understanding and agreement with this plan  Counseling Documentation With Imm: The patient was counseled regarding  Patient's Capacity to Self-Care: Patient is able to Self-Care  Medication SE Review and Pt Understands Tx: Possible side effects of new medications were reviewed with the patient/guardian today  Active Problems  1  Anemia of chronic disease (285 29) (D63 8)   2  Aortic stenosis (424 1) (I35 0)   3  Atopic dermatitis (691 8) (L20 9)   4  Bilateral impacted cerumen (380 4) (H61 23)   5  Bronchospasm (519 11) (J98 01)   6  CAP (community acquired pneumonia) (5) (J18 9)   7  Chronic kidney disease (585 9) (N18 9)   8  CKD (chronic kidney disease), stage IV (585 4) (N18 4)   9  Cough (786 2) (R05)   10   Dry eye (375 15) (H04 129)   11  Dysthymic disorder (300 4) (F34 1)   12  Edema (782 3) (R60 9)   13  Esophageal reflux (530 81) (K21 9)   14  EROS (generalized anxiety disorder) (300 02) (F41 1)   15  Generalized osteoarthritis (715 00) (M15 9)   16  Generalized pain (780 96) (R52)   17  Glaucoma (365 9) (H40 9)   18  Hypertension (401 9) (I10)   19  Hypothyroidism (244 9) (E03 9)   20  Lumbar canal stenosis (724 02) (M48 061)   21  Major depressive disorder, recurrent, in full remission (296 36) (F33 42)   22  Nocturnal hypoxia (327 24) (G47 34)   23  Renal cyst (753 10) (N28 1)   24  Subdural hygroma (432 1) (D18 1)   25  Venous insufficiency (chronic) (peripheral) (459 81) (I87 2)   26  Vitamin D deficiency (268 9) (E55 9)    Chief Complaint  Chief Complaint Free Text Note Form: Hospital Follow up      History of Present Illness  HPI: Ms Loco Yan presents to the office today for Hospital Follow up  She was in the Hospital in June and seen by the Pulmonary Team for bronchospasm and chronic cough  She was given steroids and nebulizers and improved  She was sent back to [place] and unfortunately fell while in the shower and broke her toe  She was back in the hospital for a few weeks  Since her discharges she has been doing well from a Pulmonary Perspective  She denies any coughing, wheezing, chest tightness, SOB, PALENCIA, diaphoresis, fevers, chills, or PND  She sleeps well throughout the night without any issues  She feels like her breathing is stable and at baseline  Review of Systems  Complete-Female - Pulm:   Constitutional: No fever, no chills, feels well, no tiredness, no recent weight gain or weight loss  Eyes: no complaints of vision problems  ENT: no rhinitis, no PND, no epistaxis  Cardiovascular: no palpitations, no chest pain  Respiratory: as noted in HPI  Gastrointestinal: no complaints of esophageal reflux, no abdominal pain  Musculoskeletal: no arthralgias, no joint swelling, no myalgias  Neurological: no headache, no fainting, no weakness  Hematologic/Lymphatic:  no complaints of swollen glands  Past Medical History  1  History of Chronic Non-pressure Ulcer Of The Toes Of The Right Foot (707 15)   2  History of Contusion Of The Right Leg With Intact Skin Surface (924 10)   3  History of anxiety disorder (V11 8) (Z86 59)   4  Denied: History of head injury   5  History of Pain in joint of right shoulder region (719 41) (M29 410)   6  Denied: History of Seizures    Surgical History  1  History of Bladder Surgery   2  History of Cataract Surgery   3  History of Laminectomy Lumbar    Family History  Mother    1  No family history of mental disorder  Father    2  No family history of mental disorder    Social History   · Never A Smoker   · No alcohol use   · No drug use    Current Meds   1  Artificial Tears (Allscrips) SOLN; INSTILL 1-2 DROPS INTO AFFECTED EYE(S)  4 TIMES   DAILY AS DIRECTED; Therapy: (Recorded:17Qxu2106) to Recorded   2  BusPIRone HCl - 15 MG Oral Tablet; TAKE 1 TABLET 3 TIMES DAILY; Therapy: 67PCR7627 to (Evaluate:61Xkk9273)  Requested for: 75MQC4796; Last   Rx:08Jan2016 Ordered   3  Citalopram Hydrobromide 40 MG Oral Tablet; TAKE 1 TABLET DAILY; Therapy: 93VTK0453 to (Evaluate:94Cbg6918)  Requested for: 57TQF2722; Last   Rx:08Jan2016 Ordered   4  Furosemide 20 MG Oral Tablet; Takes one tablet daily; Therapy: 21Nxu4347 to Recorded   5  Ipratropium-Albuterol 0 5-2 5 (3) MG/3ML Inhalation Solution; INHALE 1 VIAL VIA   NEBULIZER THREE TIMES DAILY; Therapy: 06Irj9436 to Recorded   6  Levothyroxine Sodium 50 MCG Oral Tablet; Take 1 tablet by mouth once daily at 7am;   Therapy: 88SRW8833 to (Last Rx:29Oct2014)  Requested for: 29Oct2014 Ordered   7  Levoxyl 50 MCG Oral Tablet; TAKE 1 TABLET BY MOUTH ONCE DAILY AT 7AM   (HYPOTHYROIDISM); Therapy: 92IMF6633 to (Last Rx:28Oct2014)  Requested for: 28Oct2014 Ordered   8   Loperamide HCl - 2 MG Oral Capsule; TAKE 1 CAPSULE Every 6 hours PRN diarrhea; Therapy: 95FSE7401 to ([facility])  Requested for: 30WMX9240; Last   Rx:28Oct2014 Ordered   9  Loratadine 10 MG Oral Tablet; One tablet daily; Therapy: 90Ryk0363 to Recorded   10  Losartan Potassium-HCTZ 100-25 MG Oral Tablet; TAKE 1 TABLET ONCE DAILY; Therapy: 73SYC6456 to (Evaluate:75Xwi5619)  Requested for: 83ECJ9880; Last    Rx:25Mar2015 Ordered   11  Metoprolol Succinate ER 50 MG Oral Tablet Extended Release 24 Hour; TAKE 1 5    TABLET DAILY AS DIRECTED; Therapy: 44VNZ2954 to (Evaluate:38Ifn0227)  Requested for: 44PYI6324; Last    Rx:02Mar2015 Ordered   12  Omeprazole 20 MG Oral Capsule Delayed Release; TAKE 1 CAPSULE DAILY EVERY    MORNING BEFORE BREAKFAST; Therapy: 55EYE7117 to (Evaluate:76Laz4576)  Requested for: 84XLI7621; Last    Rx:25Mar2015 Ordered   13  QUEtiapine Fumarate 100 MG Oral Tablet; TAKE 1 TABLET AT BEDTIME; Therapy: 77CAE6212 to (Evaluate:43Mxn6354)  Requested for: 54AZM0340; Last    Rx:08Jan2016 Ordered   14  QUEtiapine Fumarate 25 MG Oral Tablet; Take 1 tablet daily; Therapy: 76HRV3595 to (Evaluate:35Wqu1173)  Requested for: 80RFF5759; Last    Rx:08Jan2016 Ordered   15  Sodium Chloride 0 9 % Inhalation Nebulization Solution; One vial via nebulizer three    times a day; Therapy: 74Sqt0404 to Recorded   16  Timolol Maleate 0 5 % Ophthalmic Gel Forming Solution; Therapy: 51SCQ4415 to (Last Rx:25Mar2011)  Requested for: 25Mar2011 Ordered   17  Torsemide 20 MG Oral Tablet; take 1 tablet every other day  Requested for: 23WRV9437; Last Rx:27Jan2015 Ordered    Allergies  1  Penicillins   2  Sulfa Drugs   3   Vicodin TABS    Vitals  Vital Signs    Recorded: 60UYV2930 09:20AM   Temperature 97 F   Heart Rate 62   Respiration 16   Systolic 222   Diastolic 60   Height 5 ft 4 in   Weight 241 lb    BMI Calculated 41 37   BSA Calculated 2 12   O2 Saturation 95, RA     Physical Exam    Constitutional   General appearance: No acute distress, well appearing and well nourished  Eyes   Examination of pupil and irises: Anicteric, pupils reactive  Ears, Nose, Mouth, and Throat   External inspection of ears and nose: Normal     Otoscopic examination: Tympanic membranes translucent with normal light reflex  Canals patent without erythema  Nasal mucosa, septum, and turbinates: Normal without edema or erythema  Lips, teeth, and gums: Normal, good dentition  Oropharynx: Normal with no erythema, edema, exudate or lesions  Neck   Neck: Supple, symmetric, trachea midline, no masses  Jugular veins: Normal     Pulmonary   Chest: Normal     Respiratory effort: No increased work of breathing or signs of respiratory distress  Auscultation of lungs: Clear to auscultation, no rales, no crackles, no wheezing  Cardiovascular   Auscultation of heart: Abnormal  -- Regular rate and rhythm, single S1, Single S2, + Murmurs, No rubs, or gallops  Examination of extremities for edema and/or varicosities: Normal     Abdomen   Abdomen: Soft, non-tender  -- obese  Lymphatic   Palpation of lymph nodes in neck: No lymphadenopathy  Musculoskeletal   Gait and station: Normal     Neurologic   Mental Status: Normal  Not confused, no evidence of dementia, good comprehension, good concentration  Skin   Skin and subcutaneous tissue: Limited exam shows no rash         Future Appointments    Date/Time Provider Specialty Site   11/06/2017 04:00 PM Liberty Goodpasture, DO Nephrology [address]     Signatures   Electronically signed by : Bambi Webster, [facility]; Oct  9 2017 10:53AM EST                       (Author)    Electronically signed by : Val Yadav DO; Oct 13 2017  1:04AM EST                       (Author)

## 2017-10-26 ENCOUNTER — HOSPITAL ENCOUNTER (OUTPATIENT)
Dept: ULTRASOUND IMAGING | Facility: HOSPITAL | Age: 82
Discharge: HOME | End: 2017-10-26
Payer: MEDICARE

## 2017-10-26 DIAGNOSIS — N18.4 CHRONIC KIDNEY DISEASE, STAGE IV (SEVERE) (HCC): ICD-10-CM

## 2017-10-26 PROCEDURE — 76770 US EXAM ABDO BACK WALL COMP: CPT

## 2017-11-06 ENCOUNTER — ALLSCRIPTS OFFICE VISIT (OUTPATIENT)
Dept: OTHER | Facility: OTHER | Age: 82
End: 2017-11-06

## 2017-11-06 DIAGNOSIS — D63.8 ANEMIA IN OTHER CHRONIC DISEASES CLASSIFIED ELSEWHERE: ICD-10-CM

## 2017-11-06 DIAGNOSIS — N18.4 CHRONIC KIDNEY DISEASE, STAGE IV (SEVERE) (HCC): ICD-10-CM

## 2017-11-07 NOTE — PROGRESS NOTES
Assessment  1  Anemia of chronic disease (285 29) (D63 8)   2  CKD (chronic kidney disease), stage IV (585 4) (N18 4)   3  Edema (782 3) (R60 9)   4  Hypertension (401 9) (I10)   5  Renal cyst (753 10) (N28 1)   6  Vitamin D deficiency (268 9) (E55 9)    Plan  Anemia of chronic disease    · (1) CBC/PLT/DIFF; Status:Active; Requested KED:11XCO2244;    Perform:[facility]; GWP:52ZRH1071; Ordered; For:Anemia of chronic disease; Ordered By:Elzbieta Moore;  CKD (chronic kidney disease), stage IV    · (1) BASIC METABOLIC PROFILE; Status:Active; Requested YDR:08SQO7398;    Perform:[facility]; RWJ:39RBK3703; Ordered; For:CKD (chronic kidney disease), stage IV; Ordered By:Elzbieta Moore;   · (1) URINALYSIS WITH MICROSCOPIC; Status:Active; Requested ANB:81CJQ7320;    Perform:[facility]; XYY:81DRO5144; Ordered; For:CKD (chronic kidney disease), stage IV; Ordered By:Elzbieta Moore;   · (1) URINE PROTEIN CREATININE RATIO; Status:Active; Requested DNH:98MND4708;    Perform:[facility]; XVW:51QST8917; Ordered; For:CKD (chronic kidney disease), stage IV; Ordered By:Elzbieta Moore;   · (1) VITAMIN D 25-HYDROXY; Status:Active; Requested MUH:95QDY6526;    Perform:[facility]; JNY:70CWZ4262; Ordered; For:CKD (chronic kidney disease), stage IV; Ordered By:Elzbieta Moore;  CKD (chronic kidney disease), stage IV, Renal cyst    · US KIDNEY AND BLADDER; Status:Hold For - Scheduling; Requested for:2018; Perform:[facility]; [id number];BVAPMUT; For:CKD (chronic kidney disease), stage IV, Renal cyst; Ordered By:Elzbieta Moore;    Discussion/Summary    1  CKD IV likely cardiorenal +/- from hypertensive nephrosclerosis +/- physiology of aging: baseline creatinine 1 6-1 9  sCr 1 7 (at baseline) today  She is on ARB and thiazide as well as torsemide listed below  negative for myelomanephrotoxins/NSAIDs(ibuprofen, aleve, motrin, advil)/fleet enemasrecommend avoidance of diclofenac but if this is the only medication that gives relief to hand pain, may continue sparingly  monitor proteinuria - repeat UpCr u/s shows normal echogenicity, 9 3-9 5cm kidneys with complex right renal cystcheck urinalysis with microscopy  Hypertension: BP well controlled on torsemide 20mg every day, losartan-HCTZ 100-25mg daily, metoprolol 75mg daily  [place] paperwork states patient is on torsemide 40mg daily x 4 days starting 11/2  Diastolic CHF and moderate AS: on torsemide as above, monitor daily weights  Please send record of daily weights with patient to next visit  Complex renal cyst: will follow up ultrasound 6 months(April 2018) to assess stablity of complex renal cyst  May need urology follow up if worse on repeat imaging  Anemia: No repeat CBC on file  Repeat now  [place] Bone Disease of CKD: Normal phos/PTH levels noted from November 3, 2017  Will check vitamin D levels with next bloodwork  in 3 months  Obtain bloodwork and urine testing prior to next visit  The patient was counseled regarding diagnostic results,-- instructions for management,-- prognosis  Reason For Visit  CKD, HTN, renal cyst      History of Present Illness  81 yo F with hx of HTN, anemia, CKD 4 presents in follow up of the same from [place] [address] home   had been admitted 8/9-8/18 after suffering a fall and L 4th phalanx fracture  She had JASON with [facility] sr 2 7 at that time  JASON was prerenal with improvement upon holding diuretics and ARB  Also given IVF  record review, patient's sCr 1 76 as of bloodwork performed today  Normal sNa, K 3 9, BUN 39  Is on torsemide at [facility]  sCr 1 63 as of Sept 2017  she feels well  recent hospitalizations/illnesses  had decreased appetite  Has lost 40 lbs since hospitalization  234 lbs this morning  She was at 280 lbs in August 2017  Review of Systems    Constitutional: no fever-- and-- no chills  Integumentary: no rashes     Gastrointestinal: no abdominal pain,-- no constipation,-- no nausea,-- no diarrhea-- and-- no vomiting  Respiratory: no shortness of breath-- and-- no cough  Cardiovascular: lower extremity edema, but-- no chest pain  Musculoskeletal: joint pain-- and-- Has hand pain from arthritis  , but-- no joint swelling  Neurological: no headache,-- no lightheadedness-- and-- no dizziness  Genitourinary: no dysuria-- and-- no hematuria  Eyes: no eyesight problems  ENT: no hearing loss  Psychiatric: no anxiety-- and-- no depression  ROS reviewed  Past Medical History    The active problems and past medical history were reviewed and updated today  Surgical History    The surgical history was reviewed and updated today  Family History    The family history was reviewed and updated today  Social History  The social history was reviewed and updated today  The social history was reviewed and is unchanged  Current Meds   1  Alphagan P 0 1 % Ophthalmic Solution; INSTILL 1 DROP IN BOTH EYES EVERY 12   HOURS DAILY; Therapy: (Recorded:06Nov2017) to Recorded   2  BusPIRone HCl - 15 MG Oral Tablet; TAKE 1 TABLET 3 TIMES DAILY; Therapy: 24AHQ7540 to (Evaluate:59Wtj5218)  Requested for: 47KAP4950; Last   Rx:08Jan2016 Ordered   3  Citalopram Hydrobromide 40 MG Oral Tablet; TAKE 1 TABLET DAILY; Therapy: 63INA1653 to (Evaluate:10Lnn7098)  Requested for: 08WFT2597; Last   Rx:08Jan2016 Ordered   4  Diclofenac Sodium 1 % Gel; APPLY SPARINGLY TO AFFECTED AREA(S) ONCE DAILY; Therapy: (653 3426) to Recorded   5  Levothyroxine Sodium 50 MCG Oral Tablet; Take 1 tablet by mouth once daily at 7am;   Therapy: 90MPL1017 to (Last Rx:29Oct2014)  Requested for: 29Oct2014 Ordered   6  Lidoderm 5 % External Patch; APPLY 1 PATCH TO THE AFFECTED AREA AND LEAVE IN   PLACE FOR 12 HOURS, THEN REMOVE AND LEAVE OFF FOR 12 HOURS; Therapy: (514 6288) to Recorded   7  Loratadine 10 MG Oral Tablet; One tablet daily; Therapy: 11Apr2017 to Recorded   8  Losartan Potassium 25 MG Oral Tablet; TAKE 1 TABLET DAILY; Therapy: (0472 51 11 42) to Recorded   9  Omeprazole 20 MG Oral Capsule Delayed Release; TAKE 1 CAPSULE DAILY EVERY   MORNING BEFORE BREAKFAST; Therapy: 76ACS8499 to (Evaluate:96Azq2107)  Requested for: 18JXI9053; Last   Rx:25Mar2015 Ordered   10  Potassium Chloride 10 MEQ TBCR; TAKE 1 TABLET DAILY; Therapy: (0472 51 11 42) to Recorded   11  QUEtiapine Fumarate 25 MG Oral Tablet; TAKE 1 TABLET 3 TIMES DAILY; Therapy: (0472 51 11 42) to Recorded   12  QUEtiapine Fumarate 25 MG Oral Tablet; TAKE 1/2 TABLET AT BEDTIME; Therapy: (0472 51 11 42) to Recorded   13  Senna 8 6 MG Oral Tablet; take 2 tabs at bedtime; Therapy: (0472 51 11 42) to Recorded   14  Toprol XL 50 MG Oral Tablet Extended Release 24 Hour; TAKE 1 AND 1/2 TABLETS    DAILY; Therapy: (0472 51 11 42) to Recorded   15  Torsemide 20 MG Oral Tablet; take 1 tablet every other day  Requested for: 09LKV0951; Last Rx:27Jan2015 Ordered   16  Torsemide 20 MG Oral Tablet; TAKE 2 TABS DAILY; Therapy: (0472 51 11 42) to Recorded   17  Tylenol Extra Strength 500 MG Oral Tablet; Take 2 tablets daily; Therapy: (0472 51 11 42) to Recorded   18  Vitamin D3 2000 UNIT Oral Tablet; Take 1 tablet daily; Therapy: (0472 51 11 42) to Recorded   19  Zioptan 0 0015 % Ophthalmic Solution; 1 drop in both eyes daily; Therapy: (0472 51 11 42) to Recorded    The medication list was reviewed and updated today  Allergies  1  Penicillins   2  Sulfa Drugs   3  Vicodin TABS    Vitals  Vital Signs    Recorded: 51VPP5711 58:19PU   Systolic 956   Diastolic 70   Height 5 ft 4 in   Weight 234 lb    BMI Calculated 40 17   BSA Calculated 2 1     Physical Exam    Constitutional: General appearance: No acute distress, well appearing and well nourished  ENT: External ears and nose appear normal      Eyes: Anicteric sclerae       Pulmonary: Respiratory effort: No increased work of breathing or signs of respiratory distress  -- Auscultation of lungs: Clear to auscultation  Cardiovascular: Auscultation of heart: Normal rate and rhythm, normal S1 and S2, without murmurs  Abdomen: Non-tender, no masses  Extremities: No cyanosis, clubbing or edema  Rash: No rash present  Neurologic: Non Focal      Psychiatric: Orientation to person, place, and time: Normal  -- and-- Mood and affect: Normal        Results/Data  [address] [place] [id number] 12:11PM Amari Dao Order Number: [id number]   Performing Comments: Reevaluate complex renal cyst for stability   - Patient Instructions: To schedule this appointment, please contact Central Scheduling at [phone]  Test Name Result Flag Reference   US KIDNEY AND BLADDER (Report)     This is a summary report  The complete report is available in the patient's medical record  If you cannot access the medical record, please contact the sending organization for a detailed fax or copy  RENAL ULTRASOUND     INDICATION: Chronic kidney disease, stage IV  Complex right renal cyst      COMPARISON: Renal ultrasound 6/30/2017, CT abdomen and pelvis 5/26/2017     TECHNIQUE:  Ultrasound of the retroperitoneum was performed with a curvilinear transducer utilizing volumetric sweeps and still imaging techniques  FINDINGS:     KIDNEYS:   Symmetric and normal size  Right kidney: 9 3 x 4 6 cm  Normal echogenicity and contour  No suspicious masses detected  1 9 x 1 5 x 2 0 cm mid pole cyst with some peripheral echoes, possibly debris  The internal echoes appear slightly decreased from the prior study  No internal vascularity detected  Previously this measured 1 8 x 1 2 x    1 7 cm  No hydronephrosis  No shadowing calculi  No perinephric fluid collections  Left kidney: 9 5 x 4 6 cm  Evaluation somewhat limited by adjacent bowel gas/rib shadows  Normal echogenicity and contour      No suspicious masses detected within limitations  Left lower pole detail may be obscured  No hydronephrosis  No shadowing calculi  No perinephric fluid collections  URETERS:   Nonvisualized  BLADDER:    Poorly distended, evaluation limited  No focal pathology seen within limitations  IMPRESSION:     Slight interval enlargement in size of complex right renal cyst with internal echoes, possibly representing debris  Internal complexity has slightly decreased from the prior study  No internal vascularity detected  Continued ultrasound surveillance in   6 months recommended  Limited evaluation of the left kidney and urinary bladder       ##fuslh6##fuslh6       Workstation performed: [id number]     Signed by:   Mckenzie Metzger DO   10/30/17       Signatures   Electronically signed by : Yuliana Kapadia DO; Nov 6 2017  4:48PM EST                       (Author)

## 2018-01-10 NOTE — PSYCH
Message  Message Free Text Note Form: She had written a note to this writer (her therapist) informing her that due her" poor mobility and breathing problems   plus the dealings with the [facility]" that she is no longer able to participate in individual counseling at this practice with this writer (her therapist)  She will be discharged from her ind  counseling at this practice  Active Problems    1  Anemia of chronic disease (285 29) (D63 8)   2  Aortic stenosis (747 22) (Q25 3)   3  Atopic dermatitis (691 8) (L20 9)   4  Bilateral impacted cerumen (380 4) (H61 23)   5  Chronic kidney disease (585 9) (N18 9)   6  Cough (786 2) (R05)   7  Dry eye (375 15) (H04 129)   8  Dysthymic disorder (300 4) (F34 1)   9  Esophageal reflux (530 81) (K21 9)   10  EROS (generalized anxiety disorder) (300 02) (F41 1)   11  Generalized osteoarthritis (715 00) (M15 9)   12  Generalized pain (780 96) (R52)   13  Glaucoma (365 9) (H40 9)   14  Hypertension (401 9) (I10)   15  Hypothyroidism (244 9) (E03 9)   16  Lumbar canal stenosis (724 02) (M48 06)   17  Major depressive disorder, recurrent, in full remission (296 36) (F33 42)   18  Venous insufficiency (chronic) (peripheral) (459 81) (I87 2)   19  Vitamin D deficiency (268 9) (E55 9)    Current Meds   1  Artificial Tears (Allscrips) SOLN Recorded   2  BusPIRone HCl - 15 MG Oral Tablet; TAKE 1 TABLET 3 TIMES DAILY; Therapy: 71GUK7170 to (Evaluate:80Gui8063)  Requested for: 00UIY3704; Last   Rx:08Jan2016 Ordered   3  Citalopram Hydrobromide 40 MG Oral Tablet; TAKE 1 TABLET DAILY; Therapy: 58DJM4863 to (Evaluate:92Hdx6677)  Requested for: 87BVE2828; Last   Rx:08Jan2016 Ordered   4  Levothyroxine Sodium 50 MCG Oral Tablet; Take 1 tablet by mouth once daily at 7am;   Therapy: 24GCY7559 to (Last Rx:29Oct2014)  Requested for: 29Oct2014 Ordered   5  Levoxyl 50 MCG Oral Tablet; TAKE 1 TABLET BY MOUTH ONCE DAILY AT 7AM   (HYPOTHYROIDISM);    Therapy: 02Dec2011 to (Last Rx: 27NZY4227)  Requested for: 92RIM6227 Ordered   6  Loperamide HCl - 2 MG Oral Capsule; TAKE 1 CAPSULE Every 6 hours PRN diarrhea; Therapy: 57VXI6843 to (Pat Granado)  Requested for: 43ZIP9341; Last   Rx:28Oct2014 Ordered   7  LORazepam 0 5 MG Oral Tablet (Ativan); TAKE 1 TABLET 3 TIMES DAILY; Therapy: 65TTU3777 to (Evaluate:14Lhx7306)  Requested for: 45AES5992; Last   Rx:08Jan2016 Ordered   8  Losartan Potassium-HCTZ 100-25 MG Oral Tablet; TAKE 1 TABLET ONCE DAILY; Therapy: 70XVA6241 to (Evaluate:07Axl3435)  Requested for: 78YRU5037; Last   Rx:25Mar2015 Ordered   9  Lumigan 0 01 % Ophthalmic Solution; Therapy: 89ESQ8466 to (Last Rx:25Mar2011)  Requested for: 25Mar2011 Ordered   10  Metoprolol Succinate ER 50 MG Oral Tablet Extended Release 24 Hour; TAKE 1 5    TABLET DAILY AS DIRECTED; Therapy: 98RYD9070 to (Evaluate:57Wiu1608)  Requested for: 77WLE3070; Last    Rx:02Mar2015 Ordered   11  Mucinex DM  MG Oral Tablet Extended Release 12 Hour; TAKE 1 TO 2 TABLETS    EVERY 12 HOURS AS NEEDED; Therapy: 88VUH7160 to (Evaluate:02Apr2013)  Requested for: 28Mar2013; Last    Rx:28Mar2013 Ordered   12  Nystatin 756588 UNIT/GM External Powder; APPLY 2-3 TIMES DAILY TO AFFECTED    AREA(S); Therapy: 81WOZ5986 to (Last Rx:01Jun2015)  Requested for: 01Jun2015 Ordered   13  Omeprazole 20 MG Oral Capsule Delayed Release; TAKE 1 CAPSULE DAILY EVERY    MORNING BEFORE BREAKFAST; Therapy: 44OQD2880 to (Evaluate:98Rsj0594)  Requested for: 96EAW9098; Last    Rx:25Mar2015 Ordered   14  QUEtiapine Fumarate 100 MG Oral Tablet (SEROquel); TAKE 1 TABLET AT BEDTIME; Therapy: 40YPD4015 to (Evaluate:28Ehd6316)  Requested for: 53QBG4628; Last    Rx:08Jan2016 Ordered   15  QUEtiapine Fumarate 25 MG Oral Tablet; Take 1 tablet daily; Therapy: 41YUY4856 to (Evaluate:24Rhm5417)  Requested for: 71JHO0121; Last    Rx:08Jan2016 Ordered   16   QUEtiapine Fumarate 50 MG Oral Tablet; TAKE 1 TABLET AT BEDTIME; Therapy: 55XGZ7558 to (Evaluate:51Bqc3889)  Requested for: 23LNR2516; Last    Rx:08Jan2016 Ordered   17  SEROquel 100 MG Oral Tablet (QUEtiapine Fumarate); Therapy: ([phone]) to Recorded   18  Timolol Maleate 0 5 % Ophthalmic Gel Forming Solution; Therapy: 55VMS5011 to (Last Rx:25Mar2011)  Requested for: 25Mar2011 Ordered   19  Torsemide 20 MG Oral Tablet (Demadex); take 1 tablet every other day  Requested for:    18LOY0095; Last Rx:09Jan2015 Ordered   20  Torsemide 20 MG Oral Tablet; take 1 tablet every other day  Requested for: 50ROV4027; Last Rx:27Jan2015 Ordered   21  TraMADol HCl - 50 MG Oral Tablet; TAKE 1 TABLET 3 TIMES DAILY; Therapy: 50Qpe6478 to (Evaluate:82Vfx4054)  Requested for: 61Kby9369; Last    Rx:27Eqb7453 Ordered   22  Tylenol Arthritis Ext Relief 650 MG TBCR; 1 tab 3x daily Recorded   23  Vitamin D 2000 UNIT Oral Tablet; One tablet daily; Therapy: 44QGC5679 to (Last Rx:25Mar2015)  Requested for: 25Mar2015 Ordered   24  Zioptan 0 0015 % Ophthalmic Solution; Therapy: 12GHV7357 to (Evaluate:05Hsd6197) Recorded    Allergies    1  Penicillins   2  Sulfa Drugs   3   Vicodin TABS    Signatures   Electronically signed by : Gena Jackson, TONIALCSWTONIA,TERRA; Apr 13 2016 11:46AM EST                       (Author)

## 2018-01-13 VITALS
TEMPERATURE: 97 F | WEIGHT: 241 LBS | RESPIRATION RATE: 16 BRPM | HEART RATE: 62 BPM | DIASTOLIC BLOOD PRESSURE: 60 MMHG | HEIGHT: 64 IN | BODY MASS INDEX: 41.15 KG/M2 | OXYGEN SATURATION: 95 % | SYSTOLIC BLOOD PRESSURE: 120 MMHG

## 2018-01-13 VITALS
WEIGHT: 234 LBS | BODY MASS INDEX: 39.95 KG/M2 | HEIGHT: 64 IN | DIASTOLIC BLOOD PRESSURE: 70 MMHG | SYSTOLIC BLOOD PRESSURE: 132 MMHG

## 2018-01-14 VITALS
SYSTOLIC BLOOD PRESSURE: 132 MMHG | HEIGHT: 64 IN | WEIGHT: 241.38 LBS | HEART RATE: 72 BPM | BODY MASS INDEX: 41.21 KG/M2 | DIASTOLIC BLOOD PRESSURE: 68 MMHG

## 2018-01-14 VITALS
RESPIRATION RATE: 18 BRPM | TEMPERATURE: 99.2 F | HEART RATE: 78 BPM | OXYGEN SATURATION: 94 % | SYSTOLIC BLOOD PRESSURE: 100 MMHG | DIASTOLIC BLOOD PRESSURE: 60 MMHG | HEIGHT: 64 IN

## 2018-01-22 VITALS
WEIGHT: 241 LBS | HEART RATE: 65 BPM | HEIGHT: 64 IN | TEMPERATURE: 98.7 F | RESPIRATION RATE: 16 BRPM | DIASTOLIC BLOOD PRESSURE: 44 MMHG | SYSTOLIC BLOOD PRESSURE: 113 MMHG | BODY MASS INDEX: 41.15 KG/M2

## 2018-02-09 ENCOUNTER — HOSPITAL ENCOUNTER (EMERGENCY)
Facility: HOSPITAL | Age: 83
Discharge: HOME | End: 2018-02-10
Attending: EMERGENCY MEDICINE | Admitting: EMERGENCY MEDICINE
Payer: MEDICARE

## 2018-02-09 VITALS
OXYGEN SATURATION: 96 % | DIASTOLIC BLOOD PRESSURE: 66 MMHG | HEART RATE: 88 BPM | BODY MASS INDEX: 40.38 KG/M2 | TEMPERATURE: 98.4 F | SYSTOLIC BLOOD PRESSURE: 155 MMHG | WEIGHT: 235.23 LBS | RESPIRATION RATE: 18 BRPM

## 2018-02-09 DIAGNOSIS — R11.10 VOMITING ALONE: Primary | ICD-10-CM

## 2018-02-09 LAB
ALBUMIN SERPL BCP-MCNC: 2.7 G/DL (ref 3.5–5)
ALP SERPL-CCNC: 54 U/L (ref 46–116)
ALT SERPL W P-5'-P-CCNC: 13 U/L (ref 12–78)
ANION GAP SERPL CALCULATED.3IONS-SCNC: 11 MMOL/L (ref 4–13)
AST SERPL W P-5'-P-CCNC: 14 U/L (ref 5–45)
BASOPHILS # BLD AUTO: 0.01 THOUSANDS/ΜL (ref 0–0.1)
BASOPHILS NFR BLD AUTO: 0 % (ref 0–1)
BILIRUB SERPL-MCNC: 0.3 MG/DL (ref 0.2–1)
BUN SERPL-MCNC: 37 MG/DL (ref 5–25)
CALCIUM SERPL-MCNC: 8.9 MG/DL (ref 8.3–10.1)
CHLORIDE SERPL-SCNC: 104 MMOL/L (ref 100–108)
CO2 SERPL-SCNC: 26 MMOL/L (ref 21–32)
CREAT SERPL-MCNC: 1.95 MG/DL (ref 0.6–1.3)
EOSINOPHIL # BLD AUTO: 0 THOUSAND/ΜL (ref 0–0.61)
EOSINOPHIL NFR BLD AUTO: 0 % (ref 0–6)
ERYTHROCYTE [DISTWIDTH] IN BLOOD BY AUTOMATED COUNT: 14.3 % (ref 11.6–15.1)
GFR SERPL CREATININE-BSD FRML MDRD: 23 ML/MIN/1.73SQ M
GLUCOSE SERPL-MCNC: 174 MG/DL (ref 65–140)
HCT VFR BLD AUTO: 29.2 % (ref 34.8–46.1)
HGB BLD-MCNC: 8.9 G/DL (ref 11.5–15.4)
LIPASE SERPL-CCNC: 174 U/L (ref 73–393)
LYMPHOCYTES # BLD AUTO: 0.85 THOUSANDS/ΜL (ref 0.6–4.47)
LYMPHOCYTES NFR BLD AUTO: 12 % (ref 14–44)
MCH RBC QN AUTO: 27.1 PG (ref 26.8–34.3)
MCHC RBC AUTO-ENTMCNC: 30.5 G/DL (ref 31.4–37.4)
MCV RBC AUTO: 89 FL (ref 82–98)
MONOCYTES # BLD AUTO: 0.53 THOUSAND/ΜL (ref 0.17–1.22)
MONOCYTES NFR BLD AUTO: 7 % (ref 4–12)
NEUTROPHILS # BLD AUTO: 5.87 THOUSANDS/ΜL (ref 1.85–7.62)
NEUTS SEG NFR BLD AUTO: 81 % (ref 43–75)
PLATELET # BLD AUTO: 244 THOUSANDS/UL (ref 149–390)
PMV BLD AUTO: 9 FL (ref 8.9–12.7)
POTASSIUM SERPL-SCNC: 3.7 MMOL/L (ref 3.5–5.3)
PROT SERPL-MCNC: 6.7 G/DL (ref 6.4–8.2)
RBC # BLD AUTO: 3.29 MILLION/UL (ref 3.81–5.12)
SODIUM SERPL-SCNC: 141 MMOL/L (ref 136–145)
WBC # BLD AUTO: 7.26 THOUSAND/UL (ref 4.31–10.16)

## 2018-02-09 PROCEDURE — 36415 COLL VENOUS BLD VENIPUNCTURE: CPT | Performed by: EMERGENCY MEDICINE

## 2018-02-09 PROCEDURE — 83690 ASSAY OF LIPASE: CPT | Performed by: EMERGENCY MEDICINE

## 2018-02-09 PROCEDURE — 85025 COMPLETE CBC W/AUTO DIFF WBC: CPT | Performed by: EMERGENCY MEDICINE

## 2018-02-09 PROCEDURE — 80053 COMPREHEN METABOLIC PANEL: CPT | Performed by: EMERGENCY MEDICINE

## 2018-02-09 PROCEDURE — 93005 ELECTROCARDIOGRAM TRACING: CPT

## 2018-02-10 PROCEDURE — 99284 EMERGENCY DEPT VISIT MOD MDM: CPT

## 2018-02-10 NOTE — ED NOTES
Call from [facility]; will be over in 20 minutes to  patient to go back to SNF       Lesvia Chopra RN  02/09/18 5498

## 2018-02-10 NOTE — ED PROVIDER NOTES
History  Chief Complaint   Patient presents with    Vomiting     Pt arrived via EMS after c/o 3 episodes of vomiting at dinner  Nazario any complaints at this time  Gracedal provided 1 ntro and 324 ASA  59-year-old female presents to the emergency department from her nursing residence  She explains that she had consumed her dinner in bed and felt well while doing so  She had half a grilled cheese sandwich, chicken and rice soup in the next thing she knew she relates that she "started vomiting all liquid  " she describes this as "mucus  "  She denies having brought up any of the food that she had just consumed  She notes that she did have some coughing at that time as well  She now feels quite well  She denies having had any chest discomfort or shortness of breath  Denies any recent illness or injury  No history biliary colic  Medical history significant for hypertension, CHF, COPD, aortic stenosis, GERD and CKD  Abdominal surgical history significant for remote hysterectomy  Prior to Admission Medications   Prescriptions Last Dose Informant Patient Reported? Taking? Cholecalciferol (VITAMIN D3) 2000 UNITS TABS   Yes No   Sig: Take 1 tablet by mouth daily  QUEtiapine (SEROquel) 25 mg tablet   No No   Sig: Take 3 tablets by mouth daily at bedtime   QUEtiapine (SEROquel) 25 mg tablet   No No   Sig: Take 0 5 tablets by mouth daily   acetaminophen (TYLENOL) 325 mg tablet   No No   Sig: Take 2 tablets by mouth every 6 (six) hours as needed for fever   carbamide peroxide (DEBROX) 6 5 % otic solution   Yes No   Sig: Administer 5 drops into both ears 3 (three) times a week     citalopram (CeleXA) 40 mg tablet   Yes No   Sig: Take 40 mg by mouth daily  docusate sodium (COLACE) 100 mg capsule   No No   Sig: Take 1 capsule by mouth 2 (two) times a day   levothyroxine 50 mcg tablet   Yes No   Sig: Take 50 mcg by mouth daily     loratadine (CLARITIN) 10 mg tablet   Yes No   Sig: Take 10 mg by mouth daily   losartan (COZAAR) 25 mg tablet   No No   Sig: Take 1 tablet by mouth daily   metoprolol succinate (TOPROL-XL) 50 mg 24 hr tablet   Yes No   Sig: Take 75 mg by mouth daily  Hold for sbp< 100 or hr <55   omeprazole (PriLOSEC) 20 mg delayed release capsule   Yes No   Sig: Take 20 mg by mouth daily  polyethylene glycol (MIRALAX) 17 g packet   No No   Sig: Take 17 g by mouth daily   senna (SENOKOT) 8 6 mg   No No   Sig: Take 2 tablets by mouth daily at bedtime   timolol (TIMOPTIC) 0 5 % ophthalmic solution   No No   Sig: Administer 1 drop to both eyes daily   torsemide (DEMADEX) 20 mg tablet   No No   Sig: Take 1 tablet by mouth daily      Facility-Administered Medications: None       Past Medical History:   Diagnosis Date    Anxiety     Aortic stenosis     Aortic stenosis     Arthritis     Chronic anemia     Chronic venous insufficiency     CKD (chronic kidney disease)     Disease of thyroid gland     Dry eye     GERD (gastroesophageal reflux disease)     Glaucoma     Hypertension     Hypothyroidism     Lumbar spinal stenosis     Obesity     Osteoarthritis        Past Surgical History:   Procedure Laterality Date    BACK SURGERY      HYSTERECTOMY      TONSILLECTOMY         Family History   Problem Relation Age of Onset    Heart attack Maternal Grandmother      I have reviewed and agree with the history as documented  Social History   Substance Use Topics    Smoking status: Never Smoker    Smokeless tobacco: Not on file    Alcohol use No        Review of Systems   Constitutional: Negative for fever  Cardiovascular: Negative for chest pain, palpitations and leg swelling  Gastrointestinal: Negative for constipation and diarrhea  Skin: Negative for rash  All other systems reviewed and are negative        Physical Exam  ED Triage Vitals [02/09/18 2123]   Temperature Pulse Respirations Blood Pressure SpO2   98 4 °F (36 9 °C) 88 18 155/66 96 %      Temp src Heart Rate Source Patient Position - Orthostatic VS BP Location FiO2 (%)   -- Monitor Lying Right arm --      Pain Score       No Pain           Orthostatic Vital Signs  Vitals:    02/09/18 2123   BP: 155/66   Pulse: 88   Patient Position - Orthostatic VS: Lying       Physical Exam   Constitutional: She is oriented to person, place, and time  She appears well-developed and well-nourished  HENT:   Head: Normocephalic  Eyes: Conjunctivae and EOM are normal    Cardiovascular: Normal rate and regular rhythm  Murmur heard  Pulmonary/Chest: Effort normal and breath sounds normal    Abdominal: Soft  Bowel sounds are normal  Distention:   Mild epigastrium  There is tenderness  Musculoskeletal: Normal range of motion  She exhibits no edema or tenderness  Neurological: She is alert and oriented to person, place, and time  Skin: Skin is warm and dry  Psychiatric: She has a normal mood and affect  Her behavior is normal    Nursing note and vitals reviewed  ED Medications  Medications - No data to display    Diagnostic Studies  Results Reviewed     Procedure Component Value Units Date/Time    Comprehensive metabolic panel [[id number]]  (Abnormal) Collected:  02/09/18 2227    Lab Status:  Final result Specimen:  Blood from Arm, Right Updated:  02/09/18 2259     Sodium 141 mmol/L      Potassium 3 7 mmol/L      Chloride 104 mmol/L      CO2 26 mmol/L      Anion Gap 11 mmol/L      BUN 37 (H) mg/dL      Creatinine 1 95 (H) mg/dL      Glucose 174 (H) mg/dL      Calcium 8 9 mg/dL      AST 14 U/L      ALT 13 U/L      Alkaline Phosphatase 54 U/L      Total Protein 6 7 g/dL      Albumin 2 7 (L) g/dL      Total Bilirubin 0 30 mg/dL      eGFR 23 ml/min/1 73sq m     Narrative:         National Kidney Disease Education Program recommendations are as follows:  GFR calculation is accurate only with a steady state creatinine  Chronic Kidney disease less than 60 ml/min/1 73 sq  meters  Kidney failure less than 15 ml/min/1 73 sq  meters      Lipase [[id number]]  (Normal) Collected:  02/09/18 2227    Lab Status:  Final result Specimen:  Blood from Arm, Right Updated:  02/09/18 2259     Lipase 174 u/L     CBC and differential [[id number]]  (Abnormal) Collected:  02/09/18 2227    Lab Status:  Final result Specimen:  Blood from Arm, Right Updated:  02/09/18 2233     WBC 7 26 Thousand/uL      RBC 3 29 (L) Million/uL      Hemoglobin 8 9 (L) g/dL      Hematocrit 29 2 (L) %      MCV 89 fL      MCH 27 1 pg      MCHC 30 5 (L) g/dL      RDW 14 3 %      MPV 9 0 fL      Platelets 249 Thousands/uL      Neutrophils Relative 81 (H) %      Lymphocytes Relative 12 (L) %      Monocytes Relative 7 %      Eosinophils Relative 0 %      Basophils Relative 0 %      Neutrophils Absolute 5 87 Thousands/µL      Lymphocytes Absolute 0 85 Thousands/µL      Monocytes Absolute 0 53 Thousand/µL      Eosinophils Absolute 0 00 Thousand/µL      Basophils Absolute 0 01 Thousands/µL                  No orders to display              Procedures  ECG 12 Lead Documentation  Date/Time: 2/9/2018 10:34 PM  Performed by: Raheem Elizabeth  Authorized by: Raheem Elizabeth     ECG reviewed by me, the ED Provider: yes    Patient location:  ED and bedside  Previous ECG:     Previous ECG:  Compared to current    Comparison ECG info:   June 27, 2017  Rate:     ECG rate:  84    ECG rate assessment: normal    Rhythm:     Rhythm: sinus rhythm and A-V block      Rhythm comment:  1st degree  Ectopy:     Ectopy: none    QRS:     QRS axis:  Left  Conduction:     Conduction: normal    ST segments:     ST segments:  Normal  T waves:     T waves: normal             Phone Contacts  ED Phone Contact    ED Course  ED Course as of Feb 10 1312   Fri Feb 09, 2018   2306  Labs have returned  Hemoglobin is low at 8 9 though this seems to be higher than patient's baseline  Creatinine is elevated at patient's baseline  2312   Patient feeling well at this time    She has consumed water without any return of nausea  She denies having any discomfort and feels well for discharge  Labs are at baseline/ unremarkable  MDM  CritCare Time    Disposition  Final diagnoses:   Vomiting alone     Time reflects when diagnosis was documented in both MDM as applicable and the Disposition within this note     Time User Action Codes Description Comment    2/9/2018 11:11 PM Judy HOOPER Add [R11 11] Vomiting alone       ED Disposition     ED Disposition Condition Comment    Discharge  Liat Norris discharge to home/self care  Condition at discharge: Good        Follow-up Information    None       Discharge Medication List as of 2/9/2018 11:11 PM      CONTINUE these medications which have NOT CHANGED    Details   acetaminophen (TYLENOL) 325 mg tablet Take 2 tablets by mouth every 6 (six) hours as needed for fever, Starting Fri 8/18/2017, No Print      carbamide peroxide (DEBROX) 6 5 % otic solution Administer 5 drops into both ears 3 (three) times a week  , Historical Med      Cholecalciferol (VITAMIN D3) 2000 UNITS TABS Take 1 tablet by mouth daily  , Until Discontinued, Historical Med      citalopram (CeleXA) 40 mg tablet Take 40 mg by mouth daily  , Until Discontinued, Historical Med      docusate sodium (COLACE) 100 mg capsule Take 1 capsule by mouth 2 (two) times a day, Starting Fri 8/18/2017, No Print      levothyroxine 50 mcg tablet Take 50 mcg by mouth daily  , Until Discontinued, Historical Med      loratadine (CLARITIN) 10 mg tablet Take 10 mg by mouth daily, Until Discontinued, Historical Med      losartan (COZAAR) 25 mg tablet Take 1 tablet by mouth daily, Starting Fri 8/18/2017, No Print      metoprolol succinate (TOPROL-XL) 50 mg 24 hr tablet Take 75 mg by mouth daily  Hold for sbp< 100 or hr <55, Until Discontinued, Historical Med      omeprazole (PriLOSEC) 20 mg delayed release capsule Take 20 mg by mouth daily  , Until Discontinued, Historical Med      polyethylene glycol (MIRALAX) 17 g packet Take 17 g by mouth daily, Starting Fri 8/18/2017, No Print      !! QUEtiapine (SEROquel) 25 mg tablet Take 3 tablets by mouth daily at bedtime, Starting Fri 6/30/2017, No Print      !! QUEtiapine (SEROquel) 25 mg tablet Take 0 5 tablets by mouth daily, Starting Fri 6/30/2017, No Print      senna (SENOKOT) 8 6 mg Take 2 tablets by mouth daily at bedtime, Starting Fri 8/18/2017, No Print      timolol (TIMOPTIC) 0 5 % ophthalmic solution Administer 1 drop to both eyes daily, Starting Fri 8/18/2017, No Print      torsemide (DEMADEX) 20 mg tablet Take 1 tablet by mouth daily, Starting Fri 8/18/2017, No Print       !! - Potential duplicate medications found  Please discuss with provider  No discharge procedures on file      ED Provider  Electronically Signed by           Oumar Alves MD  02/10/18 1938

## 2018-02-12 LAB
ATRIAL RATE: 88 BPM
QRS AXIS: -22 DEGREES
QRSD INTERVAL: 82 MS
QT INTERVAL: 350 MS
QTC INTERVAL: 414 MS
T WAVE AXIS: 84 DEGREES
VENTRICULAR RATE: 84 BPM

## 2018-02-12 PROCEDURE — 93010 ELECTROCARDIOGRAM REPORT: CPT | Performed by: INTERNAL MEDICINE

## 2018-03-07 NOTE — PSYCH
Discharge Summary  Psychiatric Therapist Discharge Summary [facility]:   Discharge Summary: Admission Date: 98 Discharge Date: 16  medical and transportaiton issues  Discharge Diagnosis:    Axis I: F34 1, F41 9   Axis II: deferred   Axis III: multiple including degenerative arthritis   Axis IV: her living arrangements (assisted living); family issues;  Treating Physician: Dr Lindsay Thomas   Treatment Complications: decided to terminate individual counseling due to her report of transporation issues, "poor mobility and breathing problems;" Admit: 62  Discharge: 69    Prognosis at time of discharge is good  Presenting Problem/Pertinent findings:  She had presented with depression, poor self image, not feeling accepted by others: "What is normal?," lonelieness ( had  12 years prior to her initial counseling session on 98), her dog had  within the past several years;  Course of treatment includes  individual counseling  Treatment Progress: Ciarra Orona consistently participated in her counseling sessions for many years initially driving herself followed by her sister driving her for approximately one year followed by Elisabeth's report of her sister no longer driving her  She noted her relying on the [employer] services have proven to be very stressful for her due to her increased mobility issues (during her most recent sessions had been using a walker)  While she had expressed ongoing concern about her bro-in-law's alleged disrespectful behavior directed to her (and her report of her sister doing nothing to address this matter), she, also, expressed much satisfaction with her relationships with other family members including a niece with whom she had reported a very good relationship  She always looked forward to family gatherings although they had become increasingly difficult for her to be a part of due to transportation and mobility issues   She had presented during the past year as being, at least, in partial remission with the depression, and she had also made valid efforts to adapt/adjust to her relocation from her home into an assisted living facility  She tried to remain positive and sought out those residents who were able to socialize with her  The consumer achieved all of their goals  Criteria for Discharge: The patient has   chose to discontinue counseling due to her report of transtoration issues as well as poor mobility and breathing problems  Aftercare recommendations include:  continue with medication management services; Discharge Medications include: via Dr Chhaya Quintero: Seroquel, Buspirone HCL, ,Citalopram and Lorazapam   Prognosis [facility]:   Prognosis: had been very committed to her counseling particularly in effort to redirect the neg  (depression) thinking to pos /realistic (depression management) thinking;  Active Problems    1  Anemia of chronic disease (285 29) (D63 8)   2  Aortic stenosis (747 22) (Q25 3)   3  Atopic dermatitis (691 8) (L20 9)   4  Bilateral impacted cerumen (380 4) (H61 23)   5  Chronic kidney disease (585 9) (N18 9)   6  Cough (786 2) (R05)   7  Dry eye (375 15) (H04 129)   8  Dysthymic disorder (300 4) (F34 1)   9  Esophageal reflux (530 81) (K21 9)   10  EROS (generalized anxiety disorder) (300 02) (F41 1)   11  Generalized osteoarthritis (715 00) (M15 9)   12  Generalized pain (780 96) (R52)   13  Glaucoma (365 9) (H40 9)   14  Hypertension (401 9) (I10)   15  Hypothyroidism (244 9) (E03 9)   16  Lumbar canal stenosis (724 02) (M48 06)   17  Major depressive disorder, recurrent, in full remission (296 36) (F33 42)   18  Venous insufficiency (chronic) (peripheral) (459 81) (I87 2)   19  Vitamin D deficiency (268 9) (E55 9)    Past Medical History    1  History of Chronic Non-pressure Ulcer Of The Toes Of The Right Foot (707 15)   2  History of Contusion Of The Right Leg With Intact Skin Surface (924 10)   3   History of anxiety disorder (V11 8) (Z86 59)   4  Denied: History of head injury   5  History of Pain in joint of right shoulder region (719 41) (M20 705)   6  Denied: History of Seizures    Social History    · Never A Smoker   · No alcohol use   · No drug use    Allergies    1  Penicillins   2  Sulfa Drugs   3  Vicodin TABS    Current Meds   1  Artificial Tears (Allscrips) SOLN Recorded   2  BusPIRone HCl - 15 MG Oral Tablet; TAKE 1 TABLET 3 TIMES DAILY; Therapy: 24MLT7906 to (Evaluate:58Ucx5298)  Requested for: 65GTR8449; Last   Rx:08Jan2016 Ordered   3  Citalopram Hydrobromide 40 MG Oral Tablet; TAKE 1 TABLET DAILY; Therapy: 98UOZ3008 to (Evaluate:06Jul2016)  Requested for: 75QSA6444; Last   Rx:08Jan2016 Ordered   4  Levothyroxine Sodium 50 MCG Oral Tablet; Take 1 tablet by mouth once daily at 7am;   Therapy: 72PCF6501 to (Last Rx:29Oct2014)  Requested for: 29Oct2014 Ordered   5  Levoxyl 50 MCG Oral Tablet; TAKE 1 TABLET BY MOUTH ONCE DAILY AT 7AM   (HYPOTHYROIDISM); Therapy: 50OID5178 to (Last Rx:28Oct2014)  Requested for: 28Oct2014 Ordered   6  Loperamide HCl - 2 MG Oral Capsule; TAKE 1 CAPSULE Every 6 hours PRN diarrhea; Therapy: 93JZP9864 to (Salvador Santillan)  Requested for: 28XDL5719; Last   Rx:28Oct2014 Ordered   7  LORazepam 0 5 MG Oral Tablet; TAKE 1 TABLET 3 TIMES DAILY; Therapy: 52QXQ4251 to (Evaluate:82Njv5604)  Requested for: 04XHR3880; Last   Rx:08Jan2016 Ordered   8  Losartan Potassium-HCTZ 100-25 MG Oral Tablet; TAKE 1 TABLET ONCE DAILY; Therapy: 20AVF0151 to (Evaluate:99Pgm4250)  Requested for: 99VNU0017; Last   Rx:25Mar2015 Ordered   9  Lumigan 0 01 % Ophthalmic Solution; Therapy: 32PLP8536 to (Last Rx:25Mar2011)  Requested for: 25Mar2011 Ordered   10  Metoprolol Succinate ER 50 MG Oral Tablet Extended Release 24 Hour; TAKE 1 5    TABLET DAILY AS DIRECTED; Therapy: 42DAK4819 to (Evaluate:01Wuw0280)  Requested for: 70GOH4609; Last    Rx:02Mar2015 Ordered   11   Mucinex DM  MG Oral Tablet Extended Release 12 Hour; TAKE 1 TO 2 TABLETS    EVERY 12 HOURS AS NEEDED; Therapy: 67GZU3149 to (Evaluate:02Apr2013)  Requested for: 28Mar2013; Last    Rx:28Mar2013 Ordered   12  Nystatin 143261 UNIT/GM External Powder; APPLY 2-3 TIMES DAILY TO AFFECTED    AREA(S); Therapy: 29SCW1806 to (Last Rx:01Jun2015)  Requested for: 01Jun2015 Ordered   13  Omeprazole 20 MG Oral Capsule Delayed Release; TAKE 1 CAPSULE DAILY EVERY    MORNING BEFORE BREAKFAST; Therapy: 05LFW8403 to (Evaluate:88Wep8667)  Requested for: 78LYO7300; Last    Rx:25Mar2015 Ordered   14  QUEtiapine Fumarate 100 MG Oral Tablet; TAKE 1 TABLET AT BEDTIME; Therapy: 32AAX5283 to (Evaluate:78Lwd1905)  Requested for: 05WVL6649; Last    Rx:08Jan2016 Ordered   15  QUEtiapine Fumarate 25 MG Oral Tablet; Take 1 tablet daily; Therapy: 59HEL3102 to (Evaluate:59Bcq2611)  Requested for: 53KMW0664; Last    Rx:08Jan2016 Ordered   16  QUEtiapine Fumarate 50 MG Oral Tablet; TAKE 1 TABLET AT BEDTIME; Therapy: 12LFC1590 to (Evaluate:22Byx1992)  Requested for: 30ODQ9898; Last    Rx:08Jan2016 Ordered   17  SEROquel 100 MG Oral Tablet; Therapy: (Francisco Shallow) to Recorded   18  Timolol Maleate 0 5 % Ophthalmic Gel Forming Solution; Therapy: 72ZUG9568 to (Last Rx:25Mar2011)  Requested for: 25Mar2011 Ordered   19  Torsemide 20 MG Oral Tablet; take 1 tablet every other day  Requested for: 14EJA6517; Last Rx:09Jan2015 Ordered   20  Torsemide 20 MG Oral Tablet; take 1 tablet every other day  Requested for: 76YIE5453; Last Rx:27Jan2015 Ordered   21  TraMADol HCl - 50 MG Oral Tablet; TAKE 1 TABLET 3 TIMES DAILY; Therapy: 59Xlv5502 to (Evaluate:06Eqh5724)  Requested for: 15Apr2014; Last    Rx:15Apr2014 Ordered   22  Tylenol Arthritis Ext Relief 650 MG TBCR; 1 tab 3x daily Recorded   23  Vitamin D 2000 UNIT Oral Tablet; One tablet daily; Therapy: 73ITS8646 to (Last Rx:25Mar2015)  Requested for: 25Mar2015 Ordered   24   Zioptan 0 0015 % Ophthalmic Solution;     Therapy: 15GIW1613 to (Evaluate:00Whr1251) Recorded    Signatures   Electronically signed by : Sophia Moe, MSWLCSWTONIA,TERRA; Apr 13 2016 12:09PM EST                       (Author)    Electronically signed by : MIKI Garcia ; Apr 13 2016  2:02PM EST                       (Acknowledgement)

## 2018-03-27 ENCOUNTER — OFFICE VISIT (OUTPATIENT)
Dept: NEPHROLOGY | Facility: CLINIC | Age: 83
End: 2018-03-27
Payer: MEDICARE

## 2018-03-27 VITALS — SYSTOLIC BLOOD PRESSURE: 128 MMHG | DIASTOLIC BLOOD PRESSURE: 60 MMHG

## 2018-03-27 DIAGNOSIS — D64.9 ANEMIA, UNSPECIFIED TYPE: ICD-10-CM

## 2018-03-27 DIAGNOSIS — I50.32 CHRONIC DIASTOLIC CONGESTIVE HEART FAILURE (HCC): ICD-10-CM

## 2018-03-27 DIAGNOSIS — R60.9 EDEMA, UNSPECIFIED TYPE: ICD-10-CM

## 2018-03-27 DIAGNOSIS — E55.9 VITAMIN D DEFICIENCY: ICD-10-CM

## 2018-03-27 DIAGNOSIS — N18.4 STAGE 4 CHRONIC KIDNEY DISEASE (HCC): Primary | ICD-10-CM

## 2018-03-27 DIAGNOSIS — I10 ESSENTIAL HYPERTENSION: ICD-10-CM

## 2018-03-27 PROBLEM — N28.1 RENAL CYST: Status: ACTIVE | Noted: 2017-09-01

## 2018-03-27 PROBLEM — D63.8 ANEMIA OF CHRONIC DISEASE: Status: ACTIVE | Noted: 2017-02-05

## 2018-03-27 PROCEDURE — 99214 OFFICE O/P EST MOD 30 MIN: CPT | Performed by: INTERNAL MEDICINE

## 2018-03-27 NOTE — PROGRESS NOTES
NEPHROLOGY OUTPATIENT PROGRESS NOTE   Karen Mcaadms 80 y o  female MRN: [id number]  DATE: 3/27/2018  Reason for visit:   Chief Complaint   Patient presents with    Chronic Kidney Disease        Patient Instructions   1  CKD IV likely cardiorenal +/- from hypertensive nephrosclerosis +/- physiology of aging: baseline creatinine 1 6-1 9  sCr 1 95 as of 2/9/18  F/u repeat BMP  -She is on ARB and torsemide as listed below  HCTZ discontinued at [place] since last visit  -SPEP/UPEP negative for myeloma  -avoid nephrotoxins/NSAIDs(ibuprofen, aleve, motrin, advil)/fleet enemas  -would recommend avoidance of diclofenac but if this is the only medication that gives relief to hand pain, may continue sparingly   -will monitor proteinuria - repeat UpCr   -renal u/s shows normal echogenicity, 9 3-9 5cm kidneys with complex right renal cyst  -will check urinalysis with microscopy     2  Hypertension: BP well controlled on torsemide 20mg every day, losartan 25mg daily, metoprolol 75mg daily       3  Diastolic CHF(grade 3) and moderate AS(per echo performed 2/2017): on torsemide as above, monitor daily weights  Please send record of daily weights with patient to next visit  Recommend ongoing follow up with cardiology      4  Complex renal cyst: will follow up ultrasound 6 months(April 2018) to assess stablity of complex renal cyst  May need urology follow up if worse on repeat imaging      5  Anemia: Hgb 11 7 as of 2/21/18, continue to monitor CBC     6  Mineral Bone Disease of CKD: Normal phos/PTH levels noted from November 3, 2017  Never had phos/PTH repeated  Needs vitamin D level checked as well  Have ordered this  F/u phos/PTH/vitamin D levels        Diagnoses and all orders for this visit:    Stage 4 chronic kidney disease ([facility] [address] )  -     Basic metabolic panel; Future  -     Phosphorus; Future  -     PTH, intact; Future  -     Vitamin D 25 hydroxy; Future  -     Urinalysis with microscopic;  Future    Chronic diastolic congestive heart failure (HCC)    Anemia, unspecified type    Essential hypertension    Edema, unspecified type    Vitamin D deficiency  -     Vitamin D 25 hydroxy; Future        Assessment/Plan:  1  CKD IV likely cardiorenal +/- from hypertensive nephrosclerosis +/- physiology of aging: baseline creatinine 1 6-1 9  sCr 1 95 as of 2/9/18  F/u repeat BMP  -She is on ARB and torsemide as listed below  HCTZ discontinued at [place] since last visit  -SPEP/UPEP negative for myeloma  -avoid nephrotoxins/NSAIDs(ibuprofen, aleve, motrin, advil)/fleet enemas  -would recommend avoidance of diclofenac but if this is the only medication that gives relief to hand pain, may continue sparingly   -will monitor proteinuria - repeat UpCr   -renal u/s shows normal echogenicity, 9 3-9 5cm kidneys with complex right renal cyst  -will check urinalysis with microscopy     2  Hypertension: BP well controlled on torsemide 20mg every day, losartan 25mg daily, metoprolol 75mg daily       3  Diastolic CHF(grade 3) and moderate AS(per echo performed 2/2017): on torsemide as above, monitor daily weights  Please send record of daily weights with patient to next visit  Recommend ongoing follow up with cardiology      4  Complex renal cyst: will follow up ultrasound 6 months(April 2018) to assess stablity of complex renal cyst  May need urology follow up if worse on repeat imaging      5  Anemia: Hgb 11 7 as of 2/21/18, continue to monitor CBC     6  Mineral Bone Disease of CKD: Normal phos/PTH levels noted from November 3, 2017  Never had phos/PTH repeated  Needs vitamin D level checked as well  Have ordered this  F/u phos/PTH/vitamin D levels      SUBJECTIVE / INTERVAL HISTORY:  80 y o  female presents in follow up of CKD  Steve King denies any recent illness/hospitalizations since last office visit  No longer on HCTZ per [facility] records  Denies NSAID use    She complains of back pain for the last week since being hoisted in the air  Her appetite has been fair  Has lost appetite since PNA a year ago and has not fully gained it back  She says she has lost 50lbs since August 2017  Tyler have LE edema chronically and wears compression stockings  Review of Systems   Constitutional: Negative for chills and fever  HENT: Negative for sore throat and trouble swallowing  Eyes: Negative for visual disturbance  Respiratory: Negative for cough and shortness of breath  Cardiovascular: Positive for leg swelling (+stockings)  Negative for chest pain  Gastrointestinal: Negative for diarrhea, nausea and vomiting  Endocrine: Negative for polyuria  Genitourinary: Negative for difficulty urinating, dysuria and hematuria  Musculoskeletal: Positive for back pain  Negative for neck pain  Skin: Negative for rash  Neurological: Negative for dizziness, light-headedness and numbness  Hematological: Negative for adenopathy  Does not bruise/bleed easily  Psychiatric/Behavioral: The patient is not nervous/anxious  OBJECTIVE:  /60  There is no height or weight on file to calculate BMI  Physical exam:  Physical Exam   Constitutional: She appears well-developed and well-nourished  No distress  In wheelchair   HENT:   Head: Normocephalic and atraumatic  Mouth/Throat: No oropharyngeal exudate  Eyes: Right eye exhibits no discharge  Left eye exhibits no discharge  No scleral icterus  Neck: Normal range of motion  Neck supple  No thyromegaly present  Cardiovascular: Normal rate and regular rhythm  Murmur heard  Pulmonary/Chest: Effort normal and breath sounds normal  No respiratory distress  She has no wheezes  Abdominal: Soft  Bowel sounds are normal  She exhibits no distension  There is no tenderness  Musculoskeletal: She exhibits edema (+compression stockings)  Neurological: She is alert  She exhibits normal muscle tone  awake   Skin: Skin is warm and dry  No rash noted  She is not diaphoretic  Psychiatric: She has a normal mood and affect  Her behavior is normal    Nursing note and vitals reviewed  Medications:    Current Outpatient Prescriptions:     acetaminophen (TYLENOL) 325 mg tablet, Take 2 tablets by mouth every 6 (six) hours as needed for fever, Disp: 30 tablet, Rfl: 0    carbamide peroxide (DEBROX) 6 5 % otic solution, Administer 5 drops into both ears 3 (three) times a week  , Disp: , Rfl:     Cholecalciferol (VITAMIN D3) 2000 UNITS TABS, Take 1 tablet by mouth daily  , Disp: , Rfl:     citalopram (CeleXA) 40 mg tablet, Take 40 mg by mouth daily  , Disp: , Rfl:     docusate sodium (COLACE) 100 mg capsule, Take 1 capsule by mouth 2 (two) times a day, Disp: 10 capsule, Rfl: 0    levothyroxine 50 mcg tablet, Take 50 mcg by mouth daily  , Disp: , Rfl:     loratadine (CLARITIN) 10 mg tablet, Take 10 mg by mouth daily, Disp: , Rfl:     losartan (COZAAR) 25 mg tablet, Take 1 tablet by mouth daily, Disp: , Rfl: 0    metoprolol succinate (TOPROL-XL) 50 mg 24 hr tablet, Take 75 mg by mouth daily  Hold for sbp< 100 or hr <55, Disp: , Rfl:     omeprazole (PriLOSEC) 20 mg delayed release capsule, Take 20 mg by mouth daily  , Disp: , Rfl:     polyethylene glycol (MIRALAX) 17 g packet, Take 17 g by mouth daily, Disp: 14 each, Rfl: 0    QUEtiapine (SEROquel) 25 mg tablet, Take 3 tablets by mouth daily at bedtime, Disp: , Rfl: 0    QUEtiapine (SEROquel) 25 mg tablet, Take 0 5 tablets by mouth daily, Disp: , Rfl: 0    senna (SENOKOT) 8 6 mg, Take 2 tablets by mouth daily at bedtime, Disp: 120 each, Rfl: 0    timolol (TIMOPTIC) 0 5 % ophthalmic solution, Administer 1 drop to both eyes daily, Disp: 10 mL, Rfl: 0    torsemide (DEMADEX) 20 mg tablet, Take 1 tablet by mouth daily, Disp: , Rfl: 0    Allergies:   Allergies as of 03/27/2018 - Reviewed 03/27/2018   Allergen Reaction Noted    Hydrocodone  06/27/2017    Penicillins  02/14/2016    Sulfa antibiotics  02/14/2016    Vicodin [hydrocodone-acetaminophen]  02/14/2016       The following portions of the patient's history were reviewed and updated as appropriate: past family history, past surgical history and problem list     Laboratory Results:  Lab Results   Component Value Date    GLUCOSE 174 (H) 02/09/2018    CALCIUM 8 9 02/09/2018     02/09/2018    K 3 7 02/09/2018    CO2 26 02/09/2018     02/09/2018    BUN 37 (H) 02/09/2018    CREATININE 1 95 (H) 02/09/2018        Lab Results   Component Value Date     2 (H) 06/30/2017    CALCIUM 8 9 02/09/2018    PHOS 3 4 08/16/2017       Portions of the record may have been created with voice recognition software   Occasional wrong word or "sound a like" substitutions may have occurred due to the inherent limitations of voice recognition software   Read the chart carefully and recognize, using context, where substitutions have occurred

## 2018-03-27 NOTE — LETTER
March 27, 2018     Vladislav Ervin MD  [address]    Patient: Meg Mann   YOB: 1932   Date of Visit: 3/27/2018       Dear Dr Jamison Goldberg:    Thank you for referring Matias Her to me for evaluation  Below are my notes for this consultation  If you have questions, please do not hesitate to call me  I look forward to following your patient along with you  Sincerely,        Terra Birtton DO        CC: No Recipients  Terra Britton DO  3/27/2018 10:22 AM  Sign at close encounter  [address] [place] 80 y o  female MRN: [id number]  DATE: 3/27/2018  Reason for visit:   Chief Complaint   Patient presents with    Chronic Kidney Disease        Patient Instructions   1  CKD IV likely cardiorenal +/- from hypertensive nephrosclerosis +/- physiology of aging: baseline creatinine 1 6-1 9  sCr 1 95 as of 2/9/18  F/u repeat BMP  -She is on ARB and torsemide as listed below  HCTZ discontinued at [place] since last visit  -SPEP/UPEP negative for myeloma  -avoid nephrotoxins/NSAIDs(ibuprofen, aleve, motrin, advil)/fleet enemas  -would recommend avoidance of diclofenac but if this is the only medication that gives relief to hand pain, may continue sparingly   -will monitor proteinuria - repeat UpCr   -renal u/s shows normal echogenicity, 9 3-9 5cm kidneys with complex right renal cyst  -will check urinalysis with microscopy     2  Hypertension: BP well controlled on torsemide 20mg every day, losartan 25mg daily, metoprolol 75mg daily       3  Diastolic CHF(grade 3) and moderate AS(per echo performed 2/2017): on torsemide as above, monitor daily weights  Please send record of daily weights with patient to next visit  Recommend ongoing follow up with cardiology      4   Complex renal cyst: will follow up ultrasound 6 months(April 2018) to assess stablity of complex renal cyst  May need urology follow up if worse on repeat imaging      5  Anemia: Hgb 11 7 as of 2/21/18, continue to monitor CBC     6  Mineral Bone Disease of CKD: Normal phos/PTH levels noted from November 3, 2017  Never had phos/PTH repeated  Needs vitamin D level checked as well  Have ordered this  F/u phos/PTH/vitamin D levels        Diagnoses and all orders for this visit:    Stage 4 chronic kidney disease ([facility] [address] )  -     Basic metabolic panel; Future  -     Phosphorus; Future  -     PTH, intact; Future  -     Vitamin D 25 hydroxy; Future  -     Urinalysis with microscopic; Future    Chronic diastolic congestive heart failure (HCC)    Anemia, unspecified type    Essential hypertension    Edema, unspecified type    Vitamin D deficiency  -     Vitamin D 25 hydroxy; Future        Assessment/Plan:  1  CKD IV likely cardiorenal +/- from hypertensive nephrosclerosis +/- physiology of aging: baseline creatinine 1 6-1 9  sCr 1 95 as of 2/9/18  F/u repeat BMP  -She is on ARB and torsemide as listed below  HCTZ discontinued at [address] [place] [address] since last visit  -SPEP/UPEP negative for myeloma  -avoid nephrotoxins/NSAIDs(ibuprofen, aleve, motrin, advil)/fleet enemas  -would recommend avoidance of diclofenac but if this is the only medication that gives relief to hand pain, may continue sparingly   -will monitor proteinuria - repeat UpCr   -renal u/s shows normal echogenicity, 9 3-9 5cm kidneys with complex right renal cyst  -will check urinalysis with microscopy     2  Hypertension: BP well controlled on torsemide 20mg every day, losartan 25mg daily, metoprolol 75mg daily       3  Diastolic CHF(grade 3) and moderate AS(per echo performed 2/2017): on torsemide as above, monitor daily weights  Please send record of daily weights with patient to next visit  Recommend ongoing follow up with cardiology      4  Complex renal cyst: will follow up ultrasound 6 months(April 2018) to assess stablity of complex renal cyst  May need urology follow up if worse on repeat imaging      5  Anemia: Hgb 11 7 as of 2/21/18, continue to monitor CBC     6  Mineral Bone Disease of CKD: Normal phos/PTH levels noted from November 3, 2017  Never had phos/PTH repeated  Needs vitamin D level checked as well  Have ordered this  F/u phos/PTH/vitamin D levels      SUBJECTIVE / INTERVAL HISTORY:  80 y o  female presents in follow up of CKD  Kim Alvarado denies any recent illness/hospitalizations since last office visit  No longer on HCTZ per [place] records  Denies NSAID use  She complains of back pain for the last week since being hoisted in the air  Her appetite has been fair  Has lost appetite since PNA a year ago and has not fully gained it back  She says she has lost 50lbs since August 2017  Tyler have LE edema chronically and wears compression stockings  Review of Systems   Constitutional: Negative for chills and fever  HENT: Negative for sore throat and trouble swallowing  Eyes: Negative for visual disturbance  Respiratory: Negative for cough and shortness of breath  Cardiovascular: Positive for leg swelling (+stockings)  Negative for chest pain  Gastrointestinal: Negative for diarrhea, nausea and vomiting  Endocrine: Negative for polyuria  Genitourinary: Negative for difficulty urinating, dysuria and hematuria  Musculoskeletal: Positive for back pain  Negative for neck pain  Skin: Negative for rash  Neurological: Negative for dizziness, light-headedness and numbness  Hematological: Negative for adenopathy  Does not bruise/bleed easily  Psychiatric/Behavioral: The patient is not nervous/anxious  OBJECTIVE:  /60  There is no height or weight on file to calculate BMI  Physical exam:  Physical Exam   Constitutional: She appears well-developed and well-nourished  No distress  In wheelchair   HENT:   Head: Normocephalic and atraumatic  Mouth/Throat: No oropharyngeal exudate  Eyes: Right eye exhibits no discharge  Left eye exhibits no discharge   No scleral icterus  Neck: Normal range of motion  Neck supple  No thyromegaly present  Cardiovascular: Normal rate and regular rhythm  Murmur heard  Pulmonary/Chest: Effort normal and breath sounds normal  No respiratory distress  She has no wheezes  Abdominal: Soft  Bowel sounds are normal  She exhibits no distension  There is no tenderness  Musculoskeletal: She exhibits edema (+compression stockings)  Neurological: She is alert  She exhibits normal muscle tone  awake   Skin: Skin is warm and dry  No rash noted  She is not diaphoretic  Psychiatric: She has a normal mood and affect  Her behavior is normal    Nursing note and vitals reviewed  Medications:    Current Outpatient Prescriptions:     acetaminophen (TYLENOL) 325 mg tablet, Take 2 tablets by mouth every 6 (six) hours as needed for fever, Disp: 30 tablet, Rfl: 0    carbamide peroxide (DEBROX) 6 5 % otic solution, Administer 5 drops into both ears 3 (three) times a week  , Disp: , Rfl:     Cholecalciferol (VITAMIN D3) 2000 UNITS TABS, Take 1 tablet by mouth daily  , Disp: , Rfl:     citalopram (CeleXA) 40 mg tablet, Take 40 mg by mouth daily  , Disp: , Rfl:     docusate sodium (COLACE) 100 mg capsule, Take 1 capsule by mouth 2 (two) times a day, Disp: 10 capsule, Rfl: 0    levothyroxine 50 mcg tablet, Take 50 mcg by mouth daily  , Disp: , Rfl:     loratadine (CLARITIN) 10 mg tablet, Take 10 mg by mouth daily, Disp: , Rfl:     losartan (COZAAR) 25 mg tablet, Take 1 tablet by mouth daily, Disp: , Rfl: 0    metoprolol succinate (TOPROL-XL) 50 mg 24 hr tablet, Take 75 mg by mouth daily  Hold for sbp< 100 or hr <55, Disp: , Rfl:     omeprazole (PriLOSEC) 20 mg delayed release capsule, Take 20 mg by mouth daily  , Disp: , Rfl:     polyethylene glycol (MIRALAX) 17 g packet, Take 17 g by mouth daily, Disp: 14 each, Rfl: 0    QUEtiapine (SEROquel) 25 mg tablet, Take 3 tablets by mouth daily at bedtime, Disp: , Rfl: 0    QUEtiapine (SEROquel) 25 mg tablet, Take 0 5 tablets by mouth daily, Disp: , Rfl: 0    senna (SENOKOT) 8 6 mg, Take 2 tablets by mouth daily at bedtime, Disp: 120 each, Rfl: 0    timolol (TIMOPTIC) 0 5 % ophthalmic solution, Administer 1 drop to both eyes daily, Disp: 10 mL, Rfl: 0    torsemide (DEMADEX) 20 mg tablet, Take 1 tablet by mouth daily, Disp: , Rfl: 0    Allergies: Allergies as of 03/27/2018 - Reviewed 03/27/2018   Allergen Reaction Noted    Hydrocodone  06/27/2017    Penicillins  02/14/2016    Sulfa antibiotics  02/14/2016    Vicodin [hydrocodone-acetaminophen]  02/14/2016       The following portions of the patient's history were reviewed and updated as appropriate: past family history, past surgical history and problem list     Laboratory Results:  Lab Results   Component Value Date    GLUCOSE 174 (H) 02/09/2018    CALCIUM 8 9 02/09/2018     02/09/2018    K 3 7 02/09/2018    CO2 26 02/09/2018     02/09/2018    BUN 37 (H) 02/09/2018    CREATININE 1 95 (H) 02/09/2018        Lab Results   Component Value Date     2 (H) 06/30/2017    CALCIUM 8 9 02/09/2018    PHOS 3 4 08/16/2017       Portions of the record may have been created with voice recognition software   Occasional wrong word or "sound a like" substitutions may have occurred due to the inherent limitations of voice recognition software   Read the chart carefully and recognize, using context, where substitutions have occurred

## 2018-03-27 NOTE — PATIENT INSTRUCTIONS
1  CKD IV likely cardiorenal +/- from hypertensive nephrosclerosis +/- physiology of aging: baseline creatinine 1 6-1 9  sCr 1 95 as of 2/9/18  F/u repeat BMP  -She is on ARB and torsemide as listed below  HCTZ discontinued at [place] since last visit  -SPEP/UPEP negative for myeloma  -avoid nephrotoxins/NSAIDs(ibuprofen, aleve, motrin, advil)/fleet enemas  -would recommend avoidance of diclofenac but if this is the only medication that gives relief to hand pain, may continue sparingly   -will monitor proteinuria - repeat UpCr   -renal u/s shows normal echogenicity, 9 3-9 5cm kidneys with complex right renal cyst  -will check urinalysis with microscopy     2  Hypertension: BP well controlled on torsemide 20mg every day, losartan 25mg daily, metoprolol 75mg daily       3  Diastolic CHF(grade 3) and moderate AS(per echo performed 2/2017): on torsemide as above, monitor daily weights  Please send record of daily weights with patient to next visit  Recommend ongoing follow up with cardiology      4  Complex renal cyst: will follow up ultrasound 6 months(April 2018) to assess stablity of complex renal cyst  May need urology follow up if worse on repeat imaging      5  Anemia: Hgb 11 7 as of 2/21/18, continue to monitor CBC     6  Mineral Bone Disease of CKD: Normal phos/PTH levels noted from November 3, 2017  Never had phos/PTH repeated  Needs vitamin D level checked as well  Have ordered this   F/u phos/PTH/vitamin D levels

## 2018-04-02 DIAGNOSIS — N18.4 CHRONIC KIDNEY DISEASE, STAGE IV (SEVERE) (HCC): ICD-10-CM

## 2018-04-02 DIAGNOSIS — N28.1 ACQUIRED CYST OF KIDNEY: ICD-10-CM

## 2018-04-09 ENCOUNTER — OFFICE VISIT (OUTPATIENT)
Dept: CARDIOLOGY CLINIC | Facility: CLINIC | Age: 83
End: 2018-04-09
Payer: MEDICARE

## 2018-04-09 VITALS
HEIGHT: 63 IN | OXYGEN SATURATION: 94 % | DIASTOLIC BLOOD PRESSURE: 70 MMHG | SYSTOLIC BLOOD PRESSURE: 124 MMHG | HEART RATE: 64 BPM

## 2018-04-09 DIAGNOSIS — I44.7 LBBB (LEFT BUNDLE BRANCH BLOCK): ICD-10-CM

## 2018-04-09 DIAGNOSIS — I50.32 CHRONIC DIASTOLIC CHF (CONGESTIVE HEART FAILURE) (HCC): Primary | ICD-10-CM

## 2018-04-09 DIAGNOSIS — I35.0 NONRHEUMATIC AORTIC VALVE STENOSIS: ICD-10-CM

## 2018-04-09 DIAGNOSIS — I10 ESSENTIAL HYPERTENSION: ICD-10-CM

## 2018-04-09 PROCEDURE — 99214 OFFICE O/P EST MOD 30 MIN: CPT | Performed by: INTERNAL MEDICINE

## 2018-04-09 PROCEDURE — 93000 ELECTROCARDIOGRAM COMPLETE: CPT | Performed by: INTERNAL MEDICINE

## 2018-04-09 RX ORDER — SERTRALINE HYDROCHLORIDE 100 MG/1
150 TABLET, FILM COATED ORAL DAILY
COMMUNITY

## 2018-04-09 RX ORDER — BUSPIRONE HYDROCHLORIDE 15 MG/1
15 TABLET ORAL 3 TIMES DAILY
COMMUNITY

## 2018-04-09 NOTE — PROGRESS NOTES
Cardiology Consultation     Prateek Vernon  [de-identified]  2/3/1932  [facility] [address]  [place] [address] [facility] [address]      1  Chronic diastolic CHF (congestive heart failure) (HCC)  POCT ECG    Echo complete with contrast if indicated   2  LBBB (left bundle branch block)     3  Essential hypertension     4  Nonrheumatic aortic valve stenosis  Echo complete with contrast if indicated       Discussion/Summary:    68-year-old female  Aortic stenosis, which was moderate on last echocardiogram done in February of 2017  At this point, she remains asymptomatic  Discussed the mechanism of this with her  She currently is wheelchair bound, and transfers with a whole year lift  I discussed with her that we should monitor for any signs of volume overload and adjust diuretic therapy as needed  Be on this, if the aortic stenosis were to progress, I did indicate to her that any treatment would be procedure or surgically based  She is neither sure she would want this, nor MI sure if she would be a ideal candidate for this given that she is mostly wheelchair bound  Regardless, there is no indication to evaluate for this currently  She is aware of things to watch out for knows when to contact me  I did recommend she have a repeat echocardiogram in 1 year prior to her next visit  Prior history of a left bundle branch block  Not present on the EKG today  This was in the setting previously of multilobar pneumonia and a type 2 non ST elevation MI   LV function was preserved overall  No adjustments to her diuretic dosing  Follow up 1 year  History of Present Illness:    68-year-old female  She was seen previously by me when she was admitted to the hospital for a multilobar pneumonia  She had a left bundle branch block at that time   She also had aortic stenosis which previously has been moderate, and a repeat echocardiogram which was performed in February of 2017 also showed moderate AS  She has not had regular cardiology follow-up, but has been asymptomatic  She previously was at a different facility, but now lives at [place]  She has been there since a toe fracture  She did attempt rehab, but was not very successful in regaining mobility  She is more recently now been in a wheelchair all the time, and transfers with a Kulwinder lift  At rest, she denies any shortness of breath  She has no PND orthopnea  She denies any significant lower extremity edema  She has no chest pain or palpitations  She gets her medications given to her by her facility, and she is not very aware of them  However, she denies any significant changes  Patient Active Problem List   Diagnosis    COPD exacerbation (HCC)    Chronic diastolic congestive heart failure (HCC)    LBBB (left bundle branch block)    Hypothyroid    Hyperglycemia    Anemia of chronic disease    Depression    Recurrent falls    Ambulatory dysfunction    Physical deconditioning    Visual impairment    Subdural hygroma    Obesity    Essential hypertension    GERD (gastroesophageal reflux disease)    Arthritis    Nonrheumatic aortic valve stenosis    Fracture of phalanx of toe of left foot    CKD (chronic kidney disease), stage IV (HCC)    Edema    Renal cyst    Vitamin D deficiency     Past Medical History:   Diagnosis Date    Anxiety     Aortic stenosis     Aortic stenosis     Arthritis     Chronic anemia     Chronic venous insufficiency     CKD (chronic kidney disease)     Disease of thyroid gland     Dry eye     GERD (gastroesophageal reflux disease)     Glaucoma     Hypertension     Hypothyroidism     Lumbar spinal stenosis     Obesity     Osteoarthritis      Social History     Social History    Marital status:      Spouse name: N/A    Number of children: N/A    Years of education: N/A     Occupational History    Not on file       Social History Main Topics    Smoking status: Never Smoker    Smokeless tobacco: Never Used    Alcohol use No    Drug use: No    Sexual activity: Not on file     Other Topics Concern    Not on file     Social History Narrative    No narrative on file      Family History   Problem Relation Age of Onset    Heart attack Maternal Grandmother     Heart disease Mother     Heart disease Father      Past Surgical History:   Procedure Laterality Date    BACK SURGERY      HYSTERECTOMY      TONSILLECTOMY         Current Outpatient Prescriptions:     acetaminophen (TYLENOL) 325 mg tablet, Take 2 tablets by mouth every 6 (six) hours as needed for fever (Patient taking differently: Take 1,000 mg by mouth daily  ), Disp: 30 tablet, Rfl: 0    busPIRone (BUSPAR) 15 mg tablet, Take 15 mg by mouth 3 (three) times a day, Disp: , Rfl:     Cholecalciferol (VITAMIN D3) 2000 UNITS TABS, Take 1 tablet by mouth daily  , Disp: , Rfl:     docusate sodium (COLACE) 100 mg capsule, Take 1 capsule by mouth 2 (two) times a day, Disp: 10 capsule, Rfl: 0    levothyroxine 50 mcg tablet, Take 50 mcg by mouth daily  , Disp: , Rfl:     loratadine (CLARITIN) 10 mg tablet, Take 10 mg by mouth daily, Disp: , Rfl:     losartan (COZAAR) 25 mg tablet, Take 1 tablet by mouth daily, Disp: , Rfl: 0    metoprolol succinate (TOPROL-XL) 50 mg 24 hr tablet, Take 75 mg by mouth daily  Hold for sbp< 100 or hr <55, Disp: , Rfl:     omeprazole (PriLOSEC) 20 mg delayed release capsule, Take 20 mg by mouth daily  , Disp: , Rfl:     OxyCODONE HCl 5 MG TABA, Take 2 5 mg by mouth every 6 (six) hours as needed, Disp: , Rfl:     QUEtiapine (SEROquel) 25 mg tablet, Take 3 tablets by mouth daily at bedtime (Patient taking differently: Take 50 mg by mouth daily at bedtime  ), Disp: , Rfl: 0    senna (SENOKOT) 8 6 mg, Take 2 tablets by mouth daily at bedtime (Patient taking differently: Take 1 tablet by mouth daily at bedtime  ), Disp: 120 each, Rfl: 0    sertraline (ZOLOFT) 100 mg tablet, Take 100 mg by mouth daily, Disp: , Rfl:     torsemide (DEMADEX) 20 mg tablet, Take 1 tablet by mouth daily, Disp: , Rfl: 0  Allergies   Allergen Reactions    Hydrocodone     Penicillins     Sulfa Antibiotics     Vicodin [Hydrocodone-Acetaminophen]        Vitals:    04/09/18 1419   BP: 124/70   BP Location: Left arm   Patient Position: Sitting   Cuff Size: Large   Pulse: 64   SpO2: 94%   Height: 5' 3" (1 6 m)     There were no vitals filed for this visit  Height: 5' 3" (160 cm)   There is no height or weight on file to calculate BMI  Physical Exam:  GENERAL: Alert  In a wheelchair  HEENT:  PERRL, EOMI, no scleral icterus, no conjunctival pallor  NECK:  Supple, No elevated JVP, no thyromegaly, no carotid bruits  HEART:  Regular  Decreased S2  3/6 GLADIS  LUNGS:  Clear to auscultation bilaterally  ABDOMEN:  Obese, soft, nontender  EXTREMITIES:  No pitting edema  SKIN: Normal without suspicious lesions on exposed skin    ROS:  Positive for back pain, vision changes, arthritis, hand pain, dry skin, allergies, constipation,  Except as noted in HPI, is otherwise reviewed in detail and a 12 point review of systems is negative  Labs:  Lab Results   Component Value Date     02/09/2018    K 3 7 02/09/2018     02/09/2018    CREATININE 1 95 (H) 02/09/2018    BUN 37 (H) 02/09/2018    CO2 26 02/09/2018    ALT 13 02/09/2018    AST 14 02/09/2018    INR 0 99 06/27/2017    WBC 7 26 02/09/2018    HGB 8 9 (L) 02/09/2018    HCT 29 2 (L) 02/09/2018     02/09/2018       No results found for: CHOL  No results found for: HDL  No results found for: LDLCALC  No results found for: TRIG    Testing:  Echo 2/5/17:  LEFT VENTRICLE:  Systolic function was normal by visual assessment  Ejection fraction was estimated in the range of 50 % to 60 %  This study was inadequate for the evaluation of regional wall motion  Wall thickness was mildly increased  There was mild concentric hypertrophy    Doppler parameters were consistent with a reversible restrictive pattern, indicative of decreased left ventricular diastolic compliance and/or increased left atrial pressure (grade 3 diastolic dysfunction)      LEFT ATRIUM:  The atrium was mildly dilated      MITRAL VALVE:  There was moderate annular calcification      AORTIC VALVE:  Transaortic velocity was increased due to valvular stenosis  There was moderate stenosis  Valve mean gradient was 20 mmHg      TRICUSPID VALVE:  There was trace regurgitation  EKG:  Sinus rhythm  First-degree AV block  Poor anterior R-wave progression    QRS 74ms

## 2018-04-09 NOTE — LETTER
April 9, 2018     Leatha Cogan, MD  [address]    Patient: Jarrett Pettit   YOB: 1932   Date of Visit: 4/9/2018       Dear Dr Annamaria Baumgarten:    Thank you for referring Justin Marshall to me for evaluation  Below are my notes for this consultation  If you have questions, please do not hesitate to call me  I look forward to following your patient along with you  Sincerely,        Darrin Murillo MD        CC: No Recipients  Darrin Murillo MD  4/9/2018  3:09 PM  Sign at close encounter  Cardiology Consultation     Jarrett Pettit  [de-identified]  2/3/1932  [facility]  [address] [address]      1  Chronic diastolic CHF (congestive heart failure) (HCC)  POCT ECG    Echo complete with contrast if indicated   2  LBBB (left bundle branch block)     3  Essential hypertension     4  Nonrheumatic aortic valve stenosis  Echo complete with contrast if indicated       Discussion/Summary:    68-year-old female  Aortic stenosis, which was moderate on last echocardiogram done in February of 2017  At this point, she remains asymptomatic  Discussed the mechanism of this with her  She currently is wheelchair bound, and transfers with a whole year lift  I discussed with her that we should monitor for any signs of volume overload and adjust diuretic therapy as needed  Be on this, if the aortic stenosis were to progress, I did indicate to her that any treatment would be procedure or surgically based  She is neither sure she would want this, nor MI sure if she would be a ideal candidate for this given that she is mostly wheelchair bound  Regardless, there is no indication to evaluate for this currently  She is aware of things to watch out for knows when to contact me  I did recommend she have a repeat echocardiogram in 1 year prior to her next visit      Prior history of a left bundle branch block  Not present on the EKG today  This was in the setting previously of multilobar pneumonia and a type 2 non ST elevation MI   LV function was preserved overall  No adjustments to her diuretic dosing  Follow up 1 year  History of Present Illness:    49-year-old female  She was seen previously by me when she was admitted to the hospital for a multilobar pneumonia  She had a left bundle branch block at that time  She also had aortic stenosis which previously has been moderate, and a repeat echocardiogram which was performed in February of 2017 also showed moderate AS  She has not had regular cardiology follow-up, but has been asymptomatic  She previously was at a different facility, but now lives at [facility]  She has been there since a toe fracture  She did attempt rehab, but was not very successful in regaining mobility  She is more recently now been in a wheelchair all the time, and transfers with a Kulwinder lift  At rest, she denies any shortness of breath  She has no PND orthopnea  She denies any significant lower extremity edema  She has no chest pain or palpitations  She gets her medications given to her by her facility, and she is not very aware of them  However, she denies any significant changes      Patient Active Problem List   Diagnosis    COPD exacerbation (HCC)    Chronic diastolic congestive heart failure (HCC)    LBBB (left bundle branch block)    Hypothyroid    Hyperglycemia    Anemia of chronic disease    Depression    Recurrent falls    Ambulatory dysfunction    Physical deconditioning    Visual impairment    Subdural hygroma    Obesity    Essential hypertension    GERD (gastroesophageal reflux disease)    Arthritis    Nonrheumatic aortic valve stenosis    Fracture of phalanx of toe of left foot    CKD (chronic kidney disease), stage IV (HCC)    Edema    Renal cyst    Vitamin D deficiency     Past Medical History:   Diagnosis Date    Anxiety  Aortic stenosis     Aortic stenosis     Arthritis     Chronic anemia     Chronic venous insufficiency     CKD (chronic kidney disease)     Disease of thyroid gland     Dry eye     GERD (gastroesophageal reflux disease)     Glaucoma     Hypertension     Hypothyroidism     Lumbar spinal stenosis     Obesity     Osteoarthritis      Social History     Social History    Marital status:      Spouse name: N/A    Number of children: N/A    Years of education: N/A     Occupational History    Not on file  Social History Main Topics    Smoking status: Never Smoker    Smokeless tobacco: Never Used    Alcohol use No    Drug use: No    Sexual activity: Not on file     Other Topics Concern    Not on file     Social History Narrative    No narrative on file      Family History   Problem Relation Age of Onset    Heart attack Maternal Grandmother     Heart disease Mother     Heart disease Father      Past Surgical History:   Procedure Laterality Date    BACK SURGERY      HYSTERECTOMY      TONSILLECTOMY         Current Outpatient Prescriptions:     acetaminophen (TYLENOL) 325 mg tablet, Take 2 tablets by mouth every 6 (six) hours as needed for fever (Patient taking differently: Take 1,000 mg by mouth daily  ), Disp: 30 tablet, Rfl: 0    busPIRone (BUSPAR) 15 mg tablet, Take 15 mg by mouth 3 (three) times a day, Disp: , Rfl:     Cholecalciferol (VITAMIN D3) 2000 UNITS TABS, Take 1 tablet by mouth daily  , Disp: , Rfl:     docusate sodium (COLACE) 100 mg capsule, Take 1 capsule by mouth 2 (two) times a day, Disp: 10 capsule, Rfl: 0    levothyroxine 50 mcg tablet, Take 50 mcg by mouth daily  , Disp: , Rfl:     loratadine (CLARITIN) 10 mg tablet, Take 10 mg by mouth daily, Disp: , Rfl:     losartan (COZAAR) 25 mg tablet, Take 1 tablet by mouth daily, Disp: , Rfl: 0    metoprolol succinate (TOPROL-XL) 50 mg 24 hr tablet, Take 75 mg by mouth daily   Hold for sbp< 100 or hr <55, Disp: , Rfl:     omeprazole (PriLOSEC) 20 mg delayed release capsule, Take 20 mg by mouth daily  , Disp: , Rfl:     OxyCODONE HCl 5 MG TABA, Take 2 5 mg by mouth every 6 (six) hours as needed, Disp: , Rfl:     QUEtiapine (SEROquel) 25 mg tablet, Take 3 tablets by mouth daily at bedtime (Patient taking differently: Take 50 mg by mouth daily at bedtime  ), Disp: , Rfl: 0    senna (SENOKOT) 8 6 mg, Take 2 tablets by mouth daily at bedtime (Patient taking differently: Take 1 tablet by mouth daily at bedtime  ), Disp: 120 each, Rfl: 0    sertraline (ZOLOFT) 100 mg tablet, Take 100 mg by mouth daily, Disp: , Rfl:     torsemide (DEMADEX) 20 mg tablet, Take 1 tablet by mouth daily, Disp: , Rfl: 0  Allergies   Allergen Reactions    Hydrocodone     Penicillins     Sulfa Antibiotics     Vicodin [Hydrocodone-Acetaminophen]        Vitals:    04/09/18 1419   BP: 124/70   BP Location: Left arm   Patient Position: Sitting   Cuff Size: Large   Pulse: 64   SpO2: 94%   Height: 5' 3" (1 6 m)     There were no vitals filed for this visit  Height: 5' 3" (160 cm)   There is no height or weight on file to calculate BMI  Physical Exam:  GENERAL: Alert  In a wheelchair  HEENT:  PERRL, EOMI, no scleral icterus, no conjunctival pallor  NECK:  Supple, No elevated JVP, no thyromegaly, no carotid bruits  HEART:  Regular  Decreased S2  3/6 GLADIS  LUNGS:  Clear to auscultation bilaterally  ABDOMEN:  Obese, soft, nontender  EXTREMITIES:  No pitting edema  SKIN: Normal without suspicious lesions on exposed skin    ROS:  Positive for back pain, vision changes, arthritis, hand pain, dry skin, allergies, constipation,  Except as noted in HPI, is otherwise reviewed in detail and a 12 point review of systems is negative      Labs:  Lab Results   Component Value Date     02/09/2018    K 3 7 02/09/2018     02/09/2018    CREATININE 1 95 (H) 02/09/2018    BUN 37 (H) 02/09/2018    CO2 26 02/09/2018    ALT 13 02/09/2018    AST 14 02/09/2018 INR 0 99 06/27/2017    WBC 7 26 02/09/2018    HGB 8 9 (L) 02/09/2018    HCT 29 2 (L) 02/09/2018     02/09/2018       No results found for: CHOL  No results found for: HDL  No results found for: LDLCALC  No results found for: TRIG    Testing:  Echo 2/5/17:  LEFT VENTRICLE:  Systolic function was normal by visual assessment  Ejection fraction was estimated in the range of 50 % to 60 %  This study was inadequate for the evaluation of regional wall motion  Wall thickness was mildly increased  There was mild concentric hypertrophy  Doppler parameters were consistent with a reversible restrictive pattern, indicative of decreased left ventricular diastolic compliance and/or increased left atrial pressure (grade 3 diastolic dysfunction)      LEFT ATRIUM:  The atrium was mildly dilated      MITRAL VALVE:  There was moderate annular calcification      AORTIC VALVE:  Transaortic velocity was increased due to valvular stenosis  There was moderate stenosis  Valve mean gradient was 20 mmHg      TRICUSPID VALVE:  There was trace regurgitation  EKG:  Sinus rhythm  First-degree AV block  Poor anterior R-wave progression    QRS 74ms

## 2018-05-03 ENCOUNTER — TELEPHONE (OUTPATIENT)
Dept: PAIN MEDICINE | Facility: CLINIC | Age: 83
End: 2018-05-03

## 2018-05-03 NOTE — TELEPHONE ENCOUNTER
Deidre from [facility] is calling wondering what needs to be given because she has tried to fax things three times, has gotten three different fax numbers and is just trying to get the patient in with the doctor      cb- [phone]

## 2018-05-03 NOTE — TELEPHONE ENCOUNTER
Enrico Saenz from [employer] called requesting to make an appt  Was transferred to Katherine Sánchez who then transferred me to [facility] to do an intake form  Intake started and will be sent to [place] for Dr Jono Rodriguez will fax over med list, office notes and MD order

## 2018-05-04 NOTE — TELEPHONE ENCOUNTER
Intake was sent over to [facility] office 5/3  Analilia Mayberry from [facility] called and was informed once Dr Jannie Holt reviews records someone will call pt to set appt  Analilia Mayberry will like someone to call this number [phone] to make an appt

## 2018-05-23 ENCOUNTER — CONSULT (OUTPATIENT)
Dept: PAIN MEDICINE | Facility: CLINIC | Age: 83
End: 2018-05-23
Payer: MEDICARE

## 2018-05-23 VITALS — TEMPERATURE: 98 F | DIASTOLIC BLOOD PRESSURE: 62 MMHG | HEART RATE: 76 BPM | SYSTOLIC BLOOD PRESSURE: 128 MMHG

## 2018-05-23 DIAGNOSIS — M79.18 MYOFASCIAL PAIN SYNDROME: Primary | ICD-10-CM

## 2018-05-23 PROCEDURE — 99204 OFFICE O/P NEW MOD 45 MIN: CPT | Performed by: ANESTHESIOLOGY

## 2018-05-23 NOTE — PROGRESS NOTES
Assessment:  1  Myofascial pain syndrome        Plan:  The patient's symptoms, history/physical are consistent with pain that appears to be primarily myofascial in nature  At this time, I will start her on methocarbamol 500 mg at bedtime to help with the spasms  She was apprised of themost common side effects including sleepiness and dizziness  She will call with an update in 2 weeks on how the medication is working  My impressions and treatment recommendations were discussed in detail with the patient who verbalized understanding and had no further questions  Discharge instructions were provided  I personally saw and examined the patient and I agree with the above discussed plan of care  No orders of the defined types were placed in this encounter  No orders of the defined types were placed in this encounter  History of Present Illness:    Sid Rebollar is a 80 y o  female who presents for consultation in regards to back pain  Symptoms started 1 month ago while she was being mechanically lifted in a [employer] lift  Pain is moderate to severe rated 6-8/10 on numeric rating scale and felt nearly constantly but particular when she is being transferred  Symptoms are described to be sharp, throbbing and shooting in the back but worst in the lower back  Pain is aggravated with bending  There is no change with coughing, sneezing or bowel movements  She is currently on oxycodone 5 mg 2 5 tablets every 4 hours as needed for pain  I have personally reviewed and/or updated the patient's past medical history, past surgical history, family history, social history, current medications, allergies, and vital signs today  Review of Systems:    Review of Systems   Constitutional: Positive for unexpected weight change  Negative for fever  HENT: Negative for trouble swallowing  Eyes: Negative for visual disturbance  Respiratory: Negative for shortness of breath and wheezing  Cardiovascular: Negative for chest pain and palpitations  Gastrointestinal: Negative for constipation, diarrhea, nausea and vomiting  Endocrine: Negative for cold intolerance, heat intolerance and polydipsia  Genitourinary: Negative for difficulty urinating and frequency  Musculoskeletal: Positive for gait problem and joint swelling  Negative for arthralgias and myalgias  Skin: Negative for rash  Neurological: Negative for dizziness, seizures, syncope, weakness and headaches  Hematological: Does not bruise/bleed easily  Psychiatric/Behavioral: Positive for behavioral problems  Negative for dysphoric mood  All other systems reviewed and are negative        Patient Active Problem List   Diagnosis    COPD exacerbation (HCC)    Chronic diastolic congestive heart failure (HCC)    LBBB (left bundle branch block)    Hypothyroid    Hyperglycemia    Anemia of chronic disease    Depression    Recurrent falls    Ambulatory dysfunction    Physical deconditioning    Visual impairment    Subdural hygroma    Obesity    Essential hypertension    GERD (gastroesophageal reflux disease)    Arthritis    Nonrheumatic aortic valve stenosis    Fracture of phalanx of toe of left foot    CKD (chronic kidney disease), stage IV ([facility])    Edema    Renal cyst    Vitamin D deficiency       Past Medical History:   Diagnosis Date    Anxiety     Aortic stenosis     Aortic stenosis     Arthritis     Chronic anemia     Chronic venous insufficiency     CKD (chronic kidney disease)     Disease of thyroid gland     Dry eye     GERD (gastroesophageal reflux disease)     Glaucoma     Hypertension     Hypothyroidism     Lumbar spinal stenosis     Obesity     Osteoarthritis        Past Surgical History:   Procedure Laterality Date    BACK SURGERY      HYSTERECTOMY      TONSILLECTOMY         Family History   Problem Relation Age of Onset    Heart attack Maternal Grandmother     Heart disease Mother  Heart disease Father        Social History     Occupational History    Not on file  Social History Main Topics    Smoking status: Never Smoker    Smokeless tobacco: Never Used    Alcohol use No    Drug use: No    Sexual activity: Not on file       Current Outpatient Prescriptions on File Prior to Visit   Medication Sig    acetaminophen (TYLENOL) 325 mg tablet Take 2 tablets by mouth every 6 (six) hours as needed for fever (Patient taking differently: Take 1,000 mg by mouth daily  )    busPIRone (BUSPAR) 15 mg tablet Take 15 mg by mouth 3 (three) times a day    Cholecalciferol (VITAMIN D3) 2000 UNITS TABS Take 1 tablet by mouth daily   docusate sodium (COLACE) 100 mg capsule Take 1 capsule by mouth 2 (two) times a day    levothyroxine 50 mcg tablet Take 50 mcg by mouth daily   loratadine (CLARITIN) 10 mg tablet Take 10 mg by mouth daily    losartan (COZAAR) 25 mg tablet Take 1 tablet by mouth daily    metoprolol succinate (TOPROL-XL) 50 mg 24 hr tablet Take 75 mg by mouth daily  Hold for sbp< 100 or hr <55    omeprazole (PriLOSEC) 20 mg delayed release capsule Take 20 mg by mouth daily   OxyCODONE HCl 5 MG TABA Take 2 5 mg by mouth every 6 (six) hours as needed    QUEtiapine (SEROquel) 25 mg tablet Take 3 tablets by mouth daily at bedtime (Patient taking differently: Take 50 mg by mouth daily at bedtime  )    senna (SENOKOT) 8 6 mg Take 2 tablets by mouth daily at bedtime (Patient taking differently: Take 1 tablet by mouth daily at bedtime  )    sertraline (ZOLOFT) 100 mg tablet Take 100 mg by mouth daily    torsemide (DEMADEX) 20 mg tablet Take 1 tablet by mouth daily     No current facility-administered medications on file prior to visit          Allergies   Allergen Reactions    Hydrocodone     Penicillins     Sulfa Antibiotics     Vicodin [Hydrocodone-Acetaminophen]        Physical Exam:    /62   Pulse 76   Temp 98 °F (36 7 °C) (Oral)     Constitutional: normal, well developed, well nourished, alert, in no distress and non-toxic and no overt pain behavior   and obese  Eyes: anicteric  HEENT: grossly intact  Neck: supple, symmetric, trachea midline and no masses   Pulmonary:even and unlabored  Cardiovascular:No edema or pitting edema present  Skin:Normal without rashes or lesions and well hydrated  Psychiatric:Mood and affect appropriate  Neurologic:Cranial Nerves II-XII grossly intact  Musculoskeletal:in wheelchair     Lumbar Spine Exam  Appearance:  Normal lordosis  Palpation/Tenderness:  left lumbar paraspinal tenderness  right lumbar paraspinal tenderness  Sensory:  no sensory deficits noted  Range of Motion: Deferred  Motor Strength:  Left hip flexion:  5/5  Left hip extension:  5/5  Right hip flexion:  5/5  Right hip extension:  5/5  Left knee flexion:  5/5  Left knee extension:  5/5  Right knee flexion:  5/5  Right knee extension:  5/5  Left foot dorsiflexion:  5/5  Left foot plantar flexion:  5/5  Right foot dorsiflexion:  5/5  Right foot plantar flexion:  5/5  Reflexes:  Left Patellar:  1+   Right Patellar:  1+   Left Achilles:  1+   Right Achilles:  1+   Special Tests: Deferred

## 2018-06-05 ENCOUNTER — OFFICE VISIT (OUTPATIENT)
Dept: NEPHROLOGY | Facility: CLINIC | Age: 83
End: 2018-06-05
Payer: MEDICARE

## 2018-06-05 VITALS — SYSTOLIC BLOOD PRESSURE: 142 MMHG | DIASTOLIC BLOOD PRESSURE: 62 MMHG | HEART RATE: 76 BPM

## 2018-06-05 DIAGNOSIS — E55.9 VITAMIN D DEFICIENCY: ICD-10-CM

## 2018-06-05 DIAGNOSIS — I50.32 CHRONIC DIASTOLIC CONGESTIVE HEART FAILURE (HCC): ICD-10-CM

## 2018-06-05 DIAGNOSIS — R60.9 EDEMA, UNSPECIFIED TYPE: ICD-10-CM

## 2018-06-05 DIAGNOSIS — N18.4 CKD (CHRONIC KIDNEY DISEASE), STAGE IV (HCC): Primary | ICD-10-CM

## 2018-06-05 DIAGNOSIS — N28.1 RENAL CYST: ICD-10-CM

## 2018-06-05 DIAGNOSIS — I10 ESSENTIAL HYPERTENSION: ICD-10-CM

## 2018-06-05 PROCEDURE — 99214 OFFICE O/P EST MOD 30 MIN: CPT | Performed by: INTERNAL MEDICINE

## 2018-06-05 RX ORDER — METHOCARBAMOL 750 MG/1
750 TABLET, FILM COATED ORAL
COMMUNITY
End: 2020-05-12 | Stop reason: ALTCHOICE

## 2018-06-05 RX ORDER — FERROUS SULFATE 325(65) MG
325 TABLET ORAL
COMMUNITY
End: 2019-02-06 | Stop reason: SDUPTHER

## 2018-06-05 RX ORDER — ASCORBIC ACID 500 MG
500 TABLET ORAL 2 TIMES DAILY
COMMUNITY

## 2018-06-05 NOTE — LETTER
June 5, 2018     Deloris Olguin, [address] [address]    Patient: Kim Mayberry   YOB: 1932   Date of Visit: 6/5/2018       Dear Dr Kyara Sharma:    Thank you for referring Janette Christina to me for evaluation  Below are my notes for this consultation  If you have questions, please do not hesitate to call me  I look forward to following your patient along with you  Sincerely,        Christopher Cornelius DO        CC: No Recipients  Christopher Cornelius DO  6/5/2018 10:02 AM  Sign at close encounter  [address] [place] 80 y o  female MRN: [id number]  DATE: 6/5/2018  Reason for visit:   Chief Complaint   Patient presents with    Follow-up        Patient Instructions   1  CKD IV likely cardiorenal +/- from hypertensive nephrosclerosis +/- physiology of aging: baseline creatinine 1 6-1 9  sCr 1 62 as of 4/2/18    -F/u repeat BMP  -She is on ARB and torsemide as listed below  Off HCTZ  -SPEP/UPEP negative for myeloma  -avoid nephrotoxins/NSAIDs(ibuprofen, aleve, motrin, advil)/fleet enemas  -would recommend avoidance of diclofenac but if this is the only medication that gives relief to hand pain, may continue sparingly   -will monitor proteinuria - repeat UpCr   -renal u/s shows normal echogenicity, 9 3-9 5cm kidneys with complex right renal cyst - needs follow up renal ultrasound  -will check urinalysis with microscopy     2  Hypertension: BP well controlled on torsemide 20mg every day, losartan 25mg daily, metoprolol 75mg daily       3  Diastolic CHF(grade 3) and moderate AS(per echo performed 2/2017): on torsemide as above, monitor daily weights  Please send record of daily weights with patient to next visit  Recommend ongoing follow up with cardiology      4   Complex renal cyst: will follow up ultrasound 6 months(April 2018) to assess stablity of complex renal cyst  May need urology follow up if worse on repeat imaging      5  Anemia: Hgb 11 7 as of 2/21/18, continue to monitor CBC, on oral iron     6  Mineral Bone Disease of CKD:  phos/PTH/vitamin D level normal range  On oral vit D3 supplementation        Joel Gambino was seen today for follow-up  Diagnoses and all orders for this visit:    CKD (chronic kidney disease), stage IV ([facility] [address] )  -     Urinalysis with microscopic; Future  -     Protein / creatinine ratio, urine; Future  -     Basic metabolic panel; Future  -     CBC and differential; Future  -     US retroperitoneal complete; Future    Essential hypertension  -     Urinalysis with microscopic; Future  -     Protein / creatinine ratio, urine; Future    Chronic diastolic congestive heart failure (HCC)    Renal cyst  -     US retroperitoneal complete; Future    Edema, unspecified type    Vitamin D deficiency        Assessment/Plan:  1  CKD IV likely cardiorenal +/- from hypertensive nephrosclerosis +/- physiology of aging: baseline creatinine 1 6-1 9  sCr 1 62 as of 4/2/18    -F/u repeat BMP  -She is on ARB and torsemide as listed below  Off HCTZ  -SPEP/UPEP negative for myeloma  -avoid nephrotoxins/NSAIDs(ibuprofen, aleve, motrin, advil)/fleet enemas  -would recommend avoidance of diclofenac but if this is the only medication that gives relief to hand pain, may continue sparingly   -will monitor proteinuria - repeat UpCr   -renal u/s shows normal echogenicity, 9 3-9 5cm kidneys with complex right renal cyst - needs follow up renal ultrasound  -will check urinalysis with microscopy     2  Hypertension: BP well controlled on torsemide 20mg every day, losartan 25mg daily, metoprolol 75mg daily       3  Diastolic CHF(grade 3) and moderate AS(per echo performed 2/2017): on torsemide as above, monitor daily weights  Please send record of daily weights with patient to next visit  Recommend ongoing follow up with cardiology      4   Complex renal cyst: will follow up ultrasound 6 months(April 2018) to assess stablity of complex renal cyst  May need urology follow up if worse on repeat imaging      5  Anemia: Hgb 11 7 as of 2/21/18, continue to monitor CBC, on oral iron     6  Mineral Bone Disease of CKD:  phos/PTH/vitamin D level normal range  On oral vit D3 supplementation      SUBJECTIVE / INTERVAL HISTORY:  80 y o  female presents in follow up of CKD  Sandeep Henriquez denies any recent illness/hospitalizations/medication changes since last office visit  She is now on a muscle relaxer for the back  Denies NSAID use  Denies chest pain, shortness of breath, dizziness or significant changes in LE edema  She feels well and says she is "disgustingly healthy"  Review of Systems   Constitutional: Negative for chills and fever  HENT: Negative for sore throat and trouble swallowing  Eyes: Negative for visual disturbance  Respiratory: Negative for cough and shortness of breath  Cardiovascular: Negative for chest pain and leg swelling  Gastrointestinal: Negative for diarrhea, nausea and vomiting  Endocrine: Negative for polyuria  Genitourinary: Negative for difficulty urinating, dysuria and hematuria  Musculoskeletal: Negative for back pain and neck pain  Skin: Negative for rash  Neurological: Negative for dizziness, light-headedness and numbness  Hematological: Negative for adenopathy  Does not bruise/bleed easily  Psychiatric/Behavioral: The patient is not nervous/anxious  OBJECTIVE:  /62 (BP Location: Left arm, Patient Position: Sitting, Cuff Size: Standard)   Pulse 76  There is no height or weight on file to calculate BMI  Physical exam:  Physical Exam   Constitutional: She appears well-developed and well-nourished  No distress  obese   HENT:   Head: Normocephalic and atraumatic  Mouth/Throat: No oropharyngeal exudate  Eyes: Right eye exhibits no discharge  Left eye exhibits no discharge  No scleral icterus  Neck: Normal range of motion  Neck supple   No thyromegaly present  Cardiovascular: Normal rate and regular rhythm  Murmur heard  Pulmonary/Chest: Effort normal and breath sounds normal  No respiratory distress  She has no wheezes  Abdominal: Soft  Bowel sounds are normal  She exhibits no distension  Musculoskeletal: She exhibits edema (wrapped legs b/l)  Neurological: She is alert  She exhibits normal muscle tone  awake   Skin: Skin is warm and dry  No rash noted  She is not diaphoretic  Psychiatric: She has a normal mood and affect  Her behavior is normal    Nursing note and vitals reviewed  Medications:    Current Outpatient Prescriptions:     acetaminophen (TYLENOL) 325 mg tablet, Take 2 tablets by mouth every 6 (six) hours as needed for fever (Patient taking differently: Take 1,000 mg by mouth daily  ), Disp: 30 tablet, Rfl: 0    busPIRone (BUSPAR) 15 mg tablet, Take 15 mg by mouth 3 (three) times a day, Disp: , Rfl:     Cholecalciferol (VITAMIN D3) 2000 UNITS TABS, Take 1 tablet by mouth daily  , Disp: , Rfl:     docusate sodium (COLACE) 100 mg capsule, Take 1 capsule by mouth 2 (two) times a day, Disp: 10 capsule, Rfl: 0    levothyroxine 50 mcg tablet, Take 50 mcg by mouth daily  , Disp: , Rfl:     loratadine (CLARITIN) 10 mg tablet, Take 10 mg by mouth daily, Disp: , Rfl:     losartan (COZAAR) 25 mg tablet, Take 1 tablet by mouth daily, Disp: , Rfl: 0    metoprolol succinate (TOPROL-XL) 50 mg 24 hr tablet, Take 75 mg by mouth daily  Hold for sbp< 100 or hr <55, Disp: , Rfl:     omeprazole (PriLOSEC) 20 mg delayed release capsule, Take 20 mg by mouth daily  , Disp: , Rfl:     OxyCODONE HCl 5 MG TABA, Take 2 5 mg by mouth every 6 (six) hours as needed, Disp: , Rfl:     QUEtiapine (SEROquel) 25 mg tablet, Take 3 tablets by mouth daily at bedtime (Patient taking differently: Take 50 mg by mouth daily at bedtime  ), Disp: , Rfl: 0    senna (SENOKOT) 8 6 mg, Take 2 tablets by mouth daily at bedtime (Patient taking differently: Take 1 tablet by mouth daily at bedtime  ), Disp: 120 each, Rfl: 0    sertraline (ZOLOFT) 100 mg tablet, Take 100 mg by mouth daily, Disp: , Rfl:     torsemide (DEMADEX) 20 mg tablet, Take 1 tablet by mouth daily, Disp: , Rfl: 0    Allergies: Allergies as of 06/05/2018 - Reviewed 05/23/2018   Allergen Reaction Noted    Hydrocodone  06/27/2017    Penicillins  02/14/2016    Sulfa antibiotics  02/14/2016    Vicodin [hydrocodone-acetaminophen]  02/14/2016       The following portions of the patient's history were reviewed and updated as appropriate: past family history, past surgical history and problem list     Laboratory Results:  Lab Results   Component Value Date    GLUCOSE 174 (H) 02/09/2018    CALCIUM 8 9 02/09/2018     02/09/2018    K 3 7 02/09/2018    CO2 26 02/09/2018     02/09/2018    BUN 37 (H) 02/09/2018    CREATININE 1 95 (H) 02/09/2018        Lab Results   Component Value Date     2 (H) 06/30/2017    CALCIUM 8 9 02/09/2018    PHOS 3 4 08/16/2017       Portions of the record may have been created with voice recognition software   Occasional wrong word or "sound a like" substitutions may have occurred due to the inherent limitations of voice recognition software   Read the chart carefully and recognize, using context, where substitutions have occurred

## 2018-06-05 NOTE — PROGRESS NOTES
NEPHROLOGY OUTPATIENT PROGRESS NOTE   Mariusz Denton 80 y o  female MRN: [id number]  DATE: 6/5/2018  Reason for visit:   Chief Complaint   Patient presents with    Follow-up        Patient Instructions   1  CKD IV likely cardiorenal +/- from hypertensive nephrosclerosis +/- physiology of aging: baseline creatinine 1 6-1 9  sCr 1 62 as of 4/2/18    -F/u repeat BMP  -She is on ARB and torsemide as listed below  Off HCTZ  -SPEP/UPEP negative for myeloma  -avoid nephrotoxins/NSAIDs(ibuprofen, aleve, motrin, advil)/fleet enemas  -would recommend avoidance of diclofenac but if this is the only medication that gives relief to hand pain, may continue sparingly   -will monitor proteinuria - repeat UpCr   -renal u/s shows normal echogenicity, 9 3-9 5cm kidneys with complex right renal cyst - needs follow up renal ultrasound  -will check urinalysis with microscopy     2  Hypertension: BP well controlled on torsemide 20mg every day, losartan 25mg daily, metoprolol 75mg daily       3  Diastolic CHF(grade 3) and moderate AS(per echo performed 2/2017): on torsemide as above, monitor daily weights  Please send record of daily weights with patient to next visit  Recommend ongoing follow up with cardiology      4  Complex renal cyst: will follow up ultrasound 6 months(April 2018) to assess stablity of complex renal cyst  May need urology follow up if worse on repeat imaging      5  Anemia: Hgb 11 7 as of 2/21/18, continue to monitor CBC, on oral iron     6  Mineral Bone Disease of CKD:  phos/PTH/vitamin D level normal range  On oral vit D3 supplementation        Ciarra Orona was seen today for follow-up  Diagnoses and all orders for this visit:    CKD (chronic kidney disease), stage IV ([facility] [address] )  -     Urinalysis with microscopic; Future  -     Protein / creatinine ratio, urine; Future  -     Basic metabolic panel; Future  -     CBC and differential; Future  -     US retroperitoneal complete;  Future    Essential hypertension  - Urinalysis with microscopic; Future  -     Protein / creatinine ratio, urine; Future    Chronic diastolic congestive heart failure (HCC)    Renal cyst  -     US retroperitoneal complete; Future    Edema, unspecified type    Vitamin D deficiency        Assessment/Plan:  1  CKD IV likely cardiorenal +/- from hypertensive nephrosclerosis +/- physiology of aging: baseline creatinine 1 6-1 9  sCr 1 62 as of 4/2/18    -F/u repeat BMP  -She is on ARB and torsemide as listed below  Off HCTZ  -SPEP/UPEP negative for myeloma  -avoid nephrotoxins/NSAIDs(ibuprofen, aleve, motrin, advil)/fleet enemas  -would recommend avoidance of diclofenac but if this is the only medication that gives relief to hand pain, may continue sparingly   -will monitor proteinuria - repeat UpCr   -renal u/s shows normal echogenicity, 9 3-9 5cm kidneys with complex right renal cyst - needs follow up renal ultrasound  -will check urinalysis with microscopy     2  Hypertension: BP well controlled on torsemide 20mg every day, losartan 25mg daily, metoprolol 75mg daily       3  Diastolic CHF(grade 3) and moderate AS(per echo performed 2/2017): on torsemide as above, monitor daily weights  Please send record of daily weights with patient to next visit  Recommend ongoing follow up with cardiology      4  Complex renal cyst: will follow up ultrasound 6 months(April 2018) to assess stablity of complex renal cyst  May need urology follow up if worse on repeat imaging      5  Anemia: Hgb 11 7 as of 2/21/18, continue to monitor CBC, on oral iron     6  Mineral Bone Disease of CKD:  phos/PTH/vitamin D level normal range  On oral vit D3 supplementation      SUBJECTIVE / INTERVAL HISTORY:  80 y o  female presents in follow up of CKD  Sonya Valentine denies any recent illness/hospitalizations/medication changes since last office visit  She is now on a muscle relaxer for the back  Denies NSAID use      Denies chest pain, shortness of breath, dizziness or significant changes in LE edema  She feels well and says she is "disgustingly healthy"  Review of Systems   Constitutional: Negative for chills and fever  HENT: Negative for sore throat and trouble swallowing  Eyes: Negative for visual disturbance  Respiratory: Negative for cough and shortness of breath  Cardiovascular: Negative for chest pain and leg swelling  Gastrointestinal: Negative for diarrhea, nausea and vomiting  Endocrine: Negative for polyuria  Genitourinary: Negative for difficulty urinating, dysuria and hematuria  Musculoskeletal: Negative for back pain and neck pain  Skin: Negative for rash  Neurological: Negative for dizziness, light-headedness and numbness  Hematological: Negative for adenopathy  Does not bruise/bleed easily  Psychiatric/Behavioral: The patient is not nervous/anxious  OBJECTIVE:  /62 (BP Location: Left arm, Patient Position: Sitting, Cuff Size: Standard)   Pulse 76  There is no height or weight on file to calculate BMI  Physical exam:  Physical Exam   Constitutional: She appears well-developed and well-nourished  No distress  obese   HENT:   Head: Normocephalic and atraumatic  Mouth/Throat: No oropharyngeal exudate  Eyes: Right eye exhibits no discharge  Left eye exhibits no discharge  No scleral icterus  Neck: Normal range of motion  Neck supple  No thyromegaly present  Cardiovascular: Normal rate and regular rhythm  Murmur heard  Pulmonary/Chest: Effort normal and breath sounds normal  No respiratory distress  She has no wheezes  Abdominal: Soft  Bowel sounds are normal  She exhibits no distension  Musculoskeletal: She exhibits edema (wrapped legs b/l)  Neurological: She is alert  She exhibits normal muscle tone  awake   Skin: Skin is warm and dry  No rash noted  She is not diaphoretic  Psychiatric: She has a normal mood and affect   Her behavior is normal    Nursing note and vitals reviewed  Medications:    Current Outpatient Prescriptions:     acetaminophen (TYLENOL) 325 mg tablet, Take 2 tablets by mouth every 6 (six) hours as needed for fever (Patient taking differently: Take 1,000 mg by mouth daily  ), Disp: 30 tablet, Rfl: 0    busPIRone (BUSPAR) 15 mg tablet, Take 15 mg by mouth 3 (three) times a day, Disp: , Rfl:     Cholecalciferol (VITAMIN D3) 2000 UNITS TABS, Take 1 tablet by mouth daily  , Disp: , Rfl:     docusate sodium (COLACE) 100 mg capsule, Take 1 capsule by mouth 2 (two) times a day, Disp: 10 capsule, Rfl: 0    levothyroxine 50 mcg tablet, Take 50 mcg by mouth daily  , Disp: , Rfl:     loratadine (CLARITIN) 10 mg tablet, Take 10 mg by mouth daily, Disp: , Rfl:     losartan (COZAAR) 25 mg tablet, Take 1 tablet by mouth daily, Disp: , Rfl: 0    metoprolol succinate (TOPROL-XL) 50 mg 24 hr tablet, Take 75 mg by mouth daily  Hold for sbp< 100 or hr <55, Disp: , Rfl:     omeprazole (PriLOSEC) 20 mg delayed release capsule, Take 20 mg by mouth daily  , Disp: , Rfl:     OxyCODONE HCl 5 MG TABA, Take 2 5 mg by mouth every 6 (six) hours as needed, Disp: , Rfl:     QUEtiapine (SEROquel) 25 mg tablet, Take 3 tablets by mouth daily at bedtime (Patient taking differently: Take 50 mg by mouth daily at bedtime  ), Disp: , Rfl: 0    senna (SENOKOT) 8 6 mg, Take 2 tablets by mouth daily at bedtime (Patient taking differently: Take 1 tablet by mouth daily at bedtime  ), Disp: 120 each, Rfl: 0    sertraline (ZOLOFT) 100 mg tablet, Take 100 mg by mouth daily, Disp: , Rfl:     torsemide (DEMADEX) 20 mg tablet, Take 1 tablet by mouth daily, Disp: , Rfl: 0    Allergies:   Allergies as of 06/05/2018 - Reviewed 05/23/2018   Allergen Reaction Noted    Hydrocodone  06/27/2017    Penicillins  02/14/2016    Sulfa antibiotics  02/14/2016    Vicodin [hydrocodone-acetaminophen]  02/14/2016       The following portions of the patient's history were reviewed and updated as appropriate: past family history, past surgical history and problem list     Laboratory Results:  Lab Results   Component Value Date    GLUCOSE 174 (H) 02/09/2018    CALCIUM 8 9 02/09/2018     02/09/2018    K 3 7 02/09/2018    CO2 26 02/09/2018     02/09/2018    BUN 37 (H) 02/09/2018    CREATININE 1 95 (H) 02/09/2018        Lab Results   Component Value Date     2 (H) 06/30/2017    CALCIUM 8 9 02/09/2018    PHOS 3 4 08/16/2017       Portions of the record may have been created with voice recognition software   Occasional wrong word or "sound a like" substitutions may have occurred due to the inherent limitations of voice recognition software   Read the chart carefully and recognize, using context, where substitutions have occurred

## 2018-06-05 NOTE — PATIENT INSTRUCTIONS
1  CKD IV likely cardiorenal +/- from hypertensive nephrosclerosis +/- physiology of aging: baseline creatinine 1 6-1 9  sCr 1 62 as of 4/2/18    -F/u repeat BMP  -She is on ARB and torsemide as listed below  Off HCTZ  -SPEP/UPEP negative for myeloma  -avoid nephrotoxins/NSAIDs(ibuprofen, aleve, motrin, advil)/fleet enemas  -would recommend avoidance of diclofenac but if this is the only medication that gives relief to hand pain, may continue sparingly   -will monitor proteinuria - repeat UpCr   -renal u/s shows normal echogenicity, 9 3-9 5cm kidneys with complex right renal cyst - needs follow up renal ultrasound  -will check urinalysis with microscopy     2  Hypertension: BP well controlled on torsemide 20mg every day, losartan 25mg daily, metoprolol 75mg daily       3  Diastolic CHF(grade 3) and moderate AS(per echo performed 2/2017): on torsemide as above, monitor daily weights  Please send record of daily weights with patient to next visit  Recommend ongoing follow up with cardiology      4  Complex renal cyst: will follow up ultrasound 6 months(April 2018) to assess stablity of complex renal cyst  May need urology follow up if worse on repeat imaging      5  Anemia: Hgb 11 7 as of 2/21/18, continue to monitor CBC, on oral iron     6  Mineral Bone Disease of CKD:  phos/PTH/vitamin D level normal range   On oral vit D3 supplementation

## 2018-09-11 ENCOUNTER — TELEPHONE (OUTPATIENT)
Dept: PAIN MEDICINE | Facility: CLINIC | Age: 83
End: 2018-09-11

## 2018-09-11 NOTE — TELEPHONE ENCOUNTER
Deidre from [facility] left a message in voicemail at 10:19 this morning  She is asking for a copy of the office notes from her visit on 5/23 to be faxed to [phone]  Deidre can be reached at [phone] with any questions

## 2018-09-12 ENCOUNTER — TELEPHONE (OUTPATIENT)
Dept: PAIN MEDICINE | Facility: CLINIC | Age: 83
End: 2018-09-12

## 2018-09-12 NOTE — TELEPHONE ENCOUNTER
Patient left voice message today 9/12/18 at 903 am would like to discuss records for pt  3rd time leaving messages ov 5/23/18 , please return call to [facility]

## 2018-10-02 ENCOUNTER — OFFICE VISIT (OUTPATIENT)
Dept: NEPHROLOGY | Facility: CLINIC | Age: 83
End: 2018-10-02
Payer: MEDICARE

## 2018-10-02 VITALS
BODY MASS INDEX: 37.43 KG/M2 | HEIGHT: 63 IN | HEART RATE: 70 BPM | DIASTOLIC BLOOD PRESSURE: 70 MMHG | WEIGHT: 211.25 LBS | SYSTOLIC BLOOD PRESSURE: 118 MMHG | RESPIRATION RATE: 16 BRPM

## 2018-10-02 DIAGNOSIS — I10 ESSENTIAL HYPERTENSION: ICD-10-CM

## 2018-10-02 DIAGNOSIS — D63.8 ANEMIA OF CHRONIC DISEASE: ICD-10-CM

## 2018-10-02 DIAGNOSIS — N28.1 RENAL CYST: ICD-10-CM

## 2018-10-02 DIAGNOSIS — N18.4 CKD (CHRONIC KIDNEY DISEASE), STAGE IV (HCC): ICD-10-CM

## 2018-10-02 DIAGNOSIS — I50.32 CHRONIC DIASTOLIC CONGESTIVE HEART FAILURE (HCC): ICD-10-CM

## 2018-10-02 PROCEDURE — 99213 OFFICE O/P EST LOW 20 MIN: CPT | Performed by: INTERNAL MEDICINE

## 2018-10-02 RX ORDER — LIDOCAINE 50 MG/G
1 PATCH TOPICAL DAILY
COMMUNITY
End: 2020-05-12 | Stop reason: ALTCHOICE

## 2018-10-02 NOTE — PROGRESS NOTES
NEPHROLOGY OUTPATIENT PROGRESS NOTE   Howard Wyman 80 y o  female MRN: [id number]  DATE: 10/2/2018  Reason for visit:   Chief Complaint   Patient presents with    Follow-up        Patient Instructions   1  CKD IV likely cardiorenal +/- from hypertensive nephrosclerosis +/- physiology of aging: baseline creatinine 1 6-1 9  sCr 1 62 as of 7/2/18 and stable  -F/u repeat BMP, mag, phos, PTH  -She is on ARB and torsemide as listed below  Off HCTZ  -SPEP/UPEP negative for myeloma  -avoid nephrotoxins/NSAIDs(ibuprofen, aleve, motrin, advil)/fleet enemas  -would recommend avoidance of diclofenac but if this is the only medication that gives relief to hand pain, may continue sparingly   -will monitor proteinuria - repeat UpCr   -renal u/s shows normal echogenicity, 9 3-9 5cm kidneys with complex right renal cyst - needs follow up renal ultrasound  -will check urinalysis with microscopy     2  Hypertension: BP well controlled on torsemide 20mg every day, losartan 25mg daily, metoprolol 75mg daily       3  Diastolic CHF(grade 3) and moderate AS(per echo performed 2/2017): on torsemide as above, monitor daily weights  Please send record of daily weights with patient to next visit  Recommend ongoing follow up with cardiology      4  Complex renal cyst: June 2018 renal u/s shows 2cm benign renal cyst     5  Anemia: Hgb 11 7 as of 2/21/18, continue to monitor CBC, on oral iron     6  Mineral Bone Disease of CKD:  phos/PTH/vitamin D level normal range  On oral vit D3 supplementation        [place] was seen today for follow-up  Diagnoses and all orders for this visit:    CKD (chronic kidney disease), stage IV (HCC)  -     Magnesium; Future  -     Basic metabolic panel; Future  -     Phosphorus; Future  -     PTH, intact; Future  -     Protein / creatinine ratio, urine; Future  -     Urinalysis with microscopic;  Future    Essential hypertension    Chronic diastolic congestive heart failure (HCC)    Renal cyst    Anemia of chronic disease  -     CBC and differential; Future        Assessment/Plan:  1  CKD IV likely cardiorenal +/- from hypertensive nephrosclerosis +/- physiology of aging: baseline creatinine 1 6-1 9  sCr 1 62 as of 7/2/18 and stable  -F/u repeat BMP, mag, phos, PTH  -She is on ARB and torsemide as listed below  Off HCTZ  -SPEP/UPEP negative for myeloma  -avoid nephrotoxins/NSAIDs(ibuprofen, aleve, motrin, advil)/fleet enemas  -would recommend avoidance of diclofenac but if this is the only medication that gives relief to hand pain, may continue sparingly   -will monitor proteinuria - repeat UpCr   -renal u/s shows normal echogenicity, 9 3-9 5cm kidneys with complex right renal cyst - needs follow up renal ultrasound  -will check urinalysis with microscopy     2  Hypertension: BP well controlled on torsemide 20mg every day, losartan 25mg daily, metoprolol 75mg daily       3  Diastolic CHF(grade 3) and moderate AS(per echo performed 2/2017): on torsemide as above, monitor daily weights  Please send record of daily weights with patient to next visit  Recommend ongoing follow up with cardiology      4  Complex renal cyst: June 2018 renal u/s shows 2cm benign renal cyst     5  Anemia: Hgb 11 7 as of 2/21/18, continue to monitor CBC, on oral iron     6  Mineral Bone Disease of CKD:  phos/PTH/vitamin D level normal range  On oral vit D3 supplementation      SUBJECTIVE / INTERVAL HISTORY:  80 y o  female presents in follow up of CKD  Erinnicholas Timmy denies any recent illness/hospitalizations/medication changes since last office visit  Denies NSAID use  She skips lunch  She eats breakfast and dinner  Review of Systems   Constitutional: Negative for chills and fever  HENT: Negative for sore throat and trouble swallowing  Eyes: Negative for visual disturbance  Respiratory: Negative for cough and shortness of breath  Cardiovascular: Negative for chest pain and leg swelling     Gastrointestinal: Negative for diarrhea, nausea and vomiting  Endocrine: Negative for polyuria  Genitourinary: Negative for difficulty urinating, dysuria and hematuria  Musculoskeletal: Positive for back pain  Negative for neck pain  Skin: Negative for rash  Neurological: Negative for dizziness, light-headedness and numbness  Hematological: Negative for adenopathy  Does not bruise/bleed easily  Psychiatric/Behavioral: The patient is not nervous/anxious  OBJECTIVE:  /70 (BP Location: Left arm, Patient Position: Sitting, Cuff Size: Standard)   Pulse 70   Resp 16   Ht 5' 3" (1 6 m)   Wt 95 8 kg (211 lb 4 oz)   BMI 37 42 kg/m²  Body mass index is 37 42 kg/m²  Physical exam:  Physical Exam   Constitutional: She appears well-developed and well-nourished  No distress  Obese, in wheelchair   HENT:   Head: Normocephalic and atraumatic  Mouth/Throat: No oropharyngeal exudate  Eyes: Right eye exhibits no discharge  Left eye exhibits no discharge  No scleral icterus  Neck: Normal range of motion  Neck supple  No thyromegaly present  Cardiovascular: Normal rate and regular rhythm  Murmur heard  Pulmonary/Chest: Effort normal and breath sounds normal  No respiratory distress  She has no wheezes  Abdominal: Soft  Bowel sounds are normal  She exhibits no distension  Musculoskeletal: She exhibits edema (+compression stockings b/l)  Neurological: She is alert  She exhibits normal muscle tone  awake   Skin: Skin is warm and dry  No rash noted  She is not diaphoretic  Psychiatric: She has a normal mood and affect  Her behavior is normal    Nursing note and vitals reviewed        Medications:    Current Outpatient Prescriptions:     acetaminophen (TYLENOL) 325 mg tablet, Take 2 tablets by mouth every 6 (six) hours as needed for fever (Patient taking differently: Take 1,000 mg by mouth daily  ), Disp: 30 tablet, Rfl: 0    ascorbic acid (VITAMIN C) 500 MG tablet, Take 500 mg by mouth daily, Disp: , Rfl:   brimonidine (ALPHAGAN P) 0 1 %, Administer 1 drop to both eyes 2 (two) times a day, Disp: , Rfl:     busPIRone (BUSPAR) 15 mg tablet, Take 15 mg by mouth 3 (three) times a day, Disp: , Rfl:     Cholecalciferol (VITAMIN D3) 2000 UNITS TABS, Take 1 tablet by mouth daily  , Disp: , Rfl:     docusate sodium (COLACE) 100 mg capsule, Take 1 capsule by mouth 2 (two) times a day, Disp: 10 capsule, Rfl: 0    ferrous sulfate 325 (65 Fe) mg tablet, Take 325 mg by mouth daily with breakfast, Disp: , Rfl:     levothyroxine 50 mcg tablet, Take 50 mcg by mouth daily  , Disp: , Rfl:     lidocaine (LIDODERM) 5 %, Apply 1 patch topically daily Remove & Discard patch within 12 hours or as directed by MD, Disp: , Rfl:     loratadine (CLARITIN) 10 mg tablet, Take 10 mg by mouth daily, Disp: , Rfl:     losartan (COZAAR) 25 mg tablet, Take 1 tablet by mouth daily, Disp: , Rfl: 0    methocarbamol (ROBAXIN) 500 mg tablet, Take 500 mg by mouth 4 (four) times a day, Disp: , Rfl:     metoprolol succinate (TOPROL-XL) 50 mg 24 hr tablet, Take 75 mg by mouth daily  Hold for sbp< 100 or hr <55, Disp: , Rfl:     omeprazole (PriLOSEC) 20 mg delayed release capsule, Take 20 mg by mouth daily  , Disp: , Rfl:     OxyCODONE HCl 5 MG TABA, Take 2 5 mg by mouth every 6 (six) hours as needed, Disp: , Rfl:     senna (SENOKOT) 8 6 mg, Take 2 tablets by mouth daily at bedtime (Patient taking differently: Take 1 tablet by mouth daily at bedtime  ), Disp: 120 each, Rfl: 0    sertraline (ZOLOFT) 100 mg tablet, Take 150 mg by mouth daily  , Disp: , Rfl:     tafluprost (ZIOPTAN) 0 0015 % ophthalmic solution, Administer 1 drop to both eyes daily, Disp: , Rfl:     torsemide (DEMADEX) 20 mg tablet, Take 1 tablet by mouth daily, Disp: , Rfl: 0    QUEtiapine (SEROquel) 25 mg tablet, Take 3 tablets by mouth daily at bedtime (Patient taking differently: Take 50 mg by mouth daily at bedtime  ), Disp: , Rfl: 0    Allergies:   Allergies as of 10/02/2018 - Reviewed 10/02/2018   Allergen Reaction Noted    Hydrocodone  06/27/2017    Penicillins  02/14/2016    Sulfa antibiotics  02/14/2016    Vicodin [hydrocodone-acetaminophen]  02/14/2016       The following portions of the patient's history were reviewed and updated as appropriate: past family history, past surgical history and problem list     Laboratory Results:  Lab Results   Component Value Date    GLUCOSE 100 02/09/2017    CALCIUM 8 9 02/09/2018     02/09/2018    K 3 7 02/09/2018    CO2 26 02/09/2018     02/09/2018    BUN 37 (H) 02/09/2018    CREATININE 1 95 (H) 02/09/2018        Lab Results   Component Value Date     2 (H) 06/30/2017    CALCIUM 8 9 02/09/2018    PHOS 3 4 08/16/2017       Portions of the record may have been created with voice recognition software   Occasional wrong word or "sound a like" substitutions may have occurred due to the inherent limitations of voice recognition software   Read the chart carefully and recognize, using context, where substitutions have occurred

## 2018-10-02 NOTE — LETTER
October 2, 2018     Alejandro Putnammaria isabel, [address]    Patient: Emily Abebe   YOB: 1932   Date of Visit: 10/2/2018       Dear Dr Thien Padilla:    Thank you for referring Dorla Sandifer to me for evaluation  Below are my notes for this consultation  If you have questions, please do not hesitate to call me  I look forward to following your patient along with you  Sincerely,        Zeynep Larger, DO        CC: No Recipients  Zeynep Navarrete, DO  10/2/2018  2:45 PM  Sign at close encounter  [address] [place] 80 y o  female MRN: [id number]  DATE: 10/2/2018  Reason for visit:   Chief Complaint   Patient presents with    Follow-up        Patient Instructions   1  CKD IV likely cardiorenal +/- from hypertensive nephrosclerosis +/- physiology of aging: baseline creatinine 1 6-1 9  sCr 1 62 as of 7/2/18 and stable  -F/u repeat BMP, mag, phos, PTH  -She is on ARB and torsemide as listed below  Off HCTZ  -SPEP/UPEP negative for myeloma  -avoid nephrotoxins/NSAIDs(ibuprofen, aleve, motrin, advil)/fleet enemas  -would recommend avoidance of diclofenac but if this is the only medication that gives relief to hand pain, may continue sparingly   -will monitor proteinuria - repeat UpCr   -renal u/s shows normal echogenicity, 9 3-9 5cm kidneys with complex right renal cyst - needs follow up renal ultrasound  -will check urinalysis with microscopy     2  Hypertension: BP well controlled on torsemide 20mg every day, losartan 25mg daily, metoprolol 75mg daily       3  Diastolic CHF(grade 3) and moderate AS(per echo performed 2/2017): on torsemide as above, monitor daily weights  Please send record of daily weights with patient to next visit  Recommend ongoing follow up with cardiology      4  Complex renal cyst: June 2018 renal u/s shows 2cm benign renal cyst     5   Anemia: Hgb 11 7 as of 2/21/18, continue to monitor CBC, on oral iron     6  Mineral Bone Disease of CKD:  phos/PTH/vitamin D level normal range  On oral vit D3 supplementation        Jeferson French was seen today for follow-up  Diagnoses and all orders for this visit:    CKD (chronic kidney disease), stage IV (HCC)  -     Magnesium; Future  -     Basic metabolic panel; Future  -     Phosphorus; Future  -     PTH, intact; Future  -     Protein / creatinine ratio, urine; Future  -     Urinalysis with microscopic; Future    Essential hypertension    Chronic diastolic congestive heart failure (HCC)    Renal cyst    Anemia of chronic disease  -     CBC and differential; Future        Assessment/Plan:  1  CKD IV likely cardiorenal +/- from hypertensive nephrosclerosis +/- physiology of aging: baseline creatinine 1 6-1 9  sCr 1 62 as of 7/2/18 and stable  -F/u repeat BMP, mag, phos, PTH  -She is on ARB and torsemide as listed below  Off HCTZ  -SPEP/UPEP negative for myeloma  -avoid nephrotoxins/NSAIDs(ibuprofen, aleve, motrin, advil)/fleet enemas  -would recommend avoidance of diclofenac but if this is the only medication that gives relief to hand pain, may continue sparingly   -will monitor proteinuria - repeat UpCr   -renal u/s shows normal echogenicity, 9 3-9 5cm kidneys with complex right renal cyst - needs follow up renal ultrasound  -will check urinalysis with microscopy     2  Hypertension: BP well controlled on torsemide 20mg every day, losartan 25mg daily, metoprolol 75mg daily       3  Diastolic CHF(grade 3) and moderate AS(per echo performed 2/2017): on torsemide as above, monitor daily weights  Please send record of daily weights with patient to next visit  Recommend ongoing follow up with cardiology      4  Complex renal cyst: June 2018 renal u/s shows 2cm benign renal cyst     5  Anemia: Hgb 11 7 as of 2/21/18, continue to monitor CBC, on oral iron     6  Mineral Bone Disease of CKD:  phos/PTH/vitamin D level normal range   On oral vit D3 supplementation      SUBJECTIVE / INTERVAL HISTORY:  80 y o  female presents in follow up of CKD  Steve King denies any recent illness/hospitalizations/medication changes since last office visit  Denies NSAID use  She skips lunch  She eats breakfast and dinner  Review of Systems   Constitutional: Negative for chills and fever  HENT: Negative for sore throat and trouble swallowing  Eyes: Negative for visual disturbance  Respiratory: Negative for cough and shortness of breath  Cardiovascular: Negative for chest pain and leg swelling  Gastrointestinal: Negative for diarrhea, nausea and vomiting  Endocrine: Negative for polyuria  Genitourinary: Negative for difficulty urinating, dysuria and hematuria  Musculoskeletal: Positive for back pain  Negative for neck pain  Skin: Negative for rash  Neurological: Negative for dizziness, light-headedness and numbness  Hematological: Negative for adenopathy  Does not bruise/bleed easily  Psychiatric/Behavioral: The patient is not nervous/anxious  OBJECTIVE:  /70 (BP Location: Left arm, Patient Position: Sitting, Cuff Size: Standard)   Pulse 70   Resp 16   Ht 5' 3" (1 6 m)   Wt 95 8 kg (211 lb 4 oz)   BMI 37 42 kg/m²   Body mass index is 37 42 kg/m²  Physical exam:  Physical Exam   Constitutional: She appears well-developed and well-nourished  No distress  Obese, in wheelchair   HENT:   Head: Normocephalic and atraumatic  Mouth/Throat: No oropharyngeal exudate  Eyes: Right eye exhibits no discharge  Left eye exhibits no discharge  No scleral icterus  Neck: Normal range of motion  Neck supple  No thyromegaly present  Cardiovascular: Normal rate and regular rhythm  Murmur heard  Pulmonary/Chest: Effort normal and breath sounds normal  No respiratory distress  She has no wheezes  Abdominal: Soft  Bowel sounds are normal  She exhibits no distension     Musculoskeletal: She exhibits edema (+compression stockings b/l)  Neurological: She is alert  She exhibits normal muscle tone  awake   Skin: Skin is warm and dry  No rash noted  She is not diaphoretic  Psychiatric: She has a normal mood and affect  Her behavior is normal    Nursing note and vitals reviewed  Medications:    Current Outpatient Prescriptions:     acetaminophen (TYLENOL) 325 mg tablet, Take 2 tablets by mouth every 6 (six) hours as needed for fever (Patient taking differently: Take 1,000 mg by mouth daily  ), Disp: 30 tablet, Rfl: 0    ascorbic acid (VITAMIN C) 500 MG tablet, Take 500 mg by mouth daily, Disp: , Rfl:     brimonidine (ALPHAGAN P) 0 1 %, Administer 1 drop to both eyes 2 (two) times a day, Disp: , Rfl:     busPIRone (BUSPAR) 15 mg tablet, Take 15 mg by mouth 3 (three) times a day, Disp: , Rfl:     Cholecalciferol (VITAMIN D3) 2000 UNITS TABS, Take 1 tablet by mouth daily  , Disp: , Rfl:     docusate sodium (COLACE) 100 mg capsule, Take 1 capsule by mouth 2 (two) times a day, Disp: 10 capsule, Rfl: 0    ferrous sulfate 325 (65 Fe) mg tablet, Take 325 mg by mouth daily with breakfast, Disp: , Rfl:     levothyroxine 50 mcg tablet, Take 50 mcg by mouth daily  , Disp: , Rfl:     lidocaine (LIDODERM) 5 %, Apply 1 patch topically daily Remove & Discard patch within 12 hours or as directed by MD, Disp: , Rfl:     loratadine (CLARITIN) 10 mg tablet, Take 10 mg by mouth daily, Disp: , Rfl:     losartan (COZAAR) 25 mg tablet, Take 1 tablet by mouth daily, Disp: , Rfl: 0    methocarbamol (ROBAXIN) 500 mg tablet, Take 500 mg by mouth 4 (four) times a day, Disp: , Rfl:     metoprolol succinate (TOPROL-XL) 50 mg 24 hr tablet, Take 75 mg by mouth daily  Hold for sbp< 100 or hr <55, Disp: , Rfl:     omeprazole (PriLOSEC) 20 mg delayed release capsule, Take 20 mg by mouth daily  , Disp: , Rfl:     OxyCODONE HCl 5 MG TABA, Take 2 5 mg by mouth every 6 (six) hours as needed, Disp: , Rfl:     senna (SENOKOT) 8 6 mg, Take 2 tablets by mouth daily at bedtime (Patient taking differently: Take 1 tablet by mouth daily at bedtime  ), Disp: 120 each, Rfl: 0    sertraline (ZOLOFT) 100 mg tablet, Take 150 mg by mouth daily  , Disp: , Rfl:     tafluprost (ZIOPTAN) 0 0015 % ophthalmic solution, Administer 1 drop to both eyes daily, Disp: , Rfl:     torsemide (DEMADEX) 20 mg tablet, Take 1 tablet by mouth daily, Disp: , Rfl: 0    QUEtiapine (SEROquel) 25 mg tablet, Take 3 tablets by mouth daily at bedtime (Patient taking differently: Take 50 mg by mouth daily at bedtime  ), Disp: , Rfl: 0    Allergies: Allergies as of 10/02/2018 - Reviewed 10/02/2018   Allergen Reaction Noted    Hydrocodone  06/27/2017    Penicillins  02/14/2016    Sulfa antibiotics  02/14/2016    Vicodin [hydrocodone-acetaminophen]  02/14/2016       The following portions of the patient's history were reviewed and updated as appropriate: past family history, past surgical history and problem list     Laboratory Results:  Lab Results   Component Value Date    GLUCOSE 100 02/09/2017    CALCIUM 8 9 02/09/2018     02/09/2018    K 3 7 02/09/2018    CO2 26 02/09/2018     02/09/2018    BUN 37 (H) 02/09/2018    CREATININE 1 95 (H) 02/09/2018        Lab Results   Component Value Date     2 (H) 06/30/2017    CALCIUM 8 9 02/09/2018    PHOS 3 4 08/16/2017       Portions of the record may have been created with voice recognition software   Occasional wrong word or "sound a like" substitutions may have occurred due to the inherent limitations of voice recognition software   Read the chart carefully and recognize, using context, where substitutions have occurred

## 2018-10-02 NOTE — PATIENT INSTRUCTIONS
1  CKD IV likely cardiorenal +/- from hypertensive nephrosclerosis +/- physiology of aging: baseline creatinine 1 6-1 9  sCr 1 62 as of 7/2/18 and stable  -F/u repeat BMP, mag, phos, PTH  -She is on ARB and torsemide as listed below  Off HCTZ  -SPEP/UPEP negative for myeloma  -avoid nephrotoxins/NSAIDs(ibuprofen, aleve, motrin, advil)/fleet enemas  -would recommend avoidance of diclofenac but if this is the only medication that gives relief to hand pain, may continue sparingly   -will monitor proteinuria - repeat UpCr   -renal u/s shows normal echogenicity, 9 3-9 5cm kidneys with complex right renal cyst - needs follow up renal ultrasound  -will check urinalysis with microscopy     2  Hypertension: BP well controlled on torsemide 20mg every day, losartan 25mg daily, metoprolol 75mg daily       3  Diastolic CHF(grade 3) and moderate AS(per echo performed 2/2017): on torsemide as above, monitor daily weights  Please send record of daily weights with patient to next visit  Recommend ongoing follow up with cardiology      4  Complex renal cyst: June 2018 renal u/s shows 2cm benign renal cyst     5  Anemia: Hgb 11 7 as of 2/21/18, continue to monitor CBC, on oral iron     6  Mineral Bone Disease of CKD:  phos/PTH/vitamin D level normal range   On oral vit D3 supplementation

## 2018-10-11 ENCOUNTER — TELEPHONE (OUTPATIENT)
Dept: NEPHROLOGY | Facility: CLINIC | Age: 83
End: 2018-10-11

## 2018-10-11 ENCOUNTER — TELEPHONE (OUTPATIENT)
Dept: NEPHROLOGY | Facility: HOSPITAL | Age: 83
End: 2018-10-11

## 2018-10-11 DIAGNOSIS — D64.9 ANEMIA, UNSPECIFIED TYPE: Primary | ICD-10-CM

## 2018-10-11 NOTE — TELEPHONE ENCOUNTER
----- Message from Chely Rubio DO sent at 10/11/2018 11:16 AM EDT -----  Hgb has dropped from 11s to 9 3  Recommend checking iron panel, ferritin, which have already been ordered and repeat CBC  Please ask patient to obtain bloodwork later this month

## 2018-10-11 NOTE — TELEPHONE ENCOUNTER
Left message for patient asking her to call back to inform her of lab results and repeat labs to be done later this month

## 2018-10-15 ENCOUNTER — TELEPHONE (OUTPATIENT)
Dept: NEPHROLOGY | Facility: CLINIC | Age: 83
End: 2018-10-15

## 2018-10-15 NOTE — TELEPHONE ENCOUNTER
I spoke to the patient, she is aware of results and does not have any symptoms of UTI  She says she feels fine, no burning, no urgency

## 2018-10-15 NOTE — TELEPHONE ENCOUNTER
----- Message from General Jaime DO sent at 10/15/2018  9:50 AM EDT -----  Urine protein:Cr 0 9 but appears to have urinalysis suggestive of possible UTI  Please call patient to ensure she does not have symptoms of UTI: burning/pain/increased frequency or urgency or fever  If she does, would want urine culture  Her urinalysis was from 10/11/18

## 2018-12-03 ENCOUNTER — HOSPITAL ENCOUNTER (OUTPATIENT)
Dept: ULTRASOUND IMAGING | Facility: HOSPITAL | Age: 83
Discharge: HOME | End: 2018-12-03
Attending: INTERNAL MEDICINE
Payer: MEDICARE

## 2018-12-03 DIAGNOSIS — N18.4 CKD (CHRONIC KIDNEY DISEASE), STAGE IV (HCC): ICD-10-CM

## 2018-12-03 DIAGNOSIS — N28.1 RENAL CYST: ICD-10-CM

## 2018-12-03 PROCEDURE — 76770 US EXAM ABDO BACK WALL COMP: CPT

## 2019-02-05 ENCOUNTER — TELEPHONE (OUTPATIENT)
Dept: NEPHROLOGY | Facility: CLINIC | Age: 84
End: 2019-02-05

## 2019-02-06 ENCOUNTER — OFFICE VISIT (OUTPATIENT)
Dept: NEPHROLOGY | Facility: CLINIC | Age: 84
End: 2019-02-06
Payer: MEDICARE

## 2019-02-06 VITALS — DIASTOLIC BLOOD PRESSURE: 54 MMHG | HEART RATE: 56 BPM | SYSTOLIC BLOOD PRESSURE: 110 MMHG

## 2019-02-06 DIAGNOSIS — N28.1 RENAL CYST: ICD-10-CM

## 2019-02-06 DIAGNOSIS — R60.9 EDEMA, UNSPECIFIED TYPE: ICD-10-CM

## 2019-02-06 DIAGNOSIS — E55.9 VITAMIN D DEFICIENCY: ICD-10-CM

## 2019-02-06 DIAGNOSIS — D63.8 ANEMIA OF CHRONIC DISEASE: ICD-10-CM

## 2019-02-06 DIAGNOSIS — N18.4 CKD (CHRONIC KIDNEY DISEASE), STAGE IV (HCC): Primary | ICD-10-CM

## 2019-02-06 DIAGNOSIS — I10 ESSENTIAL HYPERTENSION: ICD-10-CM

## 2019-02-06 PROCEDURE — 99214 OFFICE O/P EST MOD 30 MIN: CPT | Performed by: INTERNAL MEDICINE

## 2019-02-06 RX ORDER — FERROUS SULFATE 325(65) MG
325 TABLET ORAL 2 TIMES DAILY WITH MEALS
Start: 2019-02-06

## 2019-02-06 NOTE — LETTER
February 6, 2019     Terra Mehta, [address] [facility],[address] [place] [address]    Patient: Leonela Hernández [id number]   YOB: 1932   Date of Visit: 2/6/2019       Dear Dr Dunia Hayden:    Thank you for referring Shannon Dejesus to me for evaluation  Below are my notes for this consultation  If you have questions, please do not hesitate to call me  I look forward to following your patient along with you  Sincerely,        Any Pang DO        CC: No Recipients  Any Pang DO  2/6/2019 11:17 AM  Sign at close encounter  [address] [place] 80 y o  female MRN: [id number]  DATE: 2/6/2019  Reason for visit:   Chief Complaint   Patient presents with    Chronic Kidney Disease    Follow-up        Patient Instructions   1  CKD IV likely cardiorenal +/- from hypertensive nephrosclerosis +/- physiology of aging: baseline creatinine 1 6-1 9  sCr 1 59 as of 2/5/19 with calcium 9, K 3 5  -F/u repeat BMP, mag, phos, PTH   -She is on ARB and torsemide as listed below  Off HCTZ  -SPEP/UPEP negative for myeloma  -avoid nephrotoxins/NSAIDs(ibuprofen, aleve, motrin, advil)/fleet enemas  -would recommend avoidance of diclofenac but if this is the only medication that gives relief to hand pain, may continue sparingly   -will monitor proteinuria - repeat UpCr   -urinalysis with microscopy shows 2+ blood, 1+ protein, 3+ leukocyte esterase, innumerable WBCs, 4+ bacteria     2  Hypertension: BP well controlled on torsemide 20mg every day, losartan 25mg daily, metoprolol 75mg daily       3  Diastolic CHF(grade 3) and moderate AS(per echo performed 2/2017): on torsemide as above, monitor daily weights  Recommend ongoing follow up with cardiology  Should check daily weights x 2 weeks  Records should be sent to nephrology and cardiology offices      4  Complex renal cyst: resolved on repeat renal ultrasound  F/u renal ultrasound December 2019      5   Anemia: Hgb 9 3 as of 2/5/19, continue to monitor CBC, on oral iron  Increase to twice daily  Repeat iron panel, ferritin     6  Mineral Bone Disease of CKD: Check phos/PTH levels  On oral vit D3 supplementation    RTC in 6 months  Marco Montoya was seen today for chronic kidney disease and follow-up  Diagnoses and all orders for this visit:    CKD (chronic kidney disease), stage IV ([facility] [address] )  -     Basic metabolic panel; Future  -     Magnesium; Future  -     Phosphorus; Future  -     PTH, intact; Future  -     Protein / creatinine ratio, urine; Future  -     Urinalysis with microscopic; Future    Essential hypertension    Renal cyst    Anemia of chronic disease  -     ferrous sulfate 325 (65 Fe) mg tablet; Take 1 tablet (325 mg total) by mouth 2 (two) times a day with meals  -     CBC and differential; Future  -     Iron Panel; Future  -     Ferritin; Future    Edema, unspecified type    Vitamin D deficiency        Assessment/Plan:  1  CKD IV likely cardiorenal +/- from hypertensive nephrosclerosis +/- physiology of aging: baseline creatinine 1 6-1 9  sCr 1 59 as of 2/5/19 with calcium 9, K 3 5  -F/u repeat BMP, mag, phos, PTH   -She is on ARB and torsemide as listed below  Off HCTZ  -SPEP/UPEP negative for myeloma  -avoid nephrotoxins/NSAIDs(ibuprofen, aleve, motrin, advil)/fleet enemas  -would recommend avoidance of diclofenac but if this is the only medication that gives relief to hand pain, may continue sparingly   -will monitor proteinuria - repeat UpCr   -urinalysis with microscopy shows 2+ blood, 1+ protein, 3+ leukocyte esterase, innumerable WBCs, 4+ bacteria     2  Hypertension: BP low normal on torsemide 20mg every day, losartan 25mg daily, metoprolol 75mg daily       3  Diastolic CHF(grade 3) and moderate AS(per echo performed 2/2017): on torsemide as above, monitor daily weights  Recommend ongoing follow up with cardiology  Should check daily weights x 2 weeks   Records should be sent to nephrology and cardiology offices      4  Complex renal cyst: resolved on repeat renal ultrasound  F/u renal ultrasound December 2019      5  Anemia: Hgb 9 3 as of 2/5/19, continue to monitor CBC, on oral iron  Increase to twice daily  Repeat iron panel, ferritin     6  Mineral Bone Disease of CKD: Check phos/PTH levels  On oral vit D3 supplementation      SUBJECTIVE / INTERVAL HISTORY:  80 y o  female presents in follow up of CKD4  Veda Zheng denies any recent illness/hospitalizations/medication changes since last office visit  Denies NSAID use  Weights per outpatient record review not regularly checked  Weight has been stable at about 210lbs  Does have LE edema  Has lost 23 lbs since August 2017  Review of Systems   Constitutional: Negative for chills and fever  HENT: Negative for sore throat and trouble swallowing  Eyes: Negative for visual disturbance  Respiratory: Negative for cough and shortness of breath  Cardiovascular: Positive for leg swelling  Negative for chest pain  Gastrointestinal: Negative for abdominal pain, constipation, diarrhea, nausea and vomiting  Endocrine: Negative for polyuria  Genitourinary: Negative for difficulty urinating, dysuria and hematuria  Musculoskeletal: Positive for back pain  Negative for neck pain  Skin: Negative for rash  Neurological: Negative for dizziness and light-headedness  Psychiatric/Behavioral: The patient is not nervous/anxious  OBJECTIVE:  /54 (BP Location: Left arm, Patient Position: Sitting, Cuff Size: Large)   Pulse 56  There is no height or weight on file to calculate BMI  Physical exam:  Physical Exam   Constitutional: She appears well-developed and well-nourished  No distress  In wheelchair, obese   HENT:   Head: Normocephalic and atraumatic  Mouth/Throat: No oropharyngeal exudate  Eyes: Right eye exhibits no discharge  Left eye exhibits no discharge  No scleral icterus  Neck: Normal range of motion   Neck supple  No thyromegaly present  Cardiovascular: Normal rate and regular rhythm  Murmur heard  Pulmonary/Chest: Effort normal and breath sounds normal  No respiratory distress  She has no wheezes  Abdominal: Soft  Bowel sounds are normal  She exhibits no distension  Musculoskeletal: She exhibits edema (b/l LE)  Neurological: She is alert  She exhibits normal muscle tone  awake   Skin: Skin is warm and dry  No rash noted  She is not diaphoretic  Psychiatric: She has a normal mood and affect  Her behavior is normal    Nursing note and vitals reviewed  Medications:    Current Outpatient Prescriptions:     acetaminophen (TYLENOL) 325 mg tablet, Take 2 tablets by mouth every 6 (six) hours as needed for fever (Patient taking differently: Take 1,000 mg by mouth daily  ), Disp: 30 tablet, Rfl: 0    ascorbic acid (VITAMIN C) 500 MG tablet, Take 500 mg by mouth daily, Disp: , Rfl:     brimonidine (ALPHAGAN P) 0 1 %, Administer 1 drop to both eyes 2 (two) times a day, Disp: , Rfl:     busPIRone (BUSPAR) 15 mg tablet, Take 15 mg by mouth 3 (three) times a day, Disp: , Rfl:     Cholecalciferol (VITAMIN D3) 2000 UNITS TABS, Take 1 tablet by mouth daily  , Disp: , Rfl:     docusate sodium (COLACE) 100 mg capsule, Take 1 capsule by mouth 2 (two) times a day, Disp: 10 capsule, Rfl: 0    ferrous sulfate 325 (65 Fe) mg tablet, Take 1 tablet (325 mg total) by mouth 2 (two) times a day with meals, Disp: , Rfl:     levothyroxine 50 mcg tablet, Take 50 mcg by mouth daily  , Disp: , Rfl:     lidocaine (LIDODERM) 5 %, Apply 1 patch topically daily Remove & Discard patch within 12 hours or as directed by MD, Disp: , Rfl:     loratadine (CLARITIN) 10 mg tablet, Take 10 mg by mouth daily, Disp: , Rfl:     losartan (COZAAR) 25 mg tablet, Take 1 tablet by mouth daily, Disp: , Rfl: 0    methocarbamol (ROBAXIN) 500 mg tablet, Take 500 mg by mouth 4 (four) times a day, Disp: , Rfl:     metoprolol succinate (TOPROL-XL) 50 mg 24 hr tablet, Take 75 mg by mouth daily  Hold for sbp< 100 or hr <55, Disp: , Rfl:     omeprazole (PriLOSEC) 20 mg delayed release capsule, Take 20 mg by mouth daily  , Disp: , Rfl:     OxyCODONE HCl 5 MG TABA, Take 2 5 mg by mouth every 6 (six) hours as needed, Disp: , Rfl:     QUEtiapine (SEROquel) 25 mg tablet, Take 3 tablets by mouth daily at bedtime (Patient taking differently: Take 50 mg by mouth daily at bedtime  ), Disp: , Rfl: 0    senna (SENOKOT) 8 6 mg, Take 2 tablets by mouth daily at bedtime (Patient taking differently: Take 1 tablet by mouth daily at bedtime  ), Disp: 120 each, Rfl: 0    sertraline (ZOLOFT) 100 mg tablet, Take 150 mg by mouth daily  , Disp: , Rfl:     tafluprost (ZIOPTAN) 0 0015 % ophthalmic solution, Administer 1 drop to both eyes daily, Disp: , Rfl:     torsemide (DEMADEX) 20 mg tablet, Take 1 tablet by mouth daily, Disp: , Rfl: 0    Allergies: Allergies as of 02/06/2019 - Reviewed 02/06/2019   Allergen Reaction Noted    Hydrocodone  06/27/2017    Penicillins  02/14/2016    Sulfa antibiotics  02/14/2016    Vicodin [hydrocodone-acetaminophen]  02/14/2016       The following portions of the patient's history were reviewed and updated as appropriate: past family history, past surgical history and problem list     Laboratory Results:  Lab Results   Component Value Date    GLUCOSE 100 02/09/2017    CALCIUM 8 9 02/09/2018    K 3 7 02/09/2018    CO2 26 02/09/2018     02/09/2018    BUN 37 (H) 02/09/2018    CREATININE 1 95 (H) 02/09/2018        Lab Results   Component Value Date     2 (H) 06/30/2017    CALCIUM 8 9 02/09/2018    PHOS 3 4 08/16/2017       Portions of the record may have been created with voice recognition software   Occasional wrong word or "sound a like" substitutions may have occurred due to the inherent limitations of voice recognition software   Read the chart carefully and recognize, using context, where substitutions have occurred

## 2019-02-06 NOTE — PROGRESS NOTES
NEPHROLOGY OUTPATIENT PROGRESS NOTE   Meg Mann 80 y o  female MRN: [id number]  DATE: 2/6/2019  Reason for visit:   Chief Complaint   Patient presents with    Chronic Kidney Disease    Follow-up        Patient Instructions   1  CKD IV likely cardiorenal +/- from hypertensive nephrosclerosis +/- physiology of aging: baseline creatinine 1 6-1 9  sCr 1 59 as of 2/5/19 with calcium 9, K 3 5  -F/u repeat BMP, mag, phos, PTH   -She is on ARB and torsemide as listed below  Off HCTZ  -SPEP/UPEP negative for myeloma  -avoid nephrotoxins/NSAIDs(ibuprofen, aleve, motrin, advil)/fleet enemas  -would recommend avoidance of diclofenac but if this is the only medication that gives relief to hand pain, may continue sparingly   -will monitor proteinuria - repeat UpCr   -urinalysis with microscopy shows 2+ blood, 1+ protein, 3+ leukocyte esterase, innumerable WBCs, 4+ bacteria     2  Hypertension: BP well controlled on torsemide 20mg every day, losartan 25mg daily, metoprolol 75mg daily       3  Diastolic CHF(grade 3) and moderate AS(per echo performed 2/2017): on torsemide as above, monitor daily weights  Recommend ongoing follow up with cardiology  Should check daily weights x 2 weeks  Records should be sent to nephrology and cardiology offices      4  Complex renal cyst: resolved on repeat renal ultrasound  F/u renal ultrasound December 2019      5  Anemia: Hgb 9 3 as of 2/5/19, continue to monitor CBC, on oral iron  Increase to twice daily  Repeat iron panel, ferritin     6  Mineral Bone Disease of CKD: Check phos/PTH levels  On oral vit D3 supplementation    RTC in 6 months  Naeemlucero Doylerufina was seen today for chronic kidney disease and follow-up  Diagnoses and all orders for this visit:    CKD (chronic kidney disease), stage IV ([facility] [address] )  -     Basic metabolic panel; Future  -     Magnesium; Future  -     Phosphorus; Future  -     PTH, intact; Future  -     Protein / creatinine ratio, urine;  Future  -     Urinalysis with microscopic; Future    Essential hypertension    Renal cyst    Anemia of chronic disease  -     ferrous sulfate 325 (65 Fe) mg tablet; Take 1 tablet (325 mg total) by mouth 2 (two) times a day with meals  -     CBC and differential; Future  -     Iron Panel; Future  -     Ferritin; Future    Edema, unspecified type    Vitamin D deficiency        Assessment/Plan:  1  CKD IV likely cardiorenal +/- from hypertensive nephrosclerosis +/- physiology of aging: baseline creatinine 1 6-1 9  sCr 1 59 as of 2/5/19 with calcium 9, K 3 5  -F/u repeat BMP, mag, phos, PTH   -She is on ARB and torsemide as listed below  Off HCTZ  -SPEP/UPEP negative for myeloma  -avoid nephrotoxins/NSAIDs(ibuprofen, aleve, motrin, advil)/fleet enemas  -would recommend avoidance of diclofenac but if this is the only medication that gives relief to hand pain, may continue sparingly   -will monitor proteinuria - repeat UpCr   -urinalysis with microscopy shows 2+ blood, 1+ protein, 3+ leukocyte esterase, innumerable WBCs, 4+ bacteria     2  Hypertension: BP low normal on torsemide 20mg every day, losartan 25mg daily, metoprolol 75mg daily       3  Diastolic CHF(grade 3) and moderate AS(per echo performed 2/2017): on torsemide as above, monitor daily weights  Recommend ongoing follow up with cardiology  Should check daily weights x 2 weeks  Records should be sent to nephrology and cardiology offices      4  Complex renal cyst: resolved on repeat renal ultrasound  F/u renal ultrasound December 2019      5  Anemia: Hgb 9 3 as of 2/5/19, continue to monitor CBC, on oral iron  Increase to twice daily  Repeat iron panel, ferritin     6  Mineral Bone Disease of CKD: Check phos/PTH levels  On oral vit D3 supplementation      SUBJECTIVE / INTERVAL HISTORY:  80 y o  female presents in follow up of CKD4  Caitlyn Ambrosio denies any recent illness/hospitalizations/medication changes since last office visit  Denies NSAID use    Weights per outpatient record review not regularly checked  Weight has been stable at about 210lbs  Does have LE edema  Has lost 23 lbs since August 2017  Review of Systems   Constitutional: Negative for chills and fever  HENT: Negative for sore throat and trouble swallowing  Eyes: Negative for visual disturbance  Respiratory: Negative for cough and shortness of breath  Cardiovascular: Positive for leg swelling  Negative for chest pain  Gastrointestinal: Negative for abdominal pain, constipation, diarrhea, nausea and vomiting  Endocrine: Negative for polyuria  Genitourinary: Negative for difficulty urinating, dysuria and hematuria  Musculoskeletal: Positive for back pain  Negative for neck pain  Skin: Negative for rash  Neurological: Negative for dizziness and light-headedness  Psychiatric/Behavioral: The patient is not nervous/anxious  OBJECTIVE:  /54 (BP Location: Left arm, Patient Position: Sitting, Cuff Size: Large)   Pulse 56  There is no height or weight on file to calculate BMI  Physical exam:  Physical Exam   Constitutional: She appears well-developed and well-nourished  No distress  In wheelchair, obese   HENT:   Head: Normocephalic and atraumatic  Mouth/Throat: No oropharyngeal exudate  Eyes: Right eye exhibits no discharge  Left eye exhibits no discharge  No scleral icterus  Neck: Normal range of motion  Neck supple  No thyromegaly present  Cardiovascular: Normal rate and regular rhythm  Murmur heard  Pulmonary/Chest: Effort normal and breath sounds normal  No respiratory distress  She has no wheezes  Abdominal: Soft  Bowel sounds are normal  She exhibits no distension  Musculoskeletal: She exhibits edema (b/l LE)  Neurological: She is alert  She exhibits normal muscle tone  awake   Skin: Skin is warm and dry  No rash noted  She is not diaphoretic  Psychiatric: She has a normal mood and affect   Her behavior is normal    Nursing note and vitals reviewed  Medications:    Current Outpatient Prescriptions:     acetaminophen (TYLENOL) 325 mg tablet, Take 2 tablets by mouth every 6 (six) hours as needed for fever (Patient taking differently: Take 1,000 mg by mouth daily  ), Disp: 30 tablet, Rfl: 0    ascorbic acid (VITAMIN C) 500 MG tablet, Take 500 mg by mouth daily, Disp: , Rfl:     brimonidine (ALPHAGAN P) 0 1 %, Administer 1 drop to both eyes 2 (two) times a day, Disp: , Rfl:     busPIRone (BUSPAR) 15 mg tablet, Take 15 mg by mouth 3 (three) times a day, Disp: , Rfl:     Cholecalciferol (VITAMIN D3) 2000 UNITS TABS, Take 1 tablet by mouth daily  , Disp: , Rfl:     docusate sodium (COLACE) 100 mg capsule, Take 1 capsule by mouth 2 (two) times a day, Disp: 10 capsule, Rfl: 0    ferrous sulfate 325 (65 Fe) mg tablet, Take 1 tablet (325 mg total) by mouth 2 (two) times a day with meals, Disp: , Rfl:     levothyroxine 50 mcg tablet, Take 50 mcg by mouth daily  , Disp: , Rfl:     lidocaine (LIDODERM) 5 %, Apply 1 patch topically daily Remove & Discard patch within 12 hours or as directed by MD, Disp: , Rfl:     loratadine (CLARITIN) 10 mg tablet, Take 10 mg by mouth daily, Disp: , Rfl:     losartan (COZAAR) 25 mg tablet, Take 1 tablet by mouth daily, Disp: , Rfl: 0    methocarbamol (ROBAXIN) 500 mg tablet, Take 500 mg by mouth 4 (four) times a day, Disp: , Rfl:     metoprolol succinate (TOPROL-XL) 50 mg 24 hr tablet, Take 75 mg by mouth daily  Hold for sbp< 100 or hr <55, Disp: , Rfl:     omeprazole (PriLOSEC) 20 mg delayed release capsule, Take 20 mg by mouth daily  , Disp: , Rfl:     OxyCODONE HCl 5 MG TABA, Take 2 5 mg by mouth every 6 (six) hours as needed, Disp: , Rfl:     QUEtiapine (SEROquel) 25 mg tablet, Take 3 tablets by mouth daily at bedtime (Patient taking differently: Take 50 mg by mouth daily at bedtime  ), Disp: , Rfl: 0    senna (SENOKOT) 8 6 mg, Take 2 tablets by mouth daily at bedtime (Patient taking differently: Take 1 tablet by mouth daily at bedtime  ), Disp: 120 each, Rfl: 0    sertraline (ZOLOFT) 100 mg tablet, Take 150 mg by mouth daily  , Disp: , Rfl:     tafluprost (ZIOPTAN) 0 0015 % ophthalmic solution, Administer 1 drop to both eyes daily, Disp: , Rfl:     torsemide (DEMADEX) 20 mg tablet, Take 1 tablet by mouth daily, Disp: , Rfl: 0    Allergies: Allergies as of 02/06/2019 - Reviewed 02/06/2019   Allergen Reaction Noted    Hydrocodone  06/27/2017    Penicillins  02/14/2016    Sulfa antibiotics  02/14/2016    Vicodin [hydrocodone-acetaminophen]  02/14/2016       The following portions of the patient's history were reviewed and updated as appropriate: past family history, past surgical history and problem list     Laboratory Results:  Lab Results   Component Value Date    GLUCOSE 100 02/09/2017    CALCIUM 8 9 02/09/2018    K 3 7 02/09/2018    CO2 26 02/09/2018     02/09/2018    BUN 37 (H) 02/09/2018    CREATININE 1 95 (H) 02/09/2018        Lab Results   Component Value Date     2 (H) 06/30/2017    CALCIUM 8 9 02/09/2018    PHOS 3 4 08/16/2017       Portions of the record may have been created with voice recognition software   Occasional wrong word or "sound a like" substitutions may have occurred due to the inherent limitations of voice recognition software   Read the chart carefully and recognize, using context, where substitutions have occurred

## 2019-02-06 NOTE — PATIENT INSTRUCTIONS
1  CKD IV likely cardiorenal +/- from hypertensive nephrosclerosis +/- physiology of aging: baseline creatinine 1 6-1 9  sCr 1 59 as of 2/5/19 with calcium 9, K 3 5  -F/u repeat BMP, mag, phos, PTH   -She is on ARB and torsemide as listed below  Off HCTZ  -SPEP/UPEP negative for myeloma  -avoid nephrotoxins/NSAIDs(ibuprofen, aleve, motrin, advil)/fleet enemas  -would recommend avoidance of diclofenac but if this is the only medication that gives relief to hand pain, may continue sparingly   -will monitor proteinuria - repeat UpCr   -urinalysis with microscopy shows 2+ blood, 1+ protein, 3+ leukocyte esterase, innumerable WBCs, 4+ bacteria     2  Hypertension: BP well controlled on torsemide 20mg every day, losartan 25mg daily, metoprolol 75mg daily       3  Diastolic CHF(grade 3) and moderate AS(per echo performed 2/2017): on torsemide as above, monitor daily weights  Recommend ongoing follow up with cardiology  Should check daily weights x 2 weeks  Records should be sent to nephrology and cardiology offices      4  Complex renal cyst: resolved on repeat renal ultrasound  F/u renal ultrasound December 2019      5  Anemia: Hgb 9 3 as of 2/5/19, continue to monitor CBC, on oral iron  Increase to twice daily  Repeat iron panel, ferritin     6  Mineral Bone Disease of CKD: Check phos/PTH levels  On oral vit D3 supplementation    RTC in 6 months

## 2019-04-02 ENCOUNTER — HOSPITAL ENCOUNTER (OUTPATIENT)
Dept: NON INVASIVE DIAGNOSTICS | Facility: CLINIC | Age: 84
Discharge: HOME | End: 2019-04-02
Payer: MEDICARE

## 2019-04-02 DIAGNOSIS — I50.32 CHRONIC DIASTOLIC CHF (CONGESTIVE HEART FAILURE) (HCC): ICD-10-CM

## 2019-04-02 DIAGNOSIS — I35.0 NONRHEUMATIC AORTIC VALVE STENOSIS: ICD-10-CM

## 2019-04-02 PROCEDURE — 93306 TTE W/DOPPLER COMPLETE: CPT

## 2019-04-02 PROCEDURE — 93306 TTE W/DOPPLER COMPLETE: CPT | Performed by: INTERNAL MEDICINE

## 2019-04-11 ENCOUNTER — OFFICE VISIT (OUTPATIENT)
Dept: CARDIOLOGY CLINIC | Facility: CLINIC | Age: 84
End: 2019-04-11
Payer: MEDICARE

## 2019-04-11 VITALS
HEART RATE: 68 BPM | OXYGEN SATURATION: 96 % | DIASTOLIC BLOOD PRESSURE: 74 MMHG | BODY MASS INDEX: 37.42 KG/M2 | SYSTOLIC BLOOD PRESSURE: 128 MMHG | HEIGHT: 63 IN

## 2019-04-11 DIAGNOSIS — I44.7 LBBB (LEFT BUNDLE BRANCH BLOCK): ICD-10-CM

## 2019-04-11 DIAGNOSIS — I35.0 NONRHEUMATIC AORTIC VALVE STENOSIS: ICD-10-CM

## 2019-04-11 DIAGNOSIS — I50.32 CHRONIC DIASTOLIC CONGESTIVE HEART FAILURE (HCC): Primary | ICD-10-CM

## 2019-04-11 DIAGNOSIS — I10 ESSENTIAL HYPERTENSION: ICD-10-CM

## 2019-04-11 PROCEDURE — 99214 OFFICE O/P EST MOD 30 MIN: CPT | Performed by: INTERNAL MEDICINE

## 2019-04-11 PROCEDURE — 93000 ELECTROCARDIOGRAM COMPLETE: CPT | Performed by: INTERNAL MEDICINE

## 2019-04-11 RX ORDER — PREDNISONE 20 MG/1
20 TABLET ORAL DAILY
COMMUNITY
End: 2020-05-12 | Stop reason: ALTCHOICE

## 2019-04-11 RX ORDER — POLYETHYLENE GLYCOL 3350 17 G/17G
17 POWDER, FOR SOLUTION ORAL DAILY PRN
COMMUNITY
End: 2020-05-12 | Stop reason: ALTCHOICE

## 2019-04-11 RX ORDER — LANOLIN ALCOHOL/MO/W.PET/CERES
CREAM (GRAM) TOPICAL AS NEEDED
COMMUNITY

## 2019-04-11 RX ORDER — CARBOXYMETHYLCELLULOSE SODIUM 5 MG/ML
1 SOLUTION/ DROPS OPHTHALMIC 4 TIMES DAILY PRN
COMMUNITY

## 2019-04-11 RX ORDER — IPRATROPIUM BROMIDE AND ALBUTEROL SULFATE 2.5; .5 MG/3ML; MG/3ML
3 SOLUTION RESPIRATORY (INHALATION) 4 TIMES DAILY
COMMUNITY
End: 2020-05-12 | Stop reason: ALTCHOICE

## 2019-04-11 RX ORDER — GUAIFENESIN 600 MG
1200 TABLET, EXTENDED RELEASE 12 HR ORAL EVERY 12 HOURS SCHEDULED
COMMUNITY
End: 2020-05-12 | Stop reason: ALTCHOICE

## 2019-06-18 ENCOUNTER — TELEPHONE (OUTPATIENT)
Dept: NEPHROLOGY | Facility: CLINIC | Age: 84
End: 2019-06-18

## 2019-08-08 LAB
EXT BILIRUBIN, UA: NORMAL
EXT BLOOD, UA: NORMAL
EXT COLOR, UA: YELLOW
EXT GLUCOSE BLD: 89
EXT GLUCOSE BLD: 93
EXT GLUCOSE, UA: NORMAL
EXT KETONES: NORMAL
EXT NITRITE, UA: NORMAL
EXT PH, UA: 6
EXT PROTEIN, UA: NORMAL
EXT SPECIFIC GRAVITY, UA: 1.01
EXT UROBILINOGEN: NORMAL
EXTERNAL ANION GAP: 15
EXTERNAL BACTERIA (UA): NORMAL
EXTERNAL BICARBONATE: 24
EXTERNAL BUN: 31
EXTERNAL BUN: 36
EXTERNAL CALCIUM: 8.8
EXTERNAL CALCIUM: 9
EXTERNAL CASTS (UA): NORMAL
EXTERNAL CHLORIDE: 107
EXTERNAL CHLORIDE: 108
EXTERNAL CO2: 26
EXTERNAL CREATININE: 1.28
EXTERNAL CREATININE: 1.49
EXTERNAL EGFR: 31
EXTERNAL EGFR: 38
EXTERNAL FERRITIN: 291
EXTERNAL IRON: 36
EXTERNAL MAGNESIUM: 1.5
EXTERNAL MAGNESIUM: 1.6
EXTERNAL POTASSIUM: 3.2
EXTERNAL POTASSIUM: 4
EXTERNAL PTH: 54
EXTERNAL RBC (UA): NORMAL
EXTERNAL SODIUM: 143
EXTERNAL SODIUM: 143
EXTERNAL TIBC: 200
EXTERNAL WBC (UA): NORMAL
TSH SERPL DL<=0.05 MIU/L-ACNC: 2.35 UIU/ML (ref 0.34–4.82)
WBC # BLD EST: NORMAL 10*3/UL

## 2019-08-12 ENCOUNTER — TELEPHONE (OUTPATIENT)
Dept: NEPHROLOGY | Facility: CLINIC | Age: 84
End: 2019-08-12

## 2019-08-12 NOTE — TELEPHONE ENCOUNTER
Tried multiple times to reach out to the patient to confirm her appointment for 8/13 it would ring and ring then go busy

## 2019-08-13 ENCOUNTER — OFFICE VISIT (OUTPATIENT)
Dept: NEPHROLOGY | Facility: CLINIC | Age: 84
End: 2019-08-13
Payer: MEDICARE

## 2019-08-13 VITALS — HEART RATE: 70 BPM | DIASTOLIC BLOOD PRESSURE: 70 MMHG | SYSTOLIC BLOOD PRESSURE: 142 MMHG

## 2019-08-13 DIAGNOSIS — R60.9 EDEMA, UNSPECIFIED TYPE: ICD-10-CM

## 2019-08-13 DIAGNOSIS — E55.9 VITAMIN D DEFICIENCY: ICD-10-CM

## 2019-08-13 DIAGNOSIS — I10 ESSENTIAL HYPERTENSION: ICD-10-CM

## 2019-08-13 DIAGNOSIS — N18.4 CKD (CHRONIC KIDNEY DISEASE), STAGE IV (HCC): Primary | ICD-10-CM

## 2019-08-13 DIAGNOSIS — D63.8 ANEMIA OF CHRONIC DISEASE: ICD-10-CM

## 2019-08-13 DIAGNOSIS — N28.1 RENAL CYST: ICD-10-CM

## 2019-08-13 PROCEDURE — 99213 OFFICE O/P EST LOW 20 MIN: CPT | Performed by: INTERNAL MEDICINE

## 2019-08-13 NOTE — PATIENT INSTRUCTIONS
1  CKD IV likely cardiorenal +/- from hypertensive nephrosclerosis +/- physiology of aging: baseline creatinine 1 6-1 9  sCr 1 28 as of 5/6/19 with calcium 9, K 4  -She is on ARB and torsemide as listed below  Off HCTZ  -SPEP/UPEP negative for myeloma  -avoid nephrotoxins/NSAIDs(ibuprofen, aleve, motrin, advil)/fleet enemas  -would recommend avoidance of diclofenac but if this is the only medication that gives relief to hand pain, may continue sparingly  -urinalysis with microscopy shows 3+ leukocytes, 6-15 RBCs, innumerable WBCs, 3+ bacteria, 6-10 hyaline casts  -repeat BMP now      2  Hypertension: BP well controlled on torsemide 20mg every day, losartan 25mg daily, metoprolol 25mg daily       3  Diastolic CHF(grade 3) and moderate AS(per echo performed 2/2017): on torsemide as above, monitor daily weights  Recommend ongoing follow up with cardiology  Should check daily weights x 2 weeks  Records should be sent to nephrology and cardiology offices      4  Complex renal cyst: resolved on repeat renal ultrasound  F/u renal ultrasound December 2019      5  Anemia: Hgb 9 3 as of 2/5/19, continue to monitor CBC, on oral iron twice daily      6  Mineral Bone Disease of CKD: Check phos/PTH levels  On oral vit D3 supplementation     RTC in 6 months

## 2019-08-13 NOTE — PROGRESS NOTES
NEPHROLOGY OUTPATIENT PROGRESS NOTE   Jarrett Pettit 80 y o  female MRN: [id number]  DATE: 8/13/2019  Reason for visit:   Chief Complaint   Patient presents with    Follow-up    Chronic Kidney Disease        Patient Instructions   1  CKD IV likely cardiorenal +/- from hypertensive nephrosclerosis +/- physiology of aging: baseline creatinine 1 6-1 9  sCr 1 28 as of 5/6/19 with calcium 9, K 4  -She is on ARB and torsemide as listed below  Off HCTZ  -SPEP/UPEP negative for myeloma  -avoid nephrotoxins/NSAIDs(ibuprofen, aleve, motrin, advil)/fleet enemas  -would recommend avoidance of diclofenac but if this is the only medication that gives relief to hand pain, may continue sparingly  -urinalysis with microscopy shows 3+ leukocytes, 6-15 RBCs, innumerable WBCs, 3+ bacteria, 6-10 hyaline casts  -repeat BMP now      2  Hypertension: BP well controlled on torsemide 20mg every day, losartan 25mg daily, metoprolol 25mg daily       3  Diastolic CHF(grade 3) and moderate AS(per echo performed 2/2017): on torsemide as above, monitor daily weights  Recommend ongoing follow up with cardiology  Should check daily weights x 2 weeks  Records should be sent to nephrology and cardiology offices      4  Complex renal cyst: resolved on repeat renal ultrasound  F/u renal ultrasound December 2019      5  Anemia: Hgb 9 3 as of 2/5/19, continue to monitor CBC, on oral iron twice daily      6  Mineral Bone Disease of CKD: Check phos/PTH levels  On oral vit D3 supplementation     RTC in 6 months  Ban Law was seen today for follow-up and chronic kidney disease  Diagnoses and all orders for this visit:    CKD (chronic kidney disease), stage IV ([address] )  -     US retroperitoneal complete; Future  -     Basic metabolic panel; Future  -     Phosphorus; Future  -     PTH, intact; Future  -     Basic metabolic panel; Future    Essential hypertension    Renal cyst  -     US retroperitoneal complete;  Future    Anemia of chronic disease    Edema, unspecified type    Vitamin D deficiency        Assessment/Plan:  1  CKD IV likely cardiorenal +/- from hypertensive nephrosclerosis +/- physiology of aging: baseline creatinine 1 6-1 9  sCr 1 28 as of 5/6/19 with calcium 9, K 4  -She is on ARB and torsemide as listed below  Off HCTZ  -SPEP/UPEP negative for myeloma  -avoid nephrotoxins/NSAIDs(ibuprofen, aleve, motrin, advil)/fleet enemas  -would recommend avoidance of diclofenac but if this is the only medication that gives relief to hand pain, may continue sparingly  -urinalysis with microscopy shows 3+ leukocytes, 6-15 RBCs, innumerable WBCs, 3+ bacteria, 6-10 hyaline casts  -repeat BMP now      2  Hypertension: BP well controlled on torsemide 20mg every day, losartan 25mg daily, metoprolol 25mg daily       3  Diastolic CHF(grade 3) and moderate AS(per echo performed 2/2017): on torsemide as above, monitor daily weights  Recommend ongoing follow up with cardiology  Should check daily weights x 2 weeks  Records should be sent to nephrology and cardiology offices      4  Complex renal cyst: resolved on repeat renal ultrasound  F/u renal ultrasound December 2019      5  Anemia: Hgb 9 3 as of 2/5/19, continue to monitor CBC, on oral iron twice daily      6  Mineral Bone Disease of CKD: Check phos/PTH levels  On oral vit D3 supplementation     RTC in 6 months  SUBJECTIVE / INTERVAL HISTORY:  80 y o  female presents in follow up of CKD  Joy Marshall denies any recent illness/hospitalizations/medication changes since last office visit  Denies NSAID use  She feels good  She moved into a private room  Review of Systems   Constitutional: Negative for chills and fever  HENT: Negative for sore throat and trouble swallowing  Eyes: Negative for visual disturbance  Respiratory: Negative for cough and shortness of breath  Cardiovascular: Negative for chest pain and leg swelling     Gastrointestinal: Positive for diarrhea (when nervous)  Negative for abdominal pain, constipation, nausea and vomiting  Endocrine: Negative for polyuria  Genitourinary: Negative for difficulty urinating, dysuria and hematuria  Musculoskeletal: Negative for back pain and neck pain  Skin: Negative for rash  Neurological: Negative for dizziness, light-headedness and numbness  Psychiatric/Behavioral: The patient is not nervous/anxious  OBJECTIVE:  /70 (BP Location: Left arm, Patient Position: Sitting, Cuff Size: Large)   Pulse 70  There is no height or weight on file to calculate BMI  Physical exam:  Physical Exam   Constitutional: She appears well-developed and well-nourished  No distress  In wheelchair   HENT:   Head: Normocephalic and atraumatic  Mouth/Throat: No oropharyngeal exudate  Eyes: Right eye exhibits no discharge  Left eye exhibits no discharge  No scleral icterus  Neck: Normal range of motion  Neck supple  No thyromegaly present  Cardiovascular: Normal rate and regular rhythm  Murmur heard  Pulmonary/Chest: Effort normal and breath sounds normal  No respiratory distress  She has no wheezes  Abdominal: Soft  Bowel sounds are normal  She exhibits no distension  Musculoskeletal: She exhibits edema (b/l LE, non pitting, wearing compression stockings)  Neurological: She is alert  She exhibits normal muscle tone  awake   Skin: Skin is warm and dry  No rash noted  She is not diaphoretic  Psychiatric: She has a normal mood and affect  Her behavior is normal    Nursing note and vitals reviewed        Medications:    Current Outpatient Medications:     acetaminophen (TYLENOL) 325 mg tablet, Take 2 tablets by mouth every 6 (six) hours as needed for fever (Patient taking differently: Take 1,000 mg by mouth every 12 (twelve) hours ), Disp: 30 tablet, Rfl: 0    ascorbic acid (VITAMIN C) 500 MG tablet, Take 500 mg by mouth daily, Disp: , Rfl:     brimonidine (ALPHAGAN P) 0 1 %, Administer 1 drop to both eyes 2 (two) times a day, Disp: , Rfl:     busPIRone (BUSPAR) 15 mg tablet, Take 15 mg by mouth 3 (three) times a day, Disp: , Rfl:     carboxymethylcellulose (REFRESH PLUS) 0 5 % SOLN, Administer 1 drop to both eyes 4 (four) times a day as needed for dry eyes, Disp: , Rfl:     Cholecalciferol (VITAMIN D3) 2000 UNITS TABS, Take 1 tablet by mouth daily  , Disp: , Rfl:     docusate sodium (COLACE) 100 mg capsule, Take 1 capsule by mouth 2 (two) times a day, Disp: 10 capsule, Rfl: 0    Emollient (EUCERIN) lotion, Apply topically as needed for dry skin, Disp: , Rfl:     ferrous sulfate 325 (65 Fe) mg tablet, Take 1 tablet (325 mg total) by mouth 2 (two) times a day with meals, Disp: , Rfl:     guaiFENesin (MUCINEX) 600 mg 12 hr tablet, Take 1,200 mg by mouth every 12 (twelve) hours, Disp: , Rfl:     Hypromellose (SYSTANE OVERNIGHT THERAPY) 0 3 % GEL, Apply to eye daily at bedtime, Disp: , Rfl:     ipratropium-albuterol (DUO-NEB) 0 5-2 5 mg/3 mL nebulizer solution, Take 3 mL by nebulization 4 (four) times a day, Disp: , Rfl:     levothyroxine 50 mcg tablet, Take 50 mcg by mouth daily  , Disp: , Rfl:     lidocaine (LIDODERM) 5 %, Apply 1 patch topically daily Remove & Discard patch within 12 hours or as directed by MD, Disp: , Rfl:     losartan (COZAAR) 25 mg tablet, Take 1 tablet by mouth daily, Disp: , Rfl: 0    magnesium hydroxide (MILK OF MAGNESIA) 400 mg/5 mL oral suspension, Take 30 mL by mouth daily as needed for constipation, Disp: , Rfl:     methocarbamol (ROBAXIN) 750 mg tablet, Take 750 mg by mouth daily at bedtime , Disp: , Rfl:     metoprolol succinate (TOPROL-XL) 25 mg 24 hr tablet, Take 25 mg by mouth daily Hold for sbp< 100 or hr <55 , Disp: , Rfl:     omeprazole (PriLOSEC) 20 mg delayed release capsule, Take 20 mg by mouth daily  , Disp: , Rfl:     polyethylene glycol (MIRALAX) 17 g packet, Take 17 g by mouth daily as needed, Disp: , Rfl:     sertraline (ZOLOFT) 100 mg tablet, Take 150 mg by mouth daily  , Disp: , Rfl:     tafluprost (ZIOPTAN) 0 0015 % ophthalmic solution, Administer 1 drop to both eyes daily, Disp: , Rfl:     torsemide (DEMADEX) 20 mg tablet, Take 1 tablet by mouth daily, Disp: , Rfl: 0    OxyCODONE HCl 5 MG TABA, Take 2 5 mg by mouth daily , Disp: , Rfl:     predniSONE 20 mg tablet, Take 20 mg by mouth daily, Disp: , Rfl:     senna (SENOKOT) 8 6 mg, Take 2 tablets by mouth daily at bedtime (Patient not taking: Reported on 8/13/2019), Disp: 120 each, Rfl: 0    Allergies: Allergies as of 08/13/2019 - Reviewed 08/13/2019   Allergen Reaction Noted    Hydrocodone  06/27/2017    Penicillins  02/14/2016    Sulfa antibiotics  02/14/2016    Vicodin [hydrocodone-acetaminophen]  02/14/2016       The following portions of the patient's history were reviewed and updated as appropriate: past family history, past surgical history and problem list     Laboratory Results:  Lab Results   Component Value Date    SODIUM 143 05/06/2019    K 4 0 05/06/2019     05/06/2019    CO2 26 05/06/2019    BUN 31 05/06/2019    CREATININE 1 28 05/06/2019    GLUC 89 05/06/2019    CALCIUM 9 0 05/06/2019        Lab Results   Component Value Date    PTH 54 05/02/2019    CALCIUM 9 0 05/06/2019    PHOS 3 4 08/16/2017       Portions of the record may have been created with voice recognition software   Occasional wrong word or "sound a like" substitutions may have occurred due to the inherent limitations of voice recognition software   Read the chart carefully and recognize, using context, where substitutions have occurred

## 2019-12-02 ENCOUNTER — HOSPITAL ENCOUNTER (OUTPATIENT)
Dept: ULTRASOUND IMAGING | Facility: HOSPITAL | Age: 84
Discharge: HOME | End: 2019-12-02
Attending: INTERNAL MEDICINE
Payer: MEDICARE

## 2019-12-02 DIAGNOSIS — N18.4 CKD (CHRONIC KIDNEY DISEASE), STAGE IV (HCC): ICD-10-CM

## 2019-12-02 DIAGNOSIS — N28.1 RENAL CYST: ICD-10-CM

## 2019-12-02 PROCEDURE — 76770 US EXAM ABDO BACK WALL COMP: CPT

## 2020-01-08 ENCOUNTER — TELEPHONE (OUTPATIENT)
Dept: NEPHROLOGY | Facility: CLINIC | Age: 85
End: 2020-01-08

## 2020-03-18 ENCOUNTER — TELEPHONE (OUTPATIENT)
Dept: NEPHROLOGY | Facility: CLINIC | Age: 85
End: 2020-03-18

## 2020-03-18 NOTE — TELEPHONE ENCOUNTER
Spoke with Jennifer and faxed the labs to the patients floor the number it was faxed too was [phone]    ----- Message from Marthe Leventhal, DO sent at 3/17/2020  4:54 PM EDT -----  Patient's canceled her appointment with me on March 18, 2020 due to concerns for COVID  Her appointment has been rescheduled to April 1, 2020 at 11:30 a m  Jolene Serum Please ensure that the patient has had blood work that has already been ordered in August 2019 prior to office visit  Thanks

## 2020-03-25 ENCOUNTER — TELEPHONE (OUTPATIENT)
Dept: NEPHROLOGY | Facility: CLINIC | Age: 85
End: 2020-03-25

## 2020-03-25 ENCOUNTER — DOCUMENTATION (OUTPATIENT)
Dept: NEPHROLOGY | Facility: CLINIC | Age: 85
End: 2020-03-25

## 2020-03-25 LAB
EXT GLUCOSE BLD: 89
EXTERNAL BUN: 40
EXTERNAL CALCIUM: 9.1
EXTERNAL CHLORIDE: 105
EXTERNAL CO2: 30
EXTERNAL CREATININE: 40
EXTERNAL EGFR: 32
EXTERNAL POTASSIUM: 3.8
EXTERNAL SODIUM: 144

## 2020-03-25 NOTE — TELEPHONE ENCOUNTER
BMP received on the patient today from the nursing home  Patient can not do telemedicine, she can not hear well on the telephone  We rescheduled for May 12

## 2020-03-25 NOTE — TELEPHONE ENCOUNTER
----- Message from Marcelina Herrera DO sent at 3/18/2020  3:19 PM EDT -----  Regardin20 11:30am appt  This patient was originally scheduled on  at 9:30 a m  Her appointment was canceled  She was then rescheduled to  at 11:30 a  m(her current appt time and day)  Upon review of her chart, the patient has not had any blood work since May of 2019  I recommend that we advise her to obtain blood work when it is safe to do so, then offer a tele visit on  rather than in office visit if she is able to obtain blood work prior to this  Otherwise, we should reschedule her appointment until after she has blood work performed  Thanks

## 2020-04-28 ENCOUNTER — TELEPHONE (OUTPATIENT)
Dept: NEPHROLOGY | Facility: CLINIC | Age: 85
End: 2020-04-28

## 2020-05-12 ENCOUNTER — TELEMEDICINE (OUTPATIENT)
Dept: NEPHROLOGY | Facility: CLINIC | Age: 85
End: 2020-05-12
Payer: MEDICARE

## 2020-05-12 DIAGNOSIS — I50.32 CHRONIC DIASTOLIC CONGESTIVE HEART FAILURE (HCC): ICD-10-CM

## 2020-05-12 DIAGNOSIS — R60.9 EDEMA, UNSPECIFIED TYPE: ICD-10-CM

## 2020-05-12 DIAGNOSIS — I10 ESSENTIAL HYPERTENSION: ICD-10-CM

## 2020-05-12 DIAGNOSIS — N18.4 CKD (CHRONIC KIDNEY DISEASE), STAGE IV (HCC): Primary | ICD-10-CM

## 2020-05-12 DIAGNOSIS — E55.9 VITAMIN D DEFICIENCY: ICD-10-CM

## 2020-05-12 DIAGNOSIS — N28.1 RENAL CYST: ICD-10-CM

## 2020-05-12 DIAGNOSIS — D63.8 ANEMIA OF CHRONIC DISEASE: ICD-10-CM

## 2020-05-12 PROCEDURE — 99443 PR PHYS/QHP TELEPHONE EVALUATION 21-30 MIN: CPT | Performed by: INTERNAL MEDICINE

## 2020-05-12 RX ORDER — LOSARTAN POTASSIUM 100 MG/1
100 TABLET ORAL DAILY
Start: 2020-05-12

## 2020-05-12 RX ORDER — AMLODIPINE BESYLATE 10 MG/1
10 TABLET ORAL DAILY
Start: 2020-05-12

## 2020-06-05 ENCOUNTER — TELEMEDICINE (OUTPATIENT)
Dept: CARDIOLOGY CLINIC | Facility: CLINIC | Age: 85
End: 2020-06-05
Payer: MEDICARE

## 2020-06-05 VITALS
BODY MASS INDEX: 36.85 KG/M2 | SYSTOLIC BLOOD PRESSURE: 151 MMHG | OXYGEN SATURATION: 95 % | TEMPERATURE: 99.4 F | HEART RATE: 59 BPM | WEIGHT: 208 LBS | RESPIRATION RATE: 18 BRPM | DIASTOLIC BLOOD PRESSURE: 68 MMHG

## 2020-06-05 DIAGNOSIS — I10 ESSENTIAL HYPERTENSION: ICD-10-CM

## 2020-06-05 DIAGNOSIS — N18.4 CKD (CHRONIC KIDNEY DISEASE), STAGE IV (HCC): ICD-10-CM

## 2020-06-05 DIAGNOSIS — I44.7 LBBB (LEFT BUNDLE BRANCH BLOCK): ICD-10-CM

## 2020-06-05 DIAGNOSIS — I50.32 CHRONIC DIASTOLIC CONGESTIVE HEART FAILURE (HCC): ICD-10-CM

## 2020-06-05 DIAGNOSIS — I35.0 NONRHEUMATIC AORTIC VALVE STENOSIS: Primary | ICD-10-CM

## 2020-06-05 PROCEDURE — 99214 OFFICE O/P EST MOD 30 MIN: CPT | Performed by: INTERNAL MEDICINE

## 2020-06-05 RX ORDER — CLONIDINE 0.1 MG/24H
1 PATCH, EXTENDED RELEASE TRANSDERMAL WEEKLY
COMMUNITY

## 2020-08-19 ENCOUNTER — DOCUMENTATION (OUTPATIENT)
Dept: NEPHROLOGY | Facility: CLINIC | Age: 85
End: 2020-08-19

## 2020-08-19 LAB
EXT GLUCOSE BLD: 85
EXTERNAL ANION GAP: 9
EXTERNAL BUN: 40
EXTERNAL CALCIUM: 8.8
EXTERNAL CHLORIDE: 108
EXTERNAL CO2: 25
EXTERNAL CREATININE: 1.63
EXTERNAL EGFR: 28
EXTERNAL MAGNESIUM: 1.8
EXTERNAL PHOSPHORUS: 3.7
EXTERNAL POTASSIUM: 3.4
EXTERNAL PTH: 98.4
EXTERNAL SODIUM: 142

## 2020-09-01 ENCOUNTER — DOCUMENTATION (OUTPATIENT)
Dept: NEPHROLOGY | Facility: CLINIC | Age: 85
End: 2020-09-01

## 2020-09-01 NOTE — PROGRESS NOTES
Blood pressure log from August 1st through August 30th reviewed  Most readings are within goal is systolic [phone]  In light of patient's advanced age, would avoid aggressive blood pressure control  More recently, the patient has had systolic readings above 746  Would need to verify current medication regimen  It appears Cardiology had recently prescribed clonidine patch 0 1 milligram weekly  If blood pressure remains elevated, would consider changing metoprolol to carvedilol  The patient also appears to be on torsemide

## 2020-09-09 ENCOUNTER — TELEPHONE (OUTPATIENT)
Dept: NEPHROLOGY | Facility: CLINIC | Age: 85
End: 2020-09-09

## 2020-09-09 NOTE — TELEPHONE ENCOUNTER
----- Message from Lucia Maldonado DO sent at 9/2/2020  3:48 PM EDT -----  Patient is currently on amlodipine 10mg daily, torsemide 20mg daily, losartan 100mg daily, metoprolol 25mg daily and clonidine 0 2mg/week patch  BP medications should be split up throughout the day  Recommend torsemide and losartan in the morning, metoprolol in the afternoon, and amlodipine at night  Please inform facility that if BP remains > 160/90, and heart rate > 60, may change amlodipine to nifedipine XL 30mg daily for improved BP control  Thank you!

## 2020-09-17 ENCOUNTER — OFFICE VISIT (OUTPATIENT)
Dept: NEPHROLOGY | Facility: CLINIC | Age: 85
End: 2020-09-17
Payer: MEDICARE

## 2020-09-17 VITALS
DIASTOLIC BLOOD PRESSURE: 66 MMHG | SYSTOLIC BLOOD PRESSURE: 140 MMHG | HEART RATE: 60 BPM | RESPIRATION RATE: 18 BRPM | TEMPERATURE: 96.5 F

## 2020-09-17 DIAGNOSIS — D63.8 ANEMIA OF CHRONIC DISEASE: ICD-10-CM

## 2020-09-17 DIAGNOSIS — N28.1 RENAL CYST: ICD-10-CM

## 2020-09-17 DIAGNOSIS — N18.4 CKD (CHRONIC KIDNEY DISEASE), STAGE IV (HCC): Primary | ICD-10-CM

## 2020-09-17 DIAGNOSIS — E55.9 VITAMIN D DEFICIENCY: ICD-10-CM

## 2020-09-17 DIAGNOSIS — I10 ESSENTIAL HYPERTENSION: ICD-10-CM

## 2020-09-17 DIAGNOSIS — I50.32 CHRONIC DIASTOLIC CONGESTIVE HEART FAILURE (HCC): ICD-10-CM

## 2020-09-17 DIAGNOSIS — R60.9 EDEMA, UNSPECIFIED TYPE: ICD-10-CM

## 2020-09-17 PROCEDURE — 99214 OFFICE O/P EST MOD 30 MIN: CPT | Performed by: INTERNAL MEDICINE

## 2020-09-17 NOTE — PATIENT INSTRUCTIONS
1  CKD IV likely cardiorenal +/- from hypertensive nephrosclerosis +/- physiology of aging: baseline creatinine 1 6-1 9  sCr 1 53 as of 8/24/20 with calcium 8 5, K 3 7  -She is on losartan and torsemide as listed below  Off HCTZ  -increase potassium containing foods in diet  -SPEP/UPEP negative for myeloma  -avoid nephrotoxins/NSAIDs(ibuprofen, aleve, motrin, advil)/fleet enemas  -would recommend avoidance of diclofenac   -repeat BMP in 3 months prior to next appointment     2  Hypertension: BP acceptable, on torsemide 20mg every day, losartan 100mg daily, clonidine 0 2mg weekly patch, metoprolol 25mg daily as well as amlodipine 10mg daily  BP log should be faxed to the office      3  Diastolic CHF(grade 3) and moderate AS(per echo performed 2/2017): on torsemide as above, monitor daily weights  Recommend ongoing follow up with cardiology  Should check daily weights x 2 weeks  Records should be sent to nephrology and cardiology offices      4  Complex renal cyst: resolved on repeat renal ultrasound       5  Anemia: Hgb 10 4 as of 5/1/20, continue to monitor CBC, on oral iron twice daily      6  Mineral Bone Disease of CKD: PTH 98 4 - mild elevation   Monitor this  No need for binders yet      RTC in 4 months  Obtain bloodwork prior to next appointment

## 2020-09-17 NOTE — PROGRESS NOTES
NEPHROLOGY OUTPATIENT PROGRESS NOTE   Areli Hickey 80 y o  female MRN: [id number]  DATE: 9/17/2020  Reason for visit:   Chief Complaint   Patient presents with    Chronic Kidney Disease    Follow-up        Patient Instructions   1  CKD IV likely cardiorenal +/- from hypertensive nephrosclerosis +/- physiology of aging: baseline creatinine 1 6-1 9  sCr 1 53 as of 8/24/20 with calcium 8 5, K 3 7  -She is on losartan and torsemide as listed below  Off HCTZ  -increase potassium containing foods in diet  -SPEP/UPEP negative for myeloma  -avoid nephrotoxins/NSAIDs(ibuprofen, aleve, motrin, advil)/fleet enemas  -would recommend avoidance of diclofenac   -repeat BMP in 3 months prior to next appointment     2  Hypertension: BP acceptable, on torsemide 20mg every day, losartan 100mg daily, clonidine 0 2mg weekly patch, metoprolol 25mg daily as well as amlodipine 10mg daily  BP log should be faxed to the office      3  Diastolic CHF(grade 3) and moderate AS(per echo performed 2/2017): on torsemide as above, monitor daily weights  Recommend ongoing follow up with cardiology  Should check daily weights x 2 weeks  Records should be sent to nephrology and cardiology offices      4  Complex renal cyst: resolved on repeat renal ultrasound       5  Anemia: Hgb 10 4 as of 5/1/20, continue to monitor CBC, on oral iron twice daily      6  Mineral Bone Disease of CKD: PTH 98 4 - mild elevation   Monitor this  No need for binders yet      RTC in 4 months  Obtain bloodwork prior to next appointment  Melba Ivory was seen today for chronic kidney disease and follow-up  Diagnoses and all orders for this visit:    CKD (chronic kidney disease), stage IV ([facility] [address] )  -     Urinalysis with microscopic; Future  -     Protein / creatinine ratio, urine; Future  -     Basic metabolic panel; Future  -     Magnesium; Future  -     Phosphorus; Future  -     PTH, intact;  Future  -     CBC and differential; Future    Chronic diastolic congestive heart failure (HCC)    Essential hypertension    Renal cyst    Anemia of chronic disease  -     CBC and differential; Future    Edema, unspecified type    Vitamin D deficiency        Assessment/Plan:  1  CKD IV likely cardiorenal +/- from hypertensive nephrosclerosis +/- physiology of aging: baseline creatinine 1 6-1 9  sCr 1 53 as of 8/24/20 with calcium 8 5, K 3 7  -She is on losartan and torsemide as listed below  Off HCTZ  -increase potassium containing foods in diet  -SPEP/UPEP negative for myeloma  -avoid nephrotoxins/NSAIDs(ibuprofen, aleve, motrin, advil)/fleet enemas  -would recommend avoidance of diclofenac   -repeat BMP in 3 months prior to next appointment     2  Hypertension: BP acceptable, on torsemide 20mg every day, losartan 100mg daily, clonidine 0 2mg weekly patch, metoprolol 25mg daily as well as amlodipine 10mg daily  BP log should be faxed to the office      3  Diastolic CHF(grade 3) and moderate AS(per echo performed 2/2017): on torsemide as above, monitor daily weights  Recommend ongoing follow up with cardiology  Should check daily weights x 2 weeks  Records should be sent to nephrology and cardiology offices      4  Complex renal cyst: resolved on repeat renal ultrasound       5  Anemia: Hgb 10 4 as of 5/1/20, continue to monitor CBC, on oral iron twice daily      6  Mineral Bone Disease of CKD: PTH 98 4 - mild elevation   Monitor this  No need for binders yet      RTC in 4 months  Obtain bloodwork prior to next appointment  SUBJECTIVE / INTERVAL HISTORY:  80 y o  female presents in follow up of CKD  Veda Zheng denies any recent illness/hospitalizations/medication changes since last office visit  Denies NSAID use  HTN - BP has been ok  She does not know how it has been at the facility  CHf - weight has been ok  Denies leg edema  Review of Systems   Constitutional: Negative for chills and fever  HENT: Negative for sore throat and trouble swallowing      Eyes: Negative for visual disturbance  Respiratory: Negative for cough and shortness of breath  Cardiovascular: Negative for chest pain and leg swelling  Gastrointestinal: Negative for abdominal pain, constipation, diarrhea, nausea and vomiting  Endocrine: Negative for polyuria  Genitourinary: Negative for difficulty urinating, dysuria and hematuria  Musculoskeletal: Negative for back pain and neck pain  Skin: Negative for rash  Neurological: Negative for dizziness, light-headedness and numbness  Hematological: Negative for adenopathy  Does not bruise/bleed easily  Psychiatric/Behavioral: The patient is not nervous/anxious  OBJECTIVE:  /66 (BP Location: Right arm, Patient Position: Sitting, Cuff Size: Large)   Pulse 60   Resp 18  There is no height or weight on file to calculate BMI  Physical exam:  Physical Exam  Vitals signs and nursing note reviewed  Constitutional:       General: She is not in acute distress  Appearance: Normal appearance  She is well-developed  She is not diaphoretic  Comments: In wheelchair   HENT:      Head: Normocephalic and atraumatic  Mouth/Throat:      Comments: Wearing mask  Eyes:      General: No scleral icterus  Right eye: No discharge  Left eye: No discharge  Comments: +eyeglasses   Neck:      Musculoskeletal: Normal range of motion and neck supple  Thyroid: No thyromegaly  Cardiovascular:      Rate and Rhythm: Normal rate and regular rhythm  Heart sounds: Murmur present  Pulmonary:      Effort: Pulmonary effort is normal  No respiratory distress  Breath sounds: Normal breath sounds  No wheezing  Abdominal:      General: Bowel sounds are normal  There is no distension  Palpations: Abdomen is soft  Musculoskeletal: Normal range of motion  General: Swelling (b/l LE) present  Skin:     General: Skin is warm and dry  Findings: No rash     Neurological:      General: No focal deficit present  Mental Status: She is alert  Motor: No abnormal muscle tone  Comments: awake   Psychiatric:         Mood and Affect: Mood normal          Behavior: Behavior normal          Medications:    Current Outpatient Medications:     acetaminophen (TYLENOL) 325 mg tablet, Take 2 tablets by mouth every 6 (six) hours as needed for fever (Patient taking differently: Take 1,000 mg by mouth every 12 (twelve) hours ), Disp: 30 tablet, Rfl: 0    amLODIPine (NORVASC) 10 mg tablet, Take 1 tablet (10 mg total) by mouth daily, Disp: , Rfl:     ascorbic acid (VITAMIN C) 500 MG tablet, Take 500 mg by mouth 2 (two) times a day, Disp: , Rfl:     brimonidine (ALPHAGAN P) 0 1 %, Administer 1 drop to both eyes 2 (two) times a day, Disp: , Rfl:     busPIRone (BUSPAR) 15 mg tablet, Take 15 mg by mouth 3 (three) times a day, Disp: , Rfl:     carboxymethylcellulose (REFRESH PLUS) 0 5 % SOLN, Administer 1 drop to both eyes 4 (four) times a day as needed for dry eyes, Disp: , Rfl:     cloNIDine (CATAPRES-TTS-1) 0 1 mg/24 hr, Place 1 patch on the skin once a week, Disp: , Rfl:     cloNIDine (CATAPRES-TTS-1) 0 1 mg/24 hr, Place 1 patch on the skin once a week, Disp: , Rfl:     Emollient (EUCERIN) lotion, Apply topically as needed for dry skin, Disp: , Rfl:     ferrous sulfate 325 (65 Fe) mg tablet, Take 1 tablet (325 mg total) by mouth 2 (two) times a day with meals, Disp: , Rfl:     Hypromellose (SYSTANE OVERNIGHT THERAPY) 0 3 % GEL, Apply to eye daily at bedtime, Disp: , Rfl:     levothyroxine 50 mcg tablet, Take 50 mcg by mouth daily  , Disp: , Rfl:     losartan (COZAAR) 100 MG tablet, Take 1 tablet (100 mg total) by mouth daily, Disp: , Rfl:     magnesium hydroxide (MILK OF MAGNESIA) 400 mg/5 mL oral suspension, Take 30 mL by mouth daily as needed for constipation, Disp: , Rfl:     metoprolol succinate (TOPROL-XL) 25 mg 24 hr tablet, Take 25 mg by mouth daily Hold for sbp< 100 or hr <55 , Disp: , Rfl:   omeprazole (PriLOSEC) 20 mg delayed release capsule, Take 20 mg by mouth daily  , Disp: , Rfl:     sertraline (ZOLOFT) 100 mg tablet, Take 150 mg by mouth daily  , Disp: , Rfl:     tafluprost (ZIOPTAN) 0 0015 % ophthalmic solution, Administer 1 drop to both eyes daily, Disp: , Rfl:     torsemide (DEMADEX) 20 mg tablet, Take 1 tablet by mouth daily, Disp: , Rfl: 0    Allergies: Allergies as of 09/17/2020 - Reviewed 09/17/2020   Allergen Reaction Noted    Hydrocodone  06/27/2017    Penicillins  02/14/2016    Sulfa antibiotics  02/14/2016    Vicodin [hydrocodone-acetaminophen]  02/14/2016       The following portions of the patient's history were reviewed and updated as appropriate: past family history, past surgical history and problem list     Laboratory Results:  Lab Results   Component Value Date    SODIUM 142 08/17/2020    K 3 4 08/17/2020     08/17/2020    CO2 25 08/17/2020    BUN 40 08/17/2020    CREATININE 1 63 08/17/2020    GLUC 85 08/17/2020    CALCIUM 8 8 08/17/2020        Lab Results   Component Value Date    PTH 98 4 08/17/2020    CALCIUM 8 8 08/17/2020    PHOS 3 7 08/17/2020       Portions of the record may have been created with voice recognition software   Occasional wrong word or "sound a like" substitutions may have occurred due to the inherent limitations of voice recognition software   Read the chart carefully and recognize, using context, where substitutions have occurred

## 2020-12-23 ENCOUNTER — TELEPHONE (OUTPATIENT)
Dept: NEPHROLOGY | Facility: CLINIC | Age: 85
End: 2020-12-23

## 2022-01-23 NOTE — PSYCH
Message  Message Free Text Note Form: learned via checking her chart that via SMART on 2/6/16 she cancelled her ind  counseling appointment for 2/8/16 @ 9 AM; no reason provided; Active Problems    1  Anemia of chronic disease (285 29) (D63 8)   2  Aortic stenosis (424 1) (I35 0)   3  Atopic dermatitis (691 8) (L20 9)   4  Bilateral impacted cerumen (380 4) (H61 23)   5  Chronic kidney disease (585 9) (N18 9)   6  Cough (786 2) (R05)   7  Dry eye (375 15) (H04 129)   8  Dysthymic disorder (300 4) (F34 1)   9  Esophageal reflux (530 81) (K21 9)   10  EROS (generalized anxiety disorder) (300 02) (F41 1)   11  Generalized osteoarthritis (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 00) (M15 9)   12  Generalized pain (780 96) (R52)   13  Glaucoma (365 9) (H40 9)   14  Hypertension (401 9) (I10)   15  Hypothyroidism (244 9) (E03 9)   16  Lumbar canal stenosis (724 02) (M48 06)   17  Major depressive disorder, recurrent, in full remission (296 36) (F33 42)   18  Venous insufficiency (chronic) (peripheral) (459 81) (I87 2)   19  Vitamin D deficiency (268 9) (E55 9)    Current Meds   1  Artificial Tears (Allscrips) SOLN Recorded   2  BusPIRone HCl - 15 MG Oral Tablet; TAKE 1 TABLET 3 TIMES DAILY; Therapy: 46AFC6589 to (Evaluate:03Tms0415)  Requested for: 07HDL7707; Last   Rx:08Jan2016 Ordered   3  Citalopram Hydrobromide 40 MG Oral Tablet; TAKE 1 TABLET DAILY; Therapy: 72JOK9473 to (Evaluate:36Nfw0260)  Requested for: 13JKN3175; Last   Rx:08Jan2016 Ordered   4  Levothyroxine Sodium 50 MCG Oral Tablet; Take 1 tablet by mouth once daily at 7am;   Therapy: 55VOE8577 to (Last Rx:29Oct2014)  Requested for: 29Oct2014 Ordered   5  Levoxyl 50 MCG Oral Tablet; TAKE 1 TABLET BY MOUTH ONCE DAILY AT 7AM   (HYPOTHYROIDISM); Therapy: 23WWA2573 to (Last Rx:28Oct2014)  Requested for: 28Oct2014 Ordered   6  Loperamide HCl - 2 MG Oral Capsule; TAKE 1 CAPSULE Every 6 hours PRN diarrhea; Therapy: 00QVV3089 to (Dg Hernandez)  Requested for: 89TJK8185;  Last Rx: 48QVV6032 Ordered   7  LORazepam 0 5 MG Oral Tablet (Ativan); TAKE 1 TABLET 3 TIMES DAILY; Therapy: 77GIW1159 to (Evaluate:70Mhz6013)  Requested for: 40REJ7197; Last   Rx:08Jan2016 Ordered   8  Losartan Potassium-HCTZ 100-25 MG Oral Tablet; TAKE 1 TABLET ONCE DAILY; Therapy: 66UFA9790 to (Evaluate:21Sep2015)  Requested for: 51EPG6837; Last   Rx:25Mar2015 Ordered   9  Lumigan 0 01 % Ophthalmic Solution; Therapy: 16QTL1234 to (Last Rx:25Mar2011)  Requested for: 25Mar2011 Ordered   10  Metoprolol Succinate ER 50 MG Oral Tablet Extended Release 24 Hour; TAKE 1 5    TABLET DAILY AS DIRECTED; Therapy: 62QVJ8530 to (Evaluate:04Sep2015)  Requested for: 84QXI6113; Last    Rx:02Mar2015 Ordered   11  Mucinex DM  MG Oral Tablet Extended Release 12 Hour; TAKE 1 TO 2 TABLETS    EVERY 12 HOURS AS NEEDED; Therapy: 77RNS6146 to (Evaluate:02Apr2013)  Requested for: 28Mar2013; Last    Rx:28Mar2013 Ordered   12  Nystatin 656922 UNIT/GM External Powder; APPLY 2-3 TIMES DAILY TO AFFECTED    AREA(S); Therapy: 76GTQ2937 to (Last Rx:01Jun2015)  Requested for: 01Jun2015 Ordered   13  Omeprazole 20 MG Oral Capsule Delayed Release; TAKE 1 CAPSULE DAILY EVERY    MORNING BEFORE BREAKFAST; Therapy: 56SRS5317 to (Evaluate:00Jdn5445)  Requested for: 34TMK9128; Last    Rx:25Mar2015 Ordered   14  QUEtiapine Fumarate 100 MG Oral Tablet (SEROquel); TAKE 1 TABLET AT BEDTIME; Therapy: 68MEJ8543 to (Evaluate:38Zgp9421)  Requested for: 45LHE4833; Last    Rx:08Jan2016 Ordered   15  QUEtiapine Fumarate 25 MG Oral Tablet; Take 1 tablet daily; Therapy: 72GBS7316 to (Evaluate:08Pcc6643)  Requested for: 43ZSD2632; Last    Rx:08Jan2016 Ordered   16  QUEtiapine Fumarate 50 MG Oral Tablet; TAKE 1 TABLET AT BEDTIME; Therapy: 10HFY1073 to (Evaluate:66Jxs1454)  Requested for: 16MHX4740; Last    Rx:08Jan2016 Ordered   17  SEROquel 100 MG Oral Tablet (QUEtiapine Fumarate); Therapy: (James Fan) to Recorded   18   Timolol Maleate 0 5 % Ophthalmic Gel Forming Solution; Therapy: 61CZL7450 to (Last Rx:25Mar2011)  Requested for: 25Mar2011 Ordered   19  Torsemide 20 MG Oral Tablet (Demadex); take 1 tablet every other day  Requested for:    47MFN3920; Last Rx:09Jan2015 Ordered   20  Torsemide 20 MG Oral Tablet; take 1 tablet every other day  Requested for: 17PPT1748; Last Rx:27Jan2015 Ordered   21  TraMADol HCl - 50 MG Oral Tablet; TAKE 1 TABLET 3 TIMES DAILY; Therapy: 57Tvs6122 to (Evaluate:33Xks3474)  Requested for: 15Apr2014; Last    Rx:15Apr2014 Ordered   22  Tylenol Arthritis Ext Relief 650 MG TBCR; 1 tab 3x daily Recorded   23  Vitamin D 2000 UNIT Oral Tablet; One tablet daily; Therapy: 14ZOB0311 to (Last Rx:25Mar2015)  Requested for: 25Mar2015 Ordered   24  Zioptan 0 0015 % Ophthalmic Solution; Therapy: 27BUW2255 to (Evaluate:48Btx2110) Recorded    Allergies    1  Penicillins   2  Sulfa Drugs   3   Vicodin TABS    Signatures   Electronically signed by : ROJELIO Gomez; Feb 8 2016  8:20AM EST                       (Author) Explanation of exam/test

## 2022-05-03 NOTE — DISCHARGE INSTRUCTIONS
